# Patient Record
Sex: FEMALE | Race: WHITE | NOT HISPANIC OR LATINO | Employment: OTHER | URBAN - METROPOLITAN AREA
[De-identification: names, ages, dates, MRNs, and addresses within clinical notes are randomized per-mention and may not be internally consistent; named-entity substitution may affect disease eponyms.]

---

## 2017-03-10 ENCOUNTER — OFFICE VISIT - HEALTHEAST (OUTPATIENT)
Dept: CARDIOLOGY | Facility: CLINIC | Age: 82
End: 2017-03-10

## 2017-03-10 ENCOUNTER — AMBULATORY - HEALTHEAST (OUTPATIENT)
Dept: CARDIOLOGY | Facility: CLINIC | Age: 82
End: 2017-03-10

## 2017-03-10 DIAGNOSIS — N18.9 CHRONIC KIDNEY DISEASE, UNSPECIFIED: ICD-10-CM

## 2017-03-10 DIAGNOSIS — I50.32 CHRONIC DIASTOLIC HEART FAILURE (H): ICD-10-CM

## 2017-03-10 DIAGNOSIS — I48.19 PERSISTENT ATRIAL FIBRILLATION (H): ICD-10-CM

## 2017-03-10 ASSESSMENT — MIFFLIN-ST. JEOR: SCORE: 1294.98

## 2017-03-14 ENCOUNTER — HOSPITAL ENCOUNTER (OUTPATIENT)
Dept: CARDIOLOGY | Facility: HOSPITAL | Age: 82
Discharge: HOME | End: 2017-03-14
Attending: INTERNAL MEDICINE

## 2017-03-14 DIAGNOSIS — N18.9 CHRONIC KIDNEY DISEASE, UNSPECIFIED: ICD-10-CM

## 2017-03-14 DIAGNOSIS — I48.19 PERSISTENT ATRIAL FIBRILLATION (H): ICD-10-CM

## 2017-03-14 DIAGNOSIS — I50.32 CHRONIC DIASTOLIC HEART FAILURE (H): ICD-10-CM

## 2017-04-25 ENCOUNTER — RECORDS - HEALTHEAST (OUTPATIENT)
Dept: ADMINISTRATIVE | Facility: OTHER | Age: 82
End: 2017-04-25

## 2017-05-15 ENCOUNTER — RECORDS - HEALTHEAST (OUTPATIENT)
Dept: ADMINISTRATIVE | Facility: OTHER | Age: 82
End: 2017-05-15

## 2017-05-16 ENCOUNTER — RECORDS - HEALTHEAST (OUTPATIENT)
Dept: ADMINISTRATIVE | Facility: OTHER | Age: 82
End: 2017-05-16

## 2017-06-09 ENCOUNTER — RECORDS - HEALTHEAST (OUTPATIENT)
Dept: ADMINISTRATIVE | Facility: OTHER | Age: 82
End: 2017-06-09

## 2017-06-12 ENCOUNTER — RECORDS - HEALTHEAST (OUTPATIENT)
Dept: LAB | Facility: CLINIC | Age: 82
End: 2017-06-12

## 2017-06-12 LAB
CHOLEST SERPL-MCNC: 140 MG/DL
FASTING STATUS PATIENT QL REPORTED: ABNORMAL
HDLC SERPL-MCNC: 48 MG/DL
LDLC SERPL CALC-MCNC: 47 MG/DL
TRIGL SERPL-MCNC: 224 MG/DL

## 2017-07-31 ENCOUNTER — RECORDS - HEALTHEAST (OUTPATIENT)
Dept: ADMINISTRATIVE | Facility: OTHER | Age: 82
End: 2017-07-31

## 2017-10-03 ENCOUNTER — RECORDS - HEALTHEAST (OUTPATIENT)
Dept: ADMINISTRATIVE | Facility: OTHER | Age: 82
End: 2017-10-03

## 2017-10-03 ENCOUNTER — AMBULATORY - HEALTHEAST (OUTPATIENT)
Dept: CARDIOLOGY | Facility: CLINIC | Age: 82
End: 2017-10-03

## 2017-10-05 ENCOUNTER — OFFICE VISIT - HEALTHEAST (OUTPATIENT)
Dept: CARDIOLOGY | Facility: CLINIC | Age: 82
End: 2017-10-05

## 2017-10-05 ENCOUNTER — HOSPITAL ENCOUNTER (OUTPATIENT)
Dept: RADIOLOGY | Facility: HOSPITAL | Age: 82
Discharge: HOME | End: 2017-10-05
Attending: INTERNAL MEDICINE

## 2017-10-05 DIAGNOSIS — I50.32 CHRONIC DIASTOLIC HEART FAILURE (H): ICD-10-CM

## 2017-10-05 DIAGNOSIS — I48.19 PERSISTENT ATRIAL FIBRILLATION (H): ICD-10-CM

## 2017-10-05 DIAGNOSIS — I10 BENIGN ESSENTIAL HYPERTENSION: ICD-10-CM

## 2017-10-05 ASSESSMENT — MIFFLIN-ST. JEOR: SCORE: 1394.77

## 2017-10-09 ENCOUNTER — COMMUNICATION - HEALTHEAST (OUTPATIENT)
Dept: CARDIOLOGY | Facility: CLINIC | Age: 82
End: 2017-10-09

## 2017-10-09 ENCOUNTER — AMBULATORY - HEALTHEAST (OUTPATIENT)
Dept: CARDIOLOGY | Facility: CLINIC | Age: 82
End: 2017-10-09

## 2017-10-09 DIAGNOSIS — I48.91 ATRIAL FIBRILLATION (H): ICD-10-CM

## 2017-10-10 ENCOUNTER — OFFICE VISIT - HEALTHEAST (OUTPATIENT)
Dept: RHEUMATOLOGY | Facility: CLINIC | Age: 82
End: 2017-10-10

## 2017-10-10 ENCOUNTER — COMMUNICATION - HEALTHEAST (OUTPATIENT)
Dept: CARDIOLOGY | Facility: CLINIC | Age: 82
End: 2017-10-10

## 2017-10-10 ENCOUNTER — RECORDS - HEALTHEAST (OUTPATIENT)
Dept: GENERAL RADIOLOGY | Age: 82
End: 2017-10-10

## 2017-10-10 DIAGNOSIS — K50.90 CROHN DISEASE (H): ICD-10-CM

## 2017-10-10 DIAGNOSIS — I48.91 ATRIAL FIBRILLATION (H): ICD-10-CM

## 2017-10-10 DIAGNOSIS — M25.50 POLYARTHRALGIA: ICD-10-CM

## 2017-10-10 DIAGNOSIS — M25.50 PAIN IN UNSPECIFIED JOINT: ICD-10-CM

## 2017-10-10 ASSESSMENT — MIFFLIN-ST. JEOR: SCORE: 1392.05

## 2017-10-11 LAB — HCV AB SERPL QL IA: NEGATIVE

## 2017-10-12 LAB
ANA SER QL: 1.2 U
C-ANCA - HISTORICAL: NEGATIVE
P-ANCA - HISTORICAL: POSITIVE

## 2017-11-13 ENCOUNTER — COMMUNICATION - HEALTHEAST (OUTPATIENT)
Dept: ADMINISTRATIVE | Facility: CLINIC | Age: 82
End: 2017-11-13

## 2017-12-08 ENCOUNTER — OFFICE VISIT - HEALTHEAST (OUTPATIENT)
Dept: RHEUMATOLOGY | Facility: CLINIC | Age: 82
End: 2017-12-08

## 2017-12-08 DIAGNOSIS — M15.0 PRIMARY OSTEOARTHRITIS INVOLVING MULTIPLE JOINTS: ICD-10-CM

## 2017-12-08 DIAGNOSIS — M11.20 CHONDROCALCINOSIS: ICD-10-CM

## 2017-12-08 DIAGNOSIS — K50.90 CROHN DISEASE (H): ICD-10-CM

## 2017-12-08 DIAGNOSIS — M06.4 INFLAMMATORY POLYARTHRITIS (H): ICD-10-CM

## 2017-12-08 DIAGNOSIS — M25.50 POLYARTHRALGIA: ICD-10-CM

## 2017-12-08 ASSESSMENT — MIFFLIN-ST. JEOR: SCORE: 1376.63

## 2018-03-13 ENCOUNTER — RECORDS - HEALTHEAST (OUTPATIENT)
Dept: ADMINISTRATIVE | Facility: OTHER | Age: 83
End: 2018-03-13

## 2018-03-16 ENCOUNTER — RECORDS - HEALTHEAST (OUTPATIENT)
Dept: ADMINISTRATIVE | Facility: OTHER | Age: 83
End: 2018-03-16

## 2018-03-19 ENCOUNTER — AMBULATORY - HEALTHEAST (OUTPATIENT)
Dept: VASCULAR SURGERY | Facility: CLINIC | Age: 83
End: 2018-03-19

## 2018-03-19 DIAGNOSIS — S41.112A LACERATION OF LEFT UPPER EXTREMITY, INITIAL ENCOUNTER: ICD-10-CM

## 2018-03-20 ENCOUNTER — RECORDS - HEALTHEAST (OUTPATIENT)
Dept: ADMINISTRATIVE | Facility: OTHER | Age: 83
End: 2018-03-20

## 2018-03-21 ENCOUNTER — AMBULATORY - HEALTHEAST (OUTPATIENT)
Dept: VASCULAR SURGERY | Facility: CLINIC | Age: 83
End: 2018-03-21

## 2018-03-26 ENCOUNTER — OFFICE VISIT - HEALTHEAST (OUTPATIENT)
Dept: VASCULAR SURGERY | Facility: CLINIC | Age: 83
End: 2018-03-26

## 2018-03-26 DIAGNOSIS — E11.9 DM (DIABETES MELLITUS) (H): ICD-10-CM

## 2018-03-26 DIAGNOSIS — S51.012D SKIN TEAR OF LEFT ELBOW WITHOUT COMPLICATION, SUBSEQUENT ENCOUNTER: ICD-10-CM

## 2018-03-26 DIAGNOSIS — E11.9 DIABETES MELLITUS (H): ICD-10-CM

## 2018-03-26 DIAGNOSIS — I50.32 CHRONIC DIASTOLIC HEART FAILURE (H): ICD-10-CM

## 2018-03-30 ENCOUNTER — RECORDS - HEALTHEAST (OUTPATIENT)
Dept: ADMINISTRATIVE | Facility: OTHER | Age: 83
End: 2018-03-30

## 2018-04-09 ENCOUNTER — RECORDS - HEALTHEAST (OUTPATIENT)
Dept: ADMINISTRATIVE | Facility: OTHER | Age: 83
End: 2018-04-09

## 2018-04-09 ENCOUNTER — AMBULATORY - HEALTHEAST (OUTPATIENT)
Dept: CARDIOLOGY | Facility: CLINIC | Age: 83
End: 2018-04-09

## 2018-04-10 ENCOUNTER — COMMUNICATION - HEALTHEAST (OUTPATIENT)
Dept: ADMINISTRATIVE | Facility: CLINIC | Age: 83
End: 2018-04-10

## 2018-04-11 ENCOUNTER — COMMUNICATION - HEALTHEAST (OUTPATIENT)
Dept: CARDIOLOGY | Facility: CLINIC | Age: 83
End: 2018-04-11

## 2018-04-11 ENCOUNTER — OFFICE VISIT - HEALTHEAST (OUTPATIENT)
Dept: CARDIOLOGY | Facility: CLINIC | Age: 83
End: 2018-04-11

## 2018-04-11 DIAGNOSIS — I48.19 PERSISTENT ATRIAL FIBRILLATION (H): ICD-10-CM

## 2018-04-11 DIAGNOSIS — I25.10 CORONARY ARTERY DISEASE INVOLVING NATIVE CORONARY ARTERY WITHOUT ANGINA PECTORIS: ICD-10-CM

## 2018-04-11 DIAGNOSIS — I50.32 CHRONIC DIASTOLIC HEART FAILURE (H): ICD-10-CM

## 2018-04-11 ASSESSMENT — MIFFLIN-ST. JEOR: SCORE: 1399.31

## 2018-05-29 ENCOUNTER — SURGERY - HEALTHEAST (OUTPATIENT)
Dept: GASTROENTEROLOGY | Facility: HOSPITAL | Age: 83
End: 2018-05-29

## 2018-05-29 ASSESSMENT — MIFFLIN-ST. JEOR: SCORE: 1358.48

## 2018-06-04 ENCOUNTER — RECORDS - HEALTHEAST (OUTPATIENT)
Dept: LAB | Facility: CLINIC | Age: 83
End: 2018-06-04

## 2018-06-05 LAB — BACTERIA SPEC CULT: NORMAL

## 2018-08-03 ENCOUNTER — RECORDS - HEALTHEAST (OUTPATIENT)
Dept: LAB | Facility: CLINIC | Age: 83
End: 2018-08-03

## 2018-08-03 LAB
ALBUMIN SERPL-MCNC: 2.6 G/DL (ref 3.5–5)
ALP SERPL-CCNC: 71 U/L (ref 45–120)
ALT SERPL W P-5'-P-CCNC: 16 U/L (ref 0–45)
ANION GAP SERPL CALCULATED.3IONS-SCNC: 11 MMOL/L (ref 5–18)
AST SERPL W P-5'-P-CCNC: 17 U/L (ref 0–40)
BILIRUB SERPL-MCNC: 0.2 MG/DL (ref 0–1)
BUN SERPL-MCNC: 13 MG/DL (ref 8–28)
CALCIUM SERPL-MCNC: 8.8 MG/DL (ref 8.5–10.5)
CHLORIDE BLD-SCNC: 106 MMOL/L (ref 98–107)
CO2 SERPL-SCNC: 24 MMOL/L (ref 22–31)
CREAT SERPL-MCNC: 0.92 MG/DL (ref 0.6–1.1)
GFR SERPL CREATININE-BSD FRML MDRD: 58 ML/MIN/1.73M2
GLUCOSE BLD-MCNC: 130 MG/DL (ref 70–125)
POTASSIUM BLD-SCNC: 4.7 MMOL/L (ref 3.5–5)
PROT SERPL-MCNC: 5.9 G/DL (ref 6–8)
SODIUM SERPL-SCNC: 141 MMOL/L (ref 136–145)
TSH SERPL DL<=0.005 MIU/L-ACNC: 2.56 UIU/ML (ref 0.3–5)

## 2018-08-27 ENCOUNTER — HOME CARE/HOSPICE - HEALTHEAST (OUTPATIENT)
Dept: HOME HEALTH SERVICES | Facility: HOME HEALTH | Age: 83
End: 2018-08-27

## 2018-08-30 ENCOUNTER — HOME CARE/HOSPICE - HEALTHEAST (OUTPATIENT)
Dept: HOME HEALTH SERVICES | Facility: HOME HEALTH | Age: 83
End: 2018-08-30

## 2018-09-01 ENCOUNTER — HOME CARE/HOSPICE - HEALTHEAST (OUTPATIENT)
Dept: HOME HEALTH SERVICES | Facility: HOME HEALTH | Age: 83
End: 2018-09-01

## 2018-09-02 ENCOUNTER — HOME CARE/HOSPICE - HEALTHEAST (OUTPATIENT)
Dept: HOME HEALTH SERVICES | Facility: HOME HEALTH | Age: 83
End: 2018-09-02

## 2018-09-03 ENCOUNTER — HOME CARE/HOSPICE - HEALTHEAST (OUTPATIENT)
Dept: HOME HEALTH SERVICES | Facility: HOME HEALTH | Age: 83
End: 2018-09-03

## 2018-09-05 ENCOUNTER — HOME CARE/HOSPICE - HEALTHEAST (OUTPATIENT)
Dept: HOME HEALTH SERVICES | Facility: HOME HEALTH | Age: 83
End: 2018-09-05

## 2018-09-06 ENCOUNTER — HOME CARE/HOSPICE - HEALTHEAST (OUTPATIENT)
Dept: HOME HEALTH SERVICES | Facility: HOME HEALTH | Age: 83
End: 2018-09-06

## 2018-09-10 ENCOUNTER — HOME CARE/HOSPICE - HEALTHEAST (OUTPATIENT)
Dept: HOME HEALTH SERVICES | Facility: HOME HEALTH | Age: 83
End: 2018-09-10

## 2018-09-13 ENCOUNTER — RECORDS - HEALTHEAST (OUTPATIENT)
Dept: LAB | Facility: CLINIC | Age: 83
End: 2018-09-13

## 2018-09-13 ENCOUNTER — HOME CARE/HOSPICE - HEALTHEAST (OUTPATIENT)
Dept: HOME HEALTH SERVICES | Facility: HOME HEALTH | Age: 83
End: 2018-09-13

## 2018-09-13 ENCOUNTER — COMMUNICATION - HEALTHEAST (OUTPATIENT)
Dept: PHARMACY | Facility: CLINIC | Age: 83
End: 2018-09-13

## 2018-09-13 ENCOUNTER — HOSPITAL ENCOUNTER (OUTPATIENT)
Dept: PHARMACY | Facility: OTHER | Age: 83
Discharge: HOME | End: 2018-09-13
Attending: INTERNAL MEDICINE

## 2018-09-13 DIAGNOSIS — E11.9 DIABETES MELLITUS, TYPE II, INSULIN DEPENDENT (H): ICD-10-CM

## 2018-09-13 DIAGNOSIS — K51.911: ICD-10-CM

## 2018-09-13 DIAGNOSIS — I48.20 CHRONIC ATRIAL FIBRILLATION (H): ICD-10-CM

## 2018-09-13 DIAGNOSIS — Z79.4 DIABETES MELLITUS, TYPE II, INSULIN DEPENDENT (H): ICD-10-CM

## 2018-09-13 DIAGNOSIS — N39.0 URINARY TRACT INFECTION: ICD-10-CM

## 2018-09-13 LAB
ALBUMIN SERPL-MCNC: 2.8 G/DL (ref 3.5–5)
ALP SERPL-CCNC: 68 U/L (ref 45–120)
ALT SERPL W P-5'-P-CCNC: 20 U/L (ref 0–45)
ANION GAP SERPL CALCULATED.3IONS-SCNC: 13 MMOL/L (ref 5–18)
AST SERPL W P-5'-P-CCNC: 10 U/L (ref 0–40)
BILIRUB SERPL-MCNC: 0.3 MG/DL (ref 0–1)
BUN SERPL-MCNC: 28 MG/DL (ref 8–28)
CALCIUM SERPL-MCNC: 9.1 MG/DL (ref 8.5–10.5)
CHLORIDE BLD-SCNC: 101 MMOL/L (ref 98–107)
CO2 SERPL-SCNC: 24 MMOL/L (ref 22–31)
CREAT SERPL-MCNC: 1.27 MG/DL (ref 0.6–1.1)
GFR SERPL CREATININE-BSD FRML MDRD: 40 ML/MIN/1.73M2
GLUCOSE BLD-MCNC: 320 MG/DL (ref 70–125)
POTASSIUM BLD-SCNC: 4.7 MMOL/L (ref 3.5–5)
PROT SERPL-MCNC: 5.8 G/DL (ref 6–8)
SODIUM SERPL-SCNC: 138 MMOL/L (ref 136–145)

## 2018-09-14 ENCOUNTER — HOME CARE/HOSPICE - HEALTHEAST (OUTPATIENT)
Dept: HOME HEALTH SERVICES | Facility: HOME HEALTH | Age: 83
End: 2018-09-14

## 2018-09-17 ENCOUNTER — HOME CARE/HOSPICE - HEALTHEAST (OUTPATIENT)
Dept: HOME HEALTH SERVICES | Facility: HOME HEALTH | Age: 83
End: 2018-09-17

## 2018-09-18 ENCOUNTER — HOME CARE/HOSPICE - HEALTHEAST (OUTPATIENT)
Dept: HOME HEALTH SERVICES | Facility: HOME HEALTH | Age: 83
End: 2018-09-18

## 2018-09-20 ENCOUNTER — HOME CARE/HOSPICE - HEALTHEAST (OUTPATIENT)
Dept: HOME HEALTH SERVICES | Facility: HOME HEALTH | Age: 83
End: 2018-09-20

## 2018-09-21 ENCOUNTER — HOME CARE/HOSPICE - HEALTHEAST (OUTPATIENT)
Dept: HOME HEALTH SERVICES | Facility: HOME HEALTH | Age: 83
End: 2018-09-21

## 2018-09-22 ENCOUNTER — HOME CARE/HOSPICE - HEALTHEAST (OUTPATIENT)
Dept: HOME HEALTH SERVICES | Facility: HOME HEALTH | Age: 83
End: 2018-09-22

## 2018-09-24 ENCOUNTER — HOME CARE/HOSPICE - HEALTHEAST (OUTPATIENT)
Dept: HOME HEALTH SERVICES | Facility: HOME HEALTH | Age: 83
End: 2018-09-24

## 2018-09-25 ENCOUNTER — HOME CARE/HOSPICE - HEALTHEAST (OUTPATIENT)
Dept: HOME HEALTH SERVICES | Facility: HOME HEALTH | Age: 83
End: 2018-09-25

## 2018-09-26 ENCOUNTER — HOME CARE/HOSPICE - HEALTHEAST (OUTPATIENT)
Dept: HOME HEALTH SERVICES | Facility: HOME HEALTH | Age: 83
End: 2018-09-26

## 2018-09-27 ENCOUNTER — HOME CARE/HOSPICE - HEALTHEAST (OUTPATIENT)
Dept: HOME HEALTH SERVICES | Facility: HOME HEALTH | Age: 83
End: 2018-09-27

## 2018-09-28 ENCOUNTER — HOME CARE/HOSPICE - HEALTHEAST (OUTPATIENT)
Dept: HOME HEALTH SERVICES | Facility: HOME HEALTH | Age: 83
End: 2018-09-28

## 2018-10-01 ENCOUNTER — HOME CARE/HOSPICE - HEALTHEAST (OUTPATIENT)
Dept: HOME HEALTH SERVICES | Facility: HOME HEALTH | Age: 83
End: 2018-10-01

## 2018-10-02 ENCOUNTER — HOME CARE/HOSPICE - HEALTHEAST (OUTPATIENT)
Dept: HOME HEALTH SERVICES | Facility: HOME HEALTH | Age: 83
End: 2018-10-02

## 2018-10-03 ENCOUNTER — HOME CARE/HOSPICE - HEALTHEAST (OUTPATIENT)
Dept: HOME HEALTH SERVICES | Facility: HOME HEALTH | Age: 83
End: 2018-10-03

## 2018-10-03 ENCOUNTER — RECORDS - HEALTHEAST (OUTPATIENT)
Dept: LAB | Facility: CLINIC | Age: 83
End: 2018-10-03

## 2018-10-03 LAB
BASOPHILS # BLD AUTO: 0 THOU/UL (ref 0–0.2)
BASOPHILS NFR BLD AUTO: 0 % (ref 0–2)
EOSINOPHIL # BLD AUTO: 0 THOU/UL (ref 0–0.4)
EOSINOPHIL NFR BLD AUTO: 0 % (ref 0–6)
ERYTHROCYTE [DISTWIDTH] IN BLOOD BY AUTOMATED COUNT: 15.9 % (ref 11–14.5)
HCT VFR BLD AUTO: 35.3 % (ref 35–47)
HGB BLD-MCNC: 10.6 G/DL (ref 12–16)
LYMPHOCYTES # BLD AUTO: 0.5 THOU/UL (ref 0.8–4.4)
LYMPHOCYTES NFR BLD AUTO: 3 % (ref 20–40)
MCH RBC QN AUTO: 32.4 PG (ref 27–34)
MCHC RBC AUTO-ENTMCNC: 30 G/DL (ref 32–36)
MCV RBC AUTO: 108 FL (ref 80–100)
MONOCYTES # BLD AUTO: 0.2 THOU/UL (ref 0–0.9)
MONOCYTES NFR BLD AUTO: 1 % (ref 2–10)
NEUTROPHILS # BLD AUTO: 13.2 THOU/UL (ref 2–7.7)
NEUTROPHILS NFR BLD AUTO: 95 % (ref 50–70)
PLATELET # BLD AUTO: 210 THOU/UL (ref 140–440)
PMV BLD AUTO: 10.8 FL (ref 8.5–12.5)
RBC # BLD AUTO: 3.27 MILL/UL (ref 3.8–5.4)
WBC: 14.1 THOU/UL (ref 4–11)

## 2018-10-04 ENCOUNTER — RECORDS - HEALTHEAST (OUTPATIENT)
Dept: ADMINISTRATIVE | Facility: OTHER | Age: 83
End: 2018-10-04

## 2018-10-04 ENCOUNTER — HOME CARE/HOSPICE - HEALTHEAST (OUTPATIENT)
Dept: HOME HEALTH SERVICES | Facility: HOME HEALTH | Age: 83
End: 2018-10-04

## 2018-10-05 ENCOUNTER — HOSPITAL ENCOUNTER (OUTPATIENT)
Dept: RADIOLOGY | Facility: HOSPITAL | Age: 83
Discharge: HOME | End: 2018-10-05
Attending: INTERNAL MEDICINE

## 2018-10-05 ENCOUNTER — HOME CARE/HOSPICE - HEALTHEAST (OUTPATIENT)
Dept: HOME HEALTH SERVICES | Facility: HOME HEALTH | Age: 83
End: 2018-10-05

## 2018-10-05 ENCOUNTER — RECORDS - HEALTHEAST (OUTPATIENT)
Dept: ADMINISTRATIVE | Facility: OTHER | Age: 83
End: 2018-10-05

## 2018-10-05 DIAGNOSIS — K51.00 ULCERATIVE PANCOLITIS (H): ICD-10-CM

## 2018-10-05 DIAGNOSIS — K59.00 CONSTIPATION: ICD-10-CM

## 2018-10-05 DIAGNOSIS — K64.9 HEMORRHOIDS, UNSPECIFIED HEMORRHOID TYPE: ICD-10-CM

## 2018-10-05 DIAGNOSIS — K57.30 DIVERTICULOSIS LARGE INTESTINE W/O PERFORATION OR ABSCESS W/O BLEEDING: ICD-10-CM

## 2018-10-05 ASSESSMENT — MIFFLIN-ST. JEOR: SCORE: 1326.73

## 2018-10-06 ENCOUNTER — HOME CARE/HOSPICE - HEALTHEAST (OUTPATIENT)
Dept: HOME HEALTH SERVICES | Facility: HOME HEALTH | Age: 83
End: 2018-10-06

## 2018-10-06 ENCOUNTER — ANESTHESIA - HEALTHEAST (OUTPATIENT)
Dept: SURGERY | Facility: HOSPITAL | Age: 83
End: 2018-10-06

## 2018-10-06 ENCOUNTER — SURGERY - HEALTHEAST (OUTPATIENT)
Dept: SURGERY | Facility: HOSPITAL | Age: 83
End: 2018-10-06

## 2018-10-06 ASSESSMENT — MIFFLIN-ST. JEOR: SCORE: 1316.75

## 2018-10-07 ASSESSMENT — MIFFLIN-ST. JEOR: SCORE: 1323.56

## 2018-10-08 ENCOUNTER — HOME CARE/HOSPICE - HEALTHEAST (OUTPATIENT)
Dept: HOME HEALTH SERVICES | Facility: HOME HEALTH | Age: 83
End: 2018-10-08

## 2018-10-09 ASSESSMENT — MIFFLIN-ST. JEOR: SCORE: 1270.49

## 2018-10-10 ENCOUNTER — AMBULATORY - HEALTHEAST (OUTPATIENT)
Dept: SCHEDULING | Facility: CLINIC | Age: 83
End: 2018-10-10

## 2018-10-10 DIAGNOSIS — K59.00 CONSTIPATION, UNSPECIFIED CONSTIPATION TYPE: ICD-10-CM

## 2018-10-11 ASSESSMENT — MIFFLIN-ST. JEOR: SCORE: 1250.53

## 2018-10-12 ENCOUNTER — RECORDS - HEALTHEAST (OUTPATIENT)
Dept: ADMINISTRATIVE | Facility: OTHER | Age: 83
End: 2018-10-12

## 2018-10-14 ENCOUNTER — RECORDS - HEALTHEAST (OUTPATIENT)
Dept: ADMINISTRATIVE | Facility: OTHER | Age: 83
End: 2018-10-14

## 2018-10-15 ENCOUNTER — RECORDS - HEALTHEAST (OUTPATIENT)
Dept: LAB | Facility: CLINIC | Age: 83
End: 2018-10-15

## 2018-10-15 ENCOUNTER — OFFICE VISIT - HEALTHEAST (OUTPATIENT)
Dept: GERIATRICS | Facility: CLINIC | Age: 83
End: 2018-10-15

## 2018-10-15 DIAGNOSIS — Z90.49 S/P COLECTOMY: ICD-10-CM

## 2018-10-15 DIAGNOSIS — R52 PAIN MANAGEMENT: ICD-10-CM

## 2018-10-15 DIAGNOSIS — E87.6 HYPOKALEMIA: ICD-10-CM

## 2018-10-15 DIAGNOSIS — K56.609 COLONIC OBSTRUCTION (H): ICD-10-CM

## 2018-10-15 DIAGNOSIS — Z43.2 ILEOSTOMY BAG CHANGED (H): ICD-10-CM

## 2018-10-15 DIAGNOSIS — K51.90 ULCERATIVE COLITIS (H): ICD-10-CM

## 2018-10-15 DIAGNOSIS — R79.0 LOW MAGNESIUM LEVELS: ICD-10-CM

## 2018-10-15 DIAGNOSIS — I95.9 HYPOTENSION: ICD-10-CM

## 2018-10-15 LAB
ALBUMIN SERPL-MCNC: 3.2 G/DL (ref 3.5–5)
ANION GAP SERPL CALCULATED.3IONS-SCNC: 10 MMOL/L (ref 5–18)
ANION GAP SERPL CALCULATED.3IONS-SCNC: 10 MMOL/L (ref 5–18)
ANION GAP SERPL CALCULATED.3IONS-SCNC: 11 MMOL/L (ref 5–18)
BASOPHILS # BLD AUTO: 0 THOU/UL (ref 0–0.2)
BASOPHILS NFR BLD AUTO: 0 % (ref 0–2)
BUN SERPL-MCNC: 34 MG/DL (ref 8–28)
BUN SERPL-MCNC: 34 MG/DL (ref 8–28)
CALCIUM SERPL-MCNC: 10.5 MG/DL (ref 8.5–10.5)
CALCIUM SERPL-MCNC: 10.6 MG/DL (ref 8.5–10.5)
CHLORIDE BLD-SCNC: 99 MMOL/L (ref 98–107)
CO2 SERPL-SCNC: 28 MMOL/L (ref 22–31)
CREAT SERPL-MCNC: 1.57 MG/DL (ref 0.6–1.1)
CREAT SERPL-MCNC: 1.59 MG/DL (ref 0.6–1.1)
EOSINOPHIL COUNT (ABSOLUTE): 0.6 THOU/UL (ref 0–0.4)
EOSINOPHIL NFR BLD AUTO: 3 % (ref 0–6)
ERYTHROCYTE [DISTWIDTH] IN BLOOD BY AUTOMATED COUNT: 14.9 % (ref 11–14.5)
GFR SERPL CREATININE-BSD FRML MDRD: 31 ML/MIN/1.73M2
GFR SERPL CREATININE-BSD FRML MDRD: 31 ML/MIN/1.73M2
GLUCOSE BLD-MCNC: 201 MG/DL (ref 70–125)
GLUCOSE BLD-MCNC: 204 MG/DL (ref 70–125)
HCT VFR BLD AUTO: 37 % (ref 35–47)
HGB BLD-MCNC: 11.3 G/DL (ref 12–16)
LYMPHOCYTES # BLD AUTO: 1 THOU/UL (ref 0.8–4.4)
LYMPHOCYTES NFR BLD AUTO: 5 % (ref 20–40)
MAGNESIUM SERPL-MCNC: 2.2 MG/DL (ref 1.8–2.6)
MANUAL NRBC PER 100 CELLS: 1
MCH RBC QN AUTO: 31.4 PG (ref 27–34)
MCHC RBC AUTO-ENTMCNC: 30.5 G/DL (ref 32–36)
MCV RBC AUTO: 103 FL (ref 80–100)
METAMYELOCYTES (ABSOLUTE): 0.6 THOU/UL
METAMYELOCYTES NFR BLD MANUAL: 3 %
MONOCYTES # BLD AUTO: 1.3 THOU/UL (ref 0–0.9)
MONOCYTES NFR BLD AUTO: 7 % (ref 2–10)
MYELOCYTES (ABSOLUTE): 0.1 THOU/UL
MYELOCYTES NFR BLD MANUAL: 1 %
OVALOCYTES: ABNORMAL
PHOSPHATE SERPL-MCNC: 4.1 MG/DL (ref 2.5–4.5)
PLAT MORPH BLD: NORMAL
PLATELET # BLD AUTO: 308 THOU/UL (ref 140–440)
PMV BLD AUTO: 10 FL (ref 8.5–12.5)
POLYCHROMASIA BLD QL SMEAR: ABNORMAL
POTASSIUM BLD-SCNC: 5.5 MMOL/L (ref 3.5–5)
RBC # BLD AUTO: 3.6 MILL/UL (ref 3.8–5.4)
SODIUM SERPL-SCNC: 137 MMOL/L (ref 136–145)
SODIUM SERPL-SCNC: 137 MMOL/L (ref 136–145)
SODIUM SERPL-SCNC: 138 MMOL/L (ref 136–145)
TEAR DROP: ABNORMAL
TOTAL NEUTROPHILS-ABS(DIFF): 15.6 THOU/UL (ref 2–7.7)
TOTAL NEUTROPHILS-REL(DIFF): 82 % (ref 50–70)
WBC: 19.1 THOU/UL (ref 4–11)

## 2018-10-16 ENCOUNTER — RECORDS - HEALTHEAST (OUTPATIENT)
Dept: ADMINISTRATIVE | Facility: OTHER | Age: 83
End: 2018-10-16

## 2018-10-16 ENCOUNTER — RECORDS - HEALTHEAST (OUTPATIENT)
Dept: LAB | Facility: CLINIC | Age: 83
End: 2018-10-16

## 2018-10-16 ENCOUNTER — AMBULATORY - HEALTHEAST (OUTPATIENT)
Dept: CARDIOLOGY | Facility: CLINIC | Age: 83
End: 2018-10-16

## 2018-10-16 LAB
ALBUMIN UR-MCNC: ABNORMAL MG/DL
ANION GAP SERPL CALCULATED.3IONS-SCNC: 10 MMOL/L (ref 5–18)
APPEARANCE UR: ABNORMAL
BACTERIA #/AREA URNS HPF: ABNORMAL HPF
BILIRUB UR QL STRIP: NEGATIVE
BUN SERPL-MCNC: 35 MG/DL (ref 8–28)
CALCIUM SERPL-MCNC: 9.4 MG/DL (ref 8.5–10.5)
CHLORIDE BLD-SCNC: 100 MMOL/L (ref 98–107)
CO2 SERPL-SCNC: 22 MMOL/L (ref 22–31)
COLOR UR AUTO: YELLOW
CREAT SERPL-MCNC: 1.63 MG/DL (ref 0.6–1.1)
GFR SERPL CREATININE-BSD FRML MDRD: 30 ML/MIN/1.73M2
GLUCOSE BLD-MCNC: 360 MG/DL (ref 70–125)
GLUCOSE UR STRIP-MCNC: ABNORMAL MG/DL
HGB UR QL STRIP: ABNORMAL
KETONES UR STRIP-MCNC: NEGATIVE MG/DL
LEUKOCYTE ESTERASE UR QL STRIP: ABNORMAL
MUCOUS THREADS #/AREA URNS LPF: ABNORMAL LPF
NITRATE UR QL: NEGATIVE
PH UR STRIP: 5.5 [PH] (ref 4.5–8)
POTASSIUM BLD-SCNC: 4.7 MMOL/L (ref 3.5–5)
RBC #/AREA URNS AUTO: ABNORMAL HPF
SODIUM SERPL-SCNC: 132 MMOL/L (ref 136–145)
SP GR UR STRIP: 1.02 (ref 1–1.03)
SQUAMOUS #/AREA URNS AUTO: ABNORMAL LPF
UROBILINOGEN UR STRIP-ACNC: ABNORMAL
WBC #/AREA URNS AUTO: >100 HPF
WBC CLUMPS #/AREA URNS HPF: PRESENT /[HPF]

## 2018-10-17 ENCOUNTER — OFFICE VISIT - HEALTHEAST (OUTPATIENT)
Dept: GERIATRICS | Facility: CLINIC | Age: 83
End: 2018-10-17

## 2018-10-17 ENCOUNTER — RECORDS - HEALTHEAST (OUTPATIENT)
Dept: LAB | Facility: CLINIC | Age: 83
End: 2018-10-17

## 2018-10-17 DIAGNOSIS — I95.9 HYPOTENSION: ICD-10-CM

## 2018-10-17 DIAGNOSIS — D72.829 LEUKOCYTOSIS: ICD-10-CM

## 2018-10-17 DIAGNOSIS — Z43.2 ILEOSTOMY BAG CHANGED (H): ICD-10-CM

## 2018-10-17 DIAGNOSIS — K51.90 ULCERATIVE COLITIS (H): ICD-10-CM

## 2018-10-17 DIAGNOSIS — Z90.49 S/P COLECTOMY: ICD-10-CM

## 2018-10-17 DIAGNOSIS — N17.9 AKI (ACUTE KIDNEY INJURY) (H): ICD-10-CM

## 2018-10-17 DIAGNOSIS — K56.609 COLONIC OBSTRUCTION (H): ICD-10-CM

## 2018-10-17 DIAGNOSIS — N39.0 URINARY TRACT INFECTION: ICD-10-CM

## 2018-10-17 LAB
ANION GAP SERPL CALCULATED.3IONS-SCNC: 13 MMOL/L (ref 5–18)
BUN SERPL-MCNC: 34 MG/DL (ref 8–28)
CALCIUM SERPL-MCNC: 9.5 MG/DL (ref 8.5–10.5)
CHLORIDE BLD-SCNC: 102 MMOL/L (ref 98–107)
CO2 SERPL-SCNC: 18 MMOL/L (ref 22–31)
CREAT SERPL-MCNC: 1.49 MG/DL (ref 0.6–1.1)
ERYTHROCYTE [DISTWIDTH] IN BLOOD BY AUTOMATED COUNT: 14.8 % (ref 11–14.5)
GFR SERPL CREATININE-BSD FRML MDRD: 33 ML/MIN/1.73M2
GLUCOSE BLD-MCNC: 366 MG/DL (ref 70–125)
HCT VFR BLD AUTO: 32.7 % (ref 35–47)
HGB BLD-MCNC: 10.3 G/DL (ref 12–16)
MCH RBC QN AUTO: 31.4 PG (ref 27–34)
MCHC RBC AUTO-ENTMCNC: 31.5 G/DL (ref 32–36)
MCV RBC AUTO: 100 FL (ref 80–100)
PLATELET # BLD AUTO: 284 THOU/UL (ref 140–440)
PMV BLD AUTO: 10.5 FL (ref 8.5–12.5)
POTASSIUM BLD-SCNC: 5.4 MMOL/L (ref 3.5–5)
PROCALCITONIN SERPL-MCNC: 0.07 NG/ML (ref 0–0.49)
RBC # BLD AUTO: 3.28 MILL/UL (ref 3.8–5.4)
SODIUM SERPL-SCNC: 133 MMOL/L (ref 136–145)
WBC: 21.1 THOU/UL (ref 4–11)

## 2018-10-18 ENCOUNTER — RECORDS - HEALTHEAST (OUTPATIENT)
Dept: LAB | Facility: CLINIC | Age: 83
End: 2018-10-18

## 2018-10-18 ENCOUNTER — OFFICE VISIT - HEALTHEAST (OUTPATIENT)
Dept: GERIATRICS | Facility: CLINIC | Age: 83
End: 2018-10-18

## 2018-10-18 ENCOUNTER — OFFICE VISIT - HEALTHEAST (OUTPATIENT)
Dept: CARDIOLOGY | Facility: CLINIC | Age: 83
End: 2018-10-18

## 2018-10-18 DIAGNOSIS — N39.0 URINARY TRACT INFECTION: ICD-10-CM

## 2018-10-18 DIAGNOSIS — Z43.2 ILEOSTOMY BAG CHANGED (H): ICD-10-CM

## 2018-10-18 DIAGNOSIS — I50.32 CHRONIC DIASTOLIC CHF (CONGESTIVE HEART FAILURE) (H): ICD-10-CM

## 2018-10-18 DIAGNOSIS — K51.90 ULCERATIVE COLITIS (H): ICD-10-CM

## 2018-10-18 DIAGNOSIS — D72.829 LEUKOCYTOSIS: ICD-10-CM

## 2018-10-18 DIAGNOSIS — K56.609 COLONIC OBSTRUCTION (H): ICD-10-CM

## 2018-10-18 DIAGNOSIS — Z90.49 S/P COLECTOMY: ICD-10-CM

## 2018-10-18 DIAGNOSIS — N17.9 AKI (ACUTE KIDNEY INJURY) (H): ICD-10-CM

## 2018-10-18 DIAGNOSIS — I48.19 PERSISTENT ATRIAL FIBRILLATION (H): ICD-10-CM

## 2018-10-18 LAB
ANION GAP SERPL CALCULATED.3IONS-SCNC: 6 MMOL/L (ref 5–18)
BACTERIA SPEC CULT: ABNORMAL
BASOPHILS # BLD AUTO: 0.1 THOU/UL (ref 0–0.2)
BASOPHILS NFR BLD AUTO: 1 % (ref 0–2)
BUN SERPL-MCNC: 16 MG/DL (ref 8–28)
CALCIUM SERPL-MCNC: 8.4 MG/DL (ref 8.5–10.5)
CHLORIDE BLD-SCNC: 108 MMOL/L (ref 98–107)
CO2 SERPL-SCNC: 27 MMOL/L (ref 22–31)
CREAT SERPL-MCNC: 1.04 MG/DL (ref 0.6–1.1)
EOSINOPHIL # BLD AUTO: 0.4 THOU/UL (ref 0–0.4)
EOSINOPHIL NFR BLD AUTO: 4 % (ref 0–6)
ERYTHROCYTE [DISTWIDTH] IN BLOOD BY AUTOMATED COUNT: 15.4 % (ref 11–14.5)
GFR SERPL CREATININE-BSD FRML MDRD: 50 ML/MIN/1.73M2
GLUCOSE BLD-MCNC: 75 MG/DL (ref 70–125)
HCT VFR BLD AUTO: 30.6 % (ref 35–47)
HGB BLD-MCNC: 9.5 G/DL (ref 12–16)
LYMPHOCYTES # BLD AUTO: 2.1 THOU/UL (ref 0.8–4.4)
LYMPHOCYTES NFR BLD AUTO: 24 % (ref 20–40)
MCH RBC QN AUTO: 29.9 PG (ref 27–34)
MCHC RBC AUTO-ENTMCNC: 31 G/DL (ref 32–36)
MCV RBC AUTO: 96 FL (ref 80–100)
MONOCYTES # BLD AUTO: 1.2 THOU/UL (ref 0–0.9)
MONOCYTES NFR BLD AUTO: 14 % (ref 2–10)
NEUTROPHILS # BLD AUTO: 4.9 THOU/UL (ref 2–7.7)
NEUTROPHILS NFR BLD AUTO: 56 % (ref 50–70)
PLATELET # BLD AUTO: 285 THOU/UL (ref 140–440)
PMV BLD AUTO: 11.1 FL (ref 8.5–12.5)
POTASSIUM BLD-SCNC: 3.6 MMOL/L (ref 3.5–5)
PROCALCITONIN SERPL-MCNC: 0.06 NG/ML (ref 0–0.49)
RBC # BLD AUTO: 3.18 MILL/UL (ref 3.8–5.4)
SODIUM SERPL-SCNC: 141 MMOL/L (ref 136–145)
WBC: 8.7 THOU/UL (ref 4–11)

## 2018-10-19 ENCOUNTER — RECORDS - HEALTHEAST (OUTPATIENT)
Dept: LAB | Facility: CLINIC | Age: 83
End: 2018-10-19

## 2018-10-22 LAB
ANION GAP SERPL CALCULATED.3IONS-SCNC: 11 MMOL/L (ref 5–18)
BUN SERPL-MCNC: 18 MG/DL (ref 8–28)
CALCIUM SERPL-MCNC: 9.4 MG/DL (ref 8.5–10.5)
CHLORIDE BLD-SCNC: 109 MMOL/L (ref 98–107)
CO2 SERPL-SCNC: 22 MMOL/L (ref 22–31)
CREAT SERPL-MCNC: 1.05 MG/DL (ref 0.6–1.1)
ERYTHROCYTE [DISTWIDTH] IN BLOOD BY AUTOMATED COUNT: 15.1 % (ref 11–14.5)
GFR SERPL CREATININE-BSD FRML MDRD: 50 ML/MIN/1.73M2
GLUCOSE BLD-MCNC: 42 MG/DL (ref 70–125)
HCT VFR BLD AUTO: 30.3 % (ref 35–47)
HGB BLD-MCNC: 9.4 G/DL (ref 12–16)
MCH RBC QN AUTO: 31.5 PG (ref 27–34)
MCHC RBC AUTO-ENTMCNC: 31 G/DL (ref 32–36)
MCV RBC AUTO: 102 FL (ref 80–100)
PLATELET # BLD AUTO: 187 THOU/UL (ref 140–440)
PMV BLD AUTO: 10.9 FL (ref 8.5–12.5)
POTASSIUM BLD-SCNC: 4.1 MMOL/L (ref 3.5–5)
RBC # BLD AUTO: 2.98 MILL/UL (ref 3.8–5.4)
SODIUM SERPL-SCNC: 142 MMOL/L (ref 136–145)
WBC: 13.7 THOU/UL (ref 4–11)

## 2018-10-23 ENCOUNTER — OFFICE VISIT - HEALTHEAST (OUTPATIENT)
Dept: GERIATRICS | Facility: CLINIC | Age: 83
End: 2018-10-23

## 2018-10-23 DIAGNOSIS — N39.0 URINARY TRACT INFECTION: ICD-10-CM

## 2018-10-23 DIAGNOSIS — N17.9 AKI (ACUTE KIDNEY INJURY) (H): ICD-10-CM

## 2018-10-23 DIAGNOSIS — K56.609 COLONIC OBSTRUCTION (H): ICD-10-CM

## 2018-10-23 DIAGNOSIS — D72.829 LEUKOCYTOSIS: ICD-10-CM

## 2018-10-23 DIAGNOSIS — Z43.2 ILEOSTOMY BAG CHANGED (H): ICD-10-CM

## 2018-10-23 DIAGNOSIS — Z90.49 S/P COLECTOMY: ICD-10-CM

## 2018-10-23 DIAGNOSIS — K51.90 ULCERATIVE COLITIS (H): ICD-10-CM

## 2018-10-24 ENCOUNTER — RECORDS - HEALTHEAST (OUTPATIENT)
Dept: LAB | Facility: CLINIC | Age: 83
End: 2018-10-24

## 2018-10-25 ENCOUNTER — OFFICE VISIT - HEALTHEAST (OUTPATIENT)
Dept: GERIATRICS | Facility: CLINIC | Age: 83
End: 2018-10-25

## 2018-10-25 DIAGNOSIS — N17.9 AKI (ACUTE KIDNEY INJURY) (H): ICD-10-CM

## 2018-10-25 DIAGNOSIS — Z90.49 S/P COLECTOMY: ICD-10-CM

## 2018-10-25 DIAGNOSIS — K56.609 COLONIC OBSTRUCTION (H): ICD-10-CM

## 2018-10-25 DIAGNOSIS — K51.90 ULCERATIVE COLITIS (H): ICD-10-CM

## 2018-10-25 DIAGNOSIS — Z43.2 ILEOSTOMY BAG CHANGED (H): ICD-10-CM

## 2018-10-25 DIAGNOSIS — N39.0 URINARY TRACT INFECTION: ICD-10-CM

## 2018-10-25 LAB
ANION GAP SERPL CALCULATED.3IONS-SCNC: 5 MMOL/L (ref 5–18)
BUN SERPL-MCNC: 19 MG/DL (ref 8–28)
CALCIUM SERPL-MCNC: 9.6 MG/DL (ref 8.5–10.5)
CHLORIDE BLD-SCNC: 108 MMOL/L (ref 98–107)
CO2 SERPL-SCNC: 27 MMOL/L (ref 22–31)
CREAT SERPL-MCNC: 1 MG/DL (ref 0.6–1.1)
ERYTHROCYTE [DISTWIDTH] IN BLOOD BY AUTOMATED COUNT: 15.4 % (ref 11–14.5)
GFR SERPL CREATININE-BSD FRML MDRD: 53 ML/MIN/1.73M2
GLUCOSE BLD-MCNC: 92 MG/DL (ref 70–125)
HCT VFR BLD AUTO: 30.7 % (ref 35–47)
HGB BLD-MCNC: 9.4 G/DL (ref 12–16)
MCH RBC QN AUTO: 31.4 PG (ref 27–34)
MCHC RBC AUTO-ENTMCNC: 30.6 G/DL (ref 32–36)
MCV RBC AUTO: 103 FL (ref 80–100)
PLATELET # BLD AUTO: 160 THOU/UL (ref 140–440)
PMV BLD AUTO: 10.6 FL (ref 8.5–12.5)
POTASSIUM BLD-SCNC: 5.2 MMOL/L (ref 3.5–5)
RBC # BLD AUTO: 2.99 MILL/UL (ref 3.8–5.4)
SODIUM SERPL-SCNC: 140 MMOL/L (ref 136–145)
WBC: 12.8 THOU/UL (ref 4–11)

## 2018-10-30 ENCOUNTER — RECORDS - HEALTHEAST (OUTPATIENT)
Dept: LAB | Facility: CLINIC | Age: 83
End: 2018-10-30

## 2018-10-30 ENCOUNTER — OFFICE VISIT - HEALTHEAST (OUTPATIENT)
Dept: GERIATRICS | Facility: CLINIC | Age: 83
End: 2018-10-30

## 2018-10-30 DIAGNOSIS — K51.90 ULCERATIVE COLITIS (H): ICD-10-CM

## 2018-10-30 DIAGNOSIS — K56.609 COLONIC OBSTRUCTION (H): ICD-10-CM

## 2018-10-30 DIAGNOSIS — Z43.2 ILEOSTOMY BAG CHANGED (H): ICD-10-CM

## 2018-10-30 DIAGNOSIS — D72.829 LEUKOCYTOSIS: ICD-10-CM

## 2018-10-30 DIAGNOSIS — N17.9 AKI (ACUTE KIDNEY INJURY) (H): ICD-10-CM

## 2018-10-30 DIAGNOSIS — Z90.49 S/P COLECTOMY: ICD-10-CM

## 2018-10-30 LAB
ANION GAP SERPL CALCULATED.3IONS-SCNC: 6 MMOL/L (ref 5–18)
BASOPHILS # BLD AUTO: 0.1 THOU/UL (ref 0–0.2)
BASOPHILS NFR BLD AUTO: 1 % (ref 0–2)
BUN SERPL-MCNC: 22 MG/DL (ref 8–28)
CALCIUM SERPL-MCNC: 9.7 MG/DL (ref 8.5–10.5)
CHLORIDE BLD-SCNC: 109 MMOL/L (ref 98–107)
CO2 SERPL-SCNC: 23 MMOL/L (ref 22–31)
CREAT SERPL-MCNC: 1.23 MG/DL (ref 0.6–1.1)
EOSINOPHIL # BLD AUTO: 0.3 THOU/UL (ref 0–0.4)
EOSINOPHIL NFR BLD AUTO: 2 % (ref 0–6)
ERYTHROCYTE [DISTWIDTH] IN BLOOD BY AUTOMATED COUNT: 15.6 % (ref 11–14.5)
GFR SERPL CREATININE-BSD FRML MDRD: 41 ML/MIN/1.73M2
GLUCOSE BLD-MCNC: 90 MG/DL (ref 70–125)
HCT VFR BLD AUTO: 29.5 % (ref 35–47)
HGB BLD-MCNC: 9.2 G/DL (ref 12–16)
LYMPHOCYTES # BLD AUTO: 1.4 THOU/UL (ref 0.8–4.4)
LYMPHOCYTES NFR BLD AUTO: 13 % (ref 20–40)
MCH RBC QN AUTO: 31.8 PG (ref 27–34)
MCHC RBC AUTO-ENTMCNC: 31.2 G/DL (ref 32–36)
MCV RBC AUTO: 102 FL (ref 80–100)
MONOCYTES # BLD AUTO: 0.8 THOU/UL (ref 0–0.9)
MONOCYTES NFR BLD AUTO: 8 % (ref 2–10)
NEUTROPHILS # BLD AUTO: 7.8 THOU/UL (ref 2–7.7)
NEUTROPHILS NFR BLD AUTO: 76 % (ref 50–70)
PLATELET # BLD AUTO: 151 THOU/UL (ref 140–440)
PMV BLD AUTO: 10.7 FL (ref 8.5–12.5)
POTASSIUM BLD-SCNC: 5 MMOL/L (ref 3.5–5)
RBC # BLD AUTO: 2.89 MILL/UL (ref 3.8–5.4)
SODIUM SERPL-SCNC: 138 MMOL/L (ref 136–145)
WBC: 10.5 THOU/UL (ref 4–11)

## 2018-10-31 ENCOUNTER — HOME CARE/HOSPICE - HEALTHEAST (OUTPATIENT)
Dept: HOME HEALTH SERVICES | Facility: HOME HEALTH | Age: 83
End: 2018-10-31

## 2018-11-01 ENCOUNTER — RECORDS - HEALTHEAST (OUTPATIENT)
Dept: LAB | Facility: CLINIC | Age: 83
End: 2018-11-01

## 2018-11-01 ENCOUNTER — OFFICE VISIT - HEALTHEAST (OUTPATIENT)
Dept: GERIATRICS | Facility: CLINIC | Age: 83
End: 2018-11-01

## 2018-11-01 DIAGNOSIS — B37.2 YEAST INFECTION OF THE SKIN: ICD-10-CM

## 2018-11-01 DIAGNOSIS — N17.9 AKI (ACUTE KIDNEY INJURY) (H): ICD-10-CM

## 2018-11-01 DIAGNOSIS — Z90.49 S/P COLECTOMY: ICD-10-CM

## 2018-11-01 DIAGNOSIS — N39.0 URINARY TRACT INFECTION: ICD-10-CM

## 2018-11-01 DIAGNOSIS — E11.9 DM (DIABETES MELLITUS) (H): ICD-10-CM

## 2018-11-01 DIAGNOSIS — K56.609 COLONIC OBSTRUCTION (H): ICD-10-CM

## 2018-11-01 DIAGNOSIS — D72.829 LEUKOCYTOSIS: ICD-10-CM

## 2018-11-01 DIAGNOSIS — K51.90 ULCERATIVE COLITIS (H): ICD-10-CM

## 2018-11-01 LAB
ANION GAP SERPL CALCULATED.3IONS-SCNC: 11 MMOL/L (ref 5–18)
BUN SERPL-MCNC: 18 MG/DL (ref 8–28)
CALCIUM SERPL-MCNC: 8.9 MG/DL (ref 8.5–10.5)
CHLORIDE BLD-SCNC: 108 MMOL/L (ref 98–107)
CO2 SERPL-SCNC: 17 MMOL/L (ref 22–31)
CREAT SERPL-MCNC: 1.15 MG/DL (ref 0.6–1.1)
ERYTHROCYTE [DISTWIDTH] IN BLOOD BY AUTOMATED COUNT: 15.7 % (ref 11–14.5)
GFR SERPL CREATININE-BSD FRML MDRD: 45 ML/MIN/1.73M2
GLUCOSE BLD-MCNC: 193 MG/DL (ref 70–125)
HCT VFR BLD AUTO: 29.5 % (ref 35–47)
HGB BLD-MCNC: 9.1 G/DL (ref 12–16)
MCH RBC QN AUTO: 31.6 PG (ref 27–34)
MCHC RBC AUTO-ENTMCNC: 30.8 G/DL (ref 32–36)
MCV RBC AUTO: 102 FL (ref 80–100)
PLATELET # BLD AUTO: 164 THOU/UL (ref 140–440)
PMV BLD AUTO: 10.6 FL (ref 8.5–12.5)
POTASSIUM BLD-SCNC: 4.9 MMOL/L (ref 3.5–5)
RBC # BLD AUTO: 2.88 MILL/UL (ref 3.8–5.4)
SODIUM SERPL-SCNC: 136 MMOL/L (ref 136–145)
WBC: 9.3 THOU/UL (ref 4–11)

## 2018-11-05 ENCOUNTER — AMBULATORY - HEALTHEAST (OUTPATIENT)
Dept: GERIATRICS | Facility: CLINIC | Age: 83
End: 2018-11-05

## 2018-11-05 ENCOUNTER — RECORDS - HEALTHEAST (OUTPATIENT)
Dept: LAB | Facility: CLINIC | Age: 83
End: 2018-11-05

## 2018-11-06 LAB
ALBUMIN SERPL-MCNC: 3.2 G/DL (ref 3.5–5)
ALP SERPL-CCNC: 89 U/L (ref 45–120)
ALT SERPL W P-5'-P-CCNC: <9 U/L (ref 0–45)
ANION GAP SERPL CALCULATED.3IONS-SCNC: 13 MMOL/L (ref 5–18)
AST SERPL W P-5'-P-CCNC: 11 U/L (ref 0–40)
BILIRUB SERPL-MCNC: 0.3 MG/DL (ref 0–1)
BUN SERPL-MCNC: 20 MG/DL (ref 8–28)
CALCIUM SERPL-MCNC: 9.8 MG/DL (ref 8.5–10.5)
CHLORIDE BLD-SCNC: 108 MMOL/L (ref 98–107)
CO2 SERPL-SCNC: 19 MMOL/L (ref 22–31)
CREAT SERPL-MCNC: 1.34 MG/DL (ref 0.6–1.1)
GFR SERPL CREATININE-BSD FRML MDRD: 38 ML/MIN/1.73M2
GLUCOSE BLD-MCNC: 84 MG/DL (ref 70–125)
MAGNESIUM SERPL-MCNC: 1.6 MG/DL (ref 1.8–2.6)
POTASSIUM BLD-SCNC: 4.9 MMOL/L (ref 3.5–5)
PROT SERPL-MCNC: 6.2 G/DL (ref 6–8)
SODIUM SERPL-SCNC: 140 MMOL/L (ref 136–145)

## 2018-11-07 ENCOUNTER — HOME CARE/HOSPICE - HEALTHEAST (OUTPATIENT)
Dept: HOME HEALTH SERVICES | Facility: HOME HEALTH | Age: 83
End: 2018-11-07

## 2018-11-08 ENCOUNTER — OFFICE VISIT - HEALTHEAST (OUTPATIENT)
Dept: WOUND CARE | Facility: HOSPITAL | Age: 83
End: 2018-11-08

## 2018-11-08 DIAGNOSIS — K56.609 COLON OBSTRUCTION (H): ICD-10-CM

## 2018-11-09 ENCOUNTER — HOME CARE/HOSPICE - HEALTHEAST (OUTPATIENT)
Dept: HOME HEALTH SERVICES | Facility: HOME HEALTH | Age: 83
End: 2018-11-09

## 2018-11-12 ENCOUNTER — HOME CARE/HOSPICE - HEALTHEAST (OUTPATIENT)
Dept: HOME HEALTH SERVICES | Facility: HOME HEALTH | Age: 83
End: 2018-11-12

## 2018-11-13 ENCOUNTER — HOME CARE/HOSPICE - HEALTHEAST (OUTPATIENT)
Dept: HOME HEALTH SERVICES | Facility: HOME HEALTH | Age: 83
End: 2018-11-13

## 2018-11-15 ENCOUNTER — HOME CARE/HOSPICE - HEALTHEAST (OUTPATIENT)
Dept: HOME HEALTH SERVICES | Facility: HOME HEALTH | Age: 83
End: 2018-11-15

## 2018-11-16 ENCOUNTER — HOME CARE/HOSPICE - HEALTHEAST (OUTPATIENT)
Dept: HOME HEALTH SERVICES | Facility: HOME HEALTH | Age: 83
End: 2018-11-16

## 2018-11-19 ENCOUNTER — HOME CARE/HOSPICE - HEALTHEAST (OUTPATIENT)
Dept: HOME HEALTH SERVICES | Facility: HOME HEALTH | Age: 83
End: 2018-11-19

## 2018-11-20 ENCOUNTER — HOME CARE/HOSPICE - HEALTHEAST (OUTPATIENT)
Dept: HOME HEALTH SERVICES | Facility: HOME HEALTH | Age: 83
End: 2018-11-20

## 2018-11-21 ENCOUNTER — HOME CARE/HOSPICE - HEALTHEAST (OUTPATIENT)
Dept: HOME HEALTH SERVICES | Facility: HOME HEALTH | Age: 83
End: 2018-11-21

## 2018-11-23 ENCOUNTER — HOME CARE/HOSPICE - HEALTHEAST (OUTPATIENT)
Dept: HOME HEALTH SERVICES | Facility: HOME HEALTH | Age: 83
End: 2018-11-23

## 2018-11-26 ENCOUNTER — HOME CARE/HOSPICE - HEALTHEAST (OUTPATIENT)
Dept: HOME HEALTH SERVICES | Facility: HOME HEALTH | Age: 83
End: 2018-11-26

## 2018-11-27 ENCOUNTER — RECORDS - HEALTHEAST (OUTPATIENT)
Dept: LAB | Facility: CLINIC | Age: 83
End: 2018-11-27

## 2018-11-27 ENCOUNTER — HOME CARE/HOSPICE - HEALTHEAST (OUTPATIENT)
Dept: HOME HEALTH SERVICES | Facility: HOME HEALTH | Age: 83
End: 2018-11-27

## 2018-11-27 LAB — BNP SERPL-MCNC: 39 PG/ML (ref 0–167)

## 2018-11-28 LAB
ALBUMIN SERPL-MCNC: 4.3 G/DL (ref 3.5–5)
ALP SERPL-CCNC: 110 U/L (ref 45–120)
ALT SERPL W P-5'-P-CCNC: 12 U/L (ref 0–45)
ANION GAP SERPL CALCULATED.3IONS-SCNC: 13 MMOL/L (ref 5–18)
AST SERPL W P-5'-P-CCNC: 16 U/L (ref 0–40)
BILIRUB SERPL-MCNC: 0.3 MG/DL (ref 0–1)
BUN SERPL-MCNC: 118 MG/DL (ref 8–28)
CALCIUM SERPL-MCNC: 10.9 MG/DL (ref 8.5–10.5)
CHLORIDE BLD-SCNC: 101 MMOL/L (ref 98–107)
CO2 SERPL-SCNC: 15 MMOL/L (ref 22–31)
CREAT SERPL-MCNC: 2.23 MG/DL (ref 0.6–1.1)
GFR SERPL CREATININE-BSD FRML MDRD: 21 ML/MIN/1.73M2
GLUCOSE BLD-MCNC: 137 MG/DL (ref 70–125)
MAGNESIUM SERPL-MCNC: 2.1 MG/DL (ref 1.8–2.6)
POTASSIUM BLD-SCNC: 6.2 MMOL/L (ref 3.5–5)
PROT SERPL-MCNC: 8.2 G/DL (ref 6–8)
SODIUM SERPL-SCNC: 129 MMOL/L (ref 136–145)

## 2018-11-29 ENCOUNTER — HOME CARE/HOSPICE - HEALTHEAST (OUTPATIENT)
Dept: HOME HEALTH SERVICES | Facility: HOME HEALTH | Age: 83
End: 2018-11-29

## 2018-11-29 ENCOUNTER — RECORDS - HEALTHEAST (OUTPATIENT)
Dept: LAB | Facility: CLINIC | Age: 83
End: 2018-11-29

## 2018-11-29 ENCOUNTER — RECORDS - HEALTHEAST (OUTPATIENT)
Dept: ADMINISTRATIVE | Facility: OTHER | Age: 83
End: 2018-11-29

## 2018-11-30 ENCOUNTER — HOME CARE/HOSPICE - HEALTHEAST (OUTPATIENT)
Dept: HOME HEALTH SERVICES | Facility: HOME HEALTH | Age: 83
End: 2018-11-30

## 2018-11-30 ENCOUNTER — RECORDS - HEALTHEAST (OUTPATIENT)
Dept: LAB | Facility: HOSPITAL | Age: 83
End: 2018-11-30

## 2018-11-30 LAB
HGB BLD-MCNC: 14.2 G/DL (ref 12–16)
POTASSIUM BLD-SCNC: 6 MMOL/L (ref 3.5–5)

## 2018-12-01 LAB — BACTERIA SPEC CULT: ABNORMAL

## 2018-12-03 ENCOUNTER — HOME CARE/HOSPICE - HEALTHEAST (OUTPATIENT)
Dept: HOME HEALTH SERVICES | Facility: HOME HEALTH | Age: 83
End: 2018-12-03

## 2018-12-07 ENCOUNTER — HOME CARE/HOSPICE - HEALTHEAST (OUTPATIENT)
Dept: HOME HEALTH SERVICES | Facility: HOME HEALTH | Age: 83
End: 2018-12-07

## 2018-12-07 ASSESSMENT — MIFFLIN-ST. JEOR: SCORE: 1242.82

## 2018-12-10 ENCOUNTER — HOME CARE/HOSPICE - HEALTHEAST (OUTPATIENT)
Dept: HOME HEALTH SERVICES | Facility: HOME HEALTH | Age: 83
End: 2018-12-10

## 2018-12-11 ENCOUNTER — HOME CARE/HOSPICE - HEALTHEAST (OUTPATIENT)
Dept: HOME HEALTH SERVICES | Facility: HOME HEALTH | Age: 83
End: 2018-12-11

## 2018-12-12 ENCOUNTER — HOME CARE/HOSPICE - HEALTHEAST (OUTPATIENT)
Dept: HOME HEALTH SERVICES | Facility: HOME HEALTH | Age: 83
End: 2018-12-12

## 2018-12-13 ENCOUNTER — RECORDS - HEALTHEAST (OUTPATIENT)
Dept: LAB | Facility: CLINIC | Age: 83
End: 2018-12-13

## 2018-12-13 ENCOUNTER — HOME CARE/HOSPICE - HEALTHEAST (OUTPATIENT)
Dept: HOME HEALTH SERVICES | Facility: HOME HEALTH | Age: 83
End: 2018-12-13

## 2018-12-13 LAB
ALBUMIN SERPL-MCNC: 3.5 G/DL (ref 3.5–5)
ANION GAP SERPL CALCULATED.3IONS-SCNC: 12 MMOL/L (ref 5–18)
BUN SERPL-MCNC: 52 MG/DL (ref 8–28)
CALCIUM SERPL-MCNC: 9.8 MG/DL (ref 8.5–10.5)
CHLORIDE BLD-SCNC: 107 MMOL/L (ref 98–107)
CO2 SERPL-SCNC: 17 MMOL/L (ref 22–31)
CREAT SERPL-MCNC: 1.48 MG/DL (ref 0.6–1.1)
GFR SERPL CREATININE-BSD FRML MDRD: 34 ML/MIN/1.73M2
GLUCOSE BLD-MCNC: 147 MG/DL (ref 70–125)
PHOSPHATE SERPL-MCNC: 4.8 MG/DL (ref 2.5–4.5)
POTASSIUM BLD-SCNC: 4.8 MMOL/L (ref 3.5–5)
SODIUM SERPL-SCNC: 136 MMOL/L (ref 136–145)

## 2018-12-14 ENCOUNTER — HOME CARE/HOSPICE - HEALTHEAST (OUTPATIENT)
Dept: HOME HEALTH SERVICES | Facility: HOME HEALTH | Age: 83
End: 2018-12-14

## 2018-12-17 ENCOUNTER — HOME CARE/HOSPICE - HEALTHEAST (OUTPATIENT)
Dept: HOME HEALTH SERVICES | Facility: HOME HEALTH | Age: 83
End: 2018-12-17

## 2018-12-18 ENCOUNTER — HOME CARE/HOSPICE - HEALTHEAST (OUTPATIENT)
Dept: HOME HEALTH SERVICES | Facility: HOME HEALTH | Age: 83
End: 2018-12-18

## 2018-12-20 ENCOUNTER — HOME CARE/HOSPICE - HEALTHEAST (OUTPATIENT)
Dept: HOME HEALTH SERVICES | Facility: HOME HEALTH | Age: 83
End: 2018-12-20

## 2018-12-21 ENCOUNTER — HOME CARE/HOSPICE - HEALTHEAST (OUTPATIENT)
Dept: HOME HEALTH SERVICES | Facility: HOME HEALTH | Age: 83
End: 2018-12-21

## 2018-12-24 ENCOUNTER — HOME CARE/HOSPICE - HEALTHEAST (OUTPATIENT)
Dept: HOME HEALTH SERVICES | Facility: HOME HEALTH | Age: 83
End: 2018-12-24

## 2018-12-26 ENCOUNTER — HOME CARE/HOSPICE - HEALTHEAST (OUTPATIENT)
Dept: HOME HEALTH SERVICES | Facility: HOME HEALTH | Age: 83
End: 2018-12-26

## 2018-12-27 ENCOUNTER — HOME CARE/HOSPICE - HEALTHEAST (OUTPATIENT)
Dept: HOME HEALTH SERVICES | Facility: HOME HEALTH | Age: 83
End: 2018-12-27

## 2018-12-28 ENCOUNTER — COMMUNICATION - HEALTHEAST (OUTPATIENT)
Dept: ADMINISTRATIVE | Facility: CLINIC | Age: 83
End: 2018-12-28

## 2018-12-28 ENCOUNTER — HOME CARE/HOSPICE - HEALTHEAST (OUTPATIENT)
Dept: HOME HEALTH SERVICES | Facility: HOME HEALTH | Age: 83
End: 2018-12-28

## 2018-12-31 ENCOUNTER — HOME CARE/HOSPICE - HEALTHEAST (OUTPATIENT)
Dept: HOME HEALTH SERVICES | Facility: HOME HEALTH | Age: 83
End: 2018-12-31

## 2019-01-02 ENCOUNTER — HOME CARE/HOSPICE - HEALTHEAST (OUTPATIENT)
Dept: HOME HEALTH SERVICES | Facility: HOME HEALTH | Age: 84
End: 2019-01-02

## 2019-01-03 ENCOUNTER — HOME CARE/HOSPICE - HEALTHEAST (OUTPATIENT)
Dept: HOME HEALTH SERVICES | Facility: HOME HEALTH | Age: 84
End: 2019-01-03

## 2019-01-11 ENCOUNTER — RECORDS - HEALTHEAST (OUTPATIENT)
Dept: LAB | Facility: CLINIC | Age: 84
End: 2019-01-11

## 2019-01-11 LAB
ALBUMIN SERPL-MCNC: 3.4 G/DL (ref 3.5–5)
ANION GAP SERPL CALCULATED.3IONS-SCNC: 14 MMOL/L (ref 5–18)
BUN SERPL-MCNC: 34 MG/DL (ref 8–28)
CALCIUM SERPL-MCNC: 9.5 MG/DL (ref 8.5–10.5)
CHLORIDE BLD-SCNC: 112 MMOL/L (ref 98–107)
CO2 SERPL-SCNC: 11 MMOL/L (ref 22–31)
CREAT SERPL-MCNC: 1.3 MG/DL (ref 0.6–1.1)
GFR SERPL CREATININE-BSD FRML MDRD: 39 ML/MIN/1.73M2
GLUCOSE BLD-MCNC: 135 MG/DL (ref 70–125)
MAGNESIUM SERPL-MCNC: 1.7 MG/DL (ref 1.8–2.6)
PHOSPHATE SERPL-MCNC: 4.5 MG/DL (ref 2.5–4.5)
POTASSIUM BLD-SCNC: 4.7 MMOL/L (ref 3.5–5)
SODIUM SERPL-SCNC: 137 MMOL/L (ref 136–145)

## 2019-01-24 ENCOUNTER — AMBULATORY - HEALTHEAST (OUTPATIENT)
Dept: SCHEDULING | Facility: CLINIC | Age: 84
End: 2019-01-24

## 2019-01-24 DIAGNOSIS — Z71.89 OSTOMY NURSE CONSULTATION: ICD-10-CM

## 2019-01-28 ENCOUNTER — OFFICE VISIT - HEALTHEAST (OUTPATIENT)
Dept: RHEUMATOLOGY | Facility: CLINIC | Age: 84
End: 2019-01-28

## 2019-01-28 DIAGNOSIS — N18.30 STAGE 3 CHRONIC KIDNEY DISEASE (H): ICD-10-CM

## 2019-01-28 DIAGNOSIS — Z79.4 TYPE 2 DIABETES MELLITUS WITH COMPLICATION, WITH LONG-TERM CURRENT USE OF INSULIN (H): ICD-10-CM

## 2019-01-28 DIAGNOSIS — E11.8 TYPE 2 DIABETES MELLITUS WITH COMPLICATION, WITH LONG-TERM CURRENT USE OF INSULIN (H): ICD-10-CM

## 2019-01-28 DIAGNOSIS — M06.4 INFLAMMATORY POLYARTHRITIS (H): ICD-10-CM

## 2019-01-28 DIAGNOSIS — M15.0 PRIMARY OSTEOARTHRITIS INVOLVING MULTIPLE JOINTS: ICD-10-CM

## 2019-01-28 DIAGNOSIS — M11.20 CHONDROCALCINOSIS: ICD-10-CM

## 2019-01-31 ENCOUNTER — RECORDS - HEALTHEAST (OUTPATIENT)
Dept: ADMINISTRATIVE | Facility: OTHER | Age: 84
End: 2019-01-31

## 2019-01-31 ENCOUNTER — AMBULATORY - HEALTHEAST (OUTPATIENT)
Dept: CARDIOLOGY | Facility: CLINIC | Age: 84
End: 2019-01-31

## 2019-02-15 ENCOUNTER — COMMUNICATION - HEALTHEAST (OUTPATIENT)
Dept: ADMINISTRATIVE | Facility: CLINIC | Age: 84
End: 2019-02-15

## 2019-02-15 DIAGNOSIS — M06.4 INFLAMMATORY POLYARTHRITIS (H): ICD-10-CM

## 2019-03-05 ENCOUNTER — RECORDS - HEALTHEAST (OUTPATIENT)
Dept: ADMINISTRATIVE | Facility: OTHER | Age: 84
End: 2019-03-05

## 2019-03-09 ENCOUNTER — COMMUNICATION - HEALTHEAST (OUTPATIENT)
Dept: RHEUMATOLOGY | Facility: CLINIC | Age: 84
End: 2019-03-09

## 2019-03-09 DIAGNOSIS — M06.4 INFLAMMATORY POLYARTHRITIS (H): ICD-10-CM

## 2019-03-12 ENCOUNTER — OFFICE VISIT - HEALTHEAST (OUTPATIENT)
Dept: WOUND CARE | Facility: HOSPITAL | Age: 84
End: 2019-03-12

## 2019-03-12 DIAGNOSIS — K56.609 COLON OBSTRUCTION (H): ICD-10-CM

## 2019-03-13 ENCOUNTER — OFFICE VISIT - HEALTHEAST (OUTPATIENT)
Dept: RHEUMATOLOGY | Facility: CLINIC | Age: 84
End: 2019-03-13

## 2019-03-13 DIAGNOSIS — M06.4 INFLAMMATORY POLYARTHRITIS (H): ICD-10-CM

## 2019-03-13 DIAGNOSIS — M11.20 CHONDROCALCINOSIS: ICD-10-CM

## 2019-03-13 DIAGNOSIS — N18.30 STAGE 3 CHRONIC KIDNEY DISEASE (H): ICD-10-CM

## 2019-03-29 ENCOUNTER — RECORDS - HEALTHEAST (OUTPATIENT)
Dept: LAB | Facility: CLINIC | Age: 84
End: 2019-03-29

## 2019-03-29 LAB
ALBUMIN SERPL-MCNC: 3.8 G/DL (ref 3.5–5)
ANION GAP SERPL CALCULATED.3IONS-SCNC: 12 MMOL/L (ref 5–18)
BUN SERPL-MCNC: 35 MG/DL (ref 8–28)
CALCIUM SERPL-MCNC: 9.9 MG/DL (ref 8.5–10.5)
CHLORIDE BLD-SCNC: 110 MMOL/L (ref 98–107)
CO2 SERPL-SCNC: 20 MMOL/L (ref 22–31)
CREAT SERPL-MCNC: 1.02 MG/DL (ref 0.6–1.1)
GFR SERPL CREATININE-BSD FRML MDRD: 51 ML/MIN/1.73M2
GLUCOSE BLD-MCNC: 112 MG/DL (ref 70–125)
MAGNESIUM SERPL-MCNC: 1.8 MG/DL (ref 1.8–2.6)
PHOSPHATE SERPL-MCNC: 4.7 MG/DL (ref 2.5–4.5)
POTASSIUM BLD-SCNC: 4 MMOL/L (ref 3.5–5)
SODIUM SERPL-SCNC: 142 MMOL/L (ref 136–145)

## 2019-04-22 ENCOUNTER — AMBULATORY - HEALTHEAST (OUTPATIENT)
Dept: CARDIOLOGY | Facility: CLINIC | Age: 84
End: 2019-04-22

## 2019-04-22 ENCOUNTER — RECORDS - HEALTHEAST (OUTPATIENT)
Dept: ADMINISTRATIVE | Facility: OTHER | Age: 84
End: 2019-04-22

## 2019-04-24 ENCOUNTER — AMBULATORY - HEALTHEAST (OUTPATIENT)
Dept: CARDIOLOGY | Facility: CLINIC | Age: 84
End: 2019-04-24

## 2019-04-24 ENCOUNTER — OFFICE VISIT - HEALTHEAST (OUTPATIENT)
Dept: CARDIOLOGY | Facility: CLINIC | Age: 84
End: 2019-04-24

## 2019-04-24 DIAGNOSIS — I50.32 CHRONIC DIASTOLIC CHF (CONGESTIVE HEART FAILURE) (H): ICD-10-CM

## 2019-04-24 DIAGNOSIS — E11.9 DIABETES MELLITUS, TYPE II, INSULIN DEPENDENT (H): ICD-10-CM

## 2019-04-24 DIAGNOSIS — I48.20 CHRONIC ATRIAL FIBRILLATION (H): ICD-10-CM

## 2019-04-24 DIAGNOSIS — I10 BENIGN ESSENTIAL HYPERTENSION: ICD-10-CM

## 2019-04-24 DIAGNOSIS — Z86.79 HISTORY OF CORONARY ARTERY DISEASE: ICD-10-CM

## 2019-04-24 DIAGNOSIS — K29.81 GASTROINTESTINAL HEMORRHAGE ASSOCIATED WITH DUODENITIS: ICD-10-CM

## 2019-04-24 DIAGNOSIS — N18.30 STAGE 3 CHRONIC KIDNEY DISEASE (H): ICD-10-CM

## 2019-04-24 DIAGNOSIS — Z79.4 DIABETES MELLITUS, TYPE II, INSULIN DEPENDENT (H): ICD-10-CM

## 2019-04-24 DIAGNOSIS — E11.8 TYPE 2 DIABETES MELLITUS WITH COMPLICATION, WITH LONG-TERM CURRENT USE OF INSULIN (H): ICD-10-CM

## 2019-04-24 DIAGNOSIS — K51.911: ICD-10-CM

## 2019-04-24 DIAGNOSIS — Z79.4 TYPE 2 DIABETES MELLITUS WITH COMPLICATION, WITH LONG-TERM CURRENT USE OF INSULIN (H): ICD-10-CM

## 2019-04-24 RX ORDER — SPIRONOLACTONE 25 MG/1
12.5 TABLET ORAL DAILY
Qty: 30 TABLET | Refills: 5 | Status: SHIPPED | OUTPATIENT
Start: 2019-04-24

## 2019-04-24 ASSESSMENT — MIFFLIN-ST. JEOR: SCORE: 1412.92

## 2019-05-07 ENCOUNTER — OFFICE VISIT - HEALTHEAST (OUTPATIENT)
Dept: CARDIOLOGY | Facility: CLINIC | Age: 84
End: 2019-05-07

## 2019-05-07 DIAGNOSIS — I50.32 DIASTOLIC DYSFUNCTION WITH CHRONIC HEART FAILURE (H): ICD-10-CM

## 2019-05-07 DIAGNOSIS — I10 BENIGN ESSENTIAL HYPERTENSION: ICD-10-CM

## 2019-05-07 DIAGNOSIS — I48.20 CHRONIC ATRIAL FIBRILLATION (H): ICD-10-CM

## 2019-05-07 DIAGNOSIS — N18.30 STAGE 3 CHRONIC KIDNEY DISEASE (H): ICD-10-CM

## 2019-05-07 LAB
ANION GAP SERPL CALCULATED.3IONS-SCNC: 10 MMOL/L (ref 5–18)
BUN SERPL-MCNC: 45 MG/DL (ref 8–28)
CALCIUM SERPL-MCNC: 10.2 MG/DL (ref 8.5–10.5)
CHLORIDE BLD-SCNC: 108 MMOL/L (ref 98–107)
CO2 SERPL-SCNC: 23 MMOL/L (ref 22–31)
CREAT SERPL-MCNC: 1.01 MG/DL (ref 0.6–1.1)
GFR SERPL CREATININE-BSD FRML MDRD: 52 ML/MIN/1.73M2
GLUCOSE BLD-MCNC: 112 MG/DL (ref 70–125)
POTASSIUM BLD-SCNC: 4.7 MMOL/L (ref 3.5–5)
SODIUM SERPL-SCNC: 141 MMOL/L (ref 136–145)

## 2019-05-07 ASSESSMENT — MIFFLIN-ST. JEOR: SCORE: 1408.38

## 2019-05-08 ENCOUNTER — COMMUNICATION - HEALTHEAST (OUTPATIENT)
Dept: ADMINISTRATIVE | Facility: CLINIC | Age: 84
End: 2019-05-08

## 2019-05-08 ENCOUNTER — AMBULATORY - HEALTHEAST (OUTPATIENT)
Dept: CARDIOLOGY | Facility: CLINIC | Age: 84
End: 2019-05-08

## 2019-05-08 DIAGNOSIS — I50.32 CHRONIC DIASTOLIC CHF (CONGESTIVE HEART FAILURE) (H): ICD-10-CM

## 2019-05-29 ENCOUNTER — OFFICE VISIT - HEALTHEAST (OUTPATIENT)
Dept: RHEUMATOLOGY | Facility: CLINIC | Age: 84
End: 2019-05-29

## 2019-05-29 DIAGNOSIS — M06.4 INFLAMMATORY POLYARTHRITIS (H): ICD-10-CM

## 2019-05-29 DIAGNOSIS — N18.30 STAGE 3 CHRONIC KIDNEY DISEASE (H): ICD-10-CM

## 2019-05-29 DIAGNOSIS — M11.20 CHONDROCALCINOSIS: ICD-10-CM

## 2019-05-29 DIAGNOSIS — M25.50 POLYARTHRALGIA: ICD-10-CM

## 2019-05-29 LAB
ALT SERPL W P-5'-P-CCNC: 15 U/L (ref 0–45)
BASOPHILS # BLD AUTO: 0.1 THOU/UL (ref 0–0.2)
BASOPHILS NFR BLD AUTO: 1 % (ref 0–2)
C REACTIVE PROTEIN LHE: 0.6 MG/DL (ref 0–0.8)
CREAT SERPL-MCNC: 1.18 MG/DL (ref 0.6–1.1)
EOSINOPHIL # BLD AUTO: 0.1 THOU/UL (ref 0–0.4)
EOSINOPHIL NFR BLD AUTO: 1 % (ref 0–6)
ERYTHROCYTE [DISTWIDTH] IN BLOOD BY AUTOMATED COUNT: 13.5 % (ref 11–14.5)
ERYTHROCYTE [SEDIMENTATION RATE] IN BLOOD BY WESTERGREN METHOD: 9 MM/HR (ref 0–20)
GFR SERPL CREATININE-BSD FRML MDRD: 43 ML/MIN/1.73M2
HCT VFR BLD AUTO: 40 % (ref 35–47)
HGB BLD-MCNC: 13.1 G/DL (ref 12–16)
LYMPHOCYTES # BLD AUTO: 1 THOU/UL (ref 0.8–4.4)
LYMPHOCYTES NFR BLD AUTO: 9 % (ref 20–40)
MCH RBC QN AUTO: 31 PG (ref 27–34)
MCHC RBC AUTO-ENTMCNC: 32.8 G/DL (ref 32–36)
MCV RBC AUTO: 95 FL (ref 80–100)
MONOCYTES # BLD AUTO: 0.4 THOU/UL (ref 0–0.9)
MONOCYTES NFR BLD AUTO: 4 % (ref 2–10)
NEUTROPHILS # BLD AUTO: 9.4 THOU/UL (ref 2–7.7)
NEUTROPHILS NFR BLD AUTO: 85 % (ref 50–70)
PLATELET # BLD AUTO: ABNORMAL THOU/UL (ref 140–440)
PMV BLD AUTO: ABNORMAL FL (ref 8.5–12.5)
RBC # BLD AUTO: 4.22 MILL/UL (ref 3.8–5.4)
URATE SERPL-MCNC: 7.4 MG/DL (ref 2–7.5)
WBC: 11.1 THOU/UL (ref 4–11)

## 2019-05-29 ASSESSMENT — MIFFLIN-ST. JEOR: SCORE: 1413.37

## 2019-06-04 ENCOUNTER — COMMUNICATION - HEALTHEAST (OUTPATIENT)
Dept: RHEUMATOLOGY | Facility: CLINIC | Age: 84
End: 2019-06-04

## 2019-06-04 DIAGNOSIS — M06.4 INFLAMMATORY POLYARTHRITIS (H): ICD-10-CM

## 2019-06-26 ENCOUNTER — AMBULATORY - HEALTHEAST (OUTPATIENT)
Dept: LAB | Facility: CLINIC | Age: 84
End: 2019-06-26

## 2019-06-26 DIAGNOSIS — M06.4 INFLAMMATORY POLYARTHRITIS (H): ICD-10-CM

## 2019-06-26 DIAGNOSIS — M25.50 POLYARTHRALGIA: ICD-10-CM

## 2019-06-26 LAB
ALBUMIN SERPL-MCNC: 3.8 G/DL (ref 3.5–5)
ALT SERPL W P-5'-P-CCNC: 18 U/L (ref 0–45)
CREAT SERPL-MCNC: 1.09 MG/DL (ref 0.6–1.1)
ERYTHROCYTE [DISTWIDTH] IN BLOOD BY AUTOMATED COUNT: 12.6 % (ref 11–14.5)
GFR SERPL CREATININE-BSD FRML MDRD: 48 ML/MIN/1.73M2
HCT VFR BLD AUTO: 41.1 % (ref 35–47)
HGB BLD-MCNC: 13.5 G/DL (ref 12–16)
MCH RBC QN AUTO: 30.9 PG (ref 27–34)
MCHC RBC AUTO-ENTMCNC: 32.9 G/DL (ref 32–36)
MCV RBC AUTO: 94 FL (ref 80–100)
PLATELET # BLD AUTO: 166 THOU/UL (ref 140–440)
PMV BLD AUTO: 8.1 FL (ref 7–10)
RBC # BLD AUTO: 4.37 MILL/UL (ref 3.8–5.4)
WBC: 9.3 THOU/UL (ref 4–11)

## 2019-07-31 ENCOUNTER — OFFICE VISIT - HEALTHEAST (OUTPATIENT)
Dept: RHEUMATOLOGY | Facility: CLINIC | Age: 84
End: 2019-07-31

## 2019-07-31 DIAGNOSIS — M25.50 POLYARTHRALGIA: ICD-10-CM

## 2019-07-31 DIAGNOSIS — M11.20 CHONDROCALCINOSIS: ICD-10-CM

## 2019-07-31 DIAGNOSIS — M15.0 PRIMARY OSTEOARTHRITIS INVOLVING MULTIPLE JOINTS: ICD-10-CM

## 2019-07-31 DIAGNOSIS — M06.4 INFLAMMATORY POLYARTHRITIS (H): ICD-10-CM

## 2019-07-31 DIAGNOSIS — K51.911: ICD-10-CM

## 2019-07-31 LAB
ALBUMIN SERPL-MCNC: 4 G/DL (ref 3.5–5)
ALT SERPL W P-5'-P-CCNC: 20 U/L (ref 0–45)
CREAT SERPL-MCNC: 1.1 MG/DL (ref 0.6–1.1)
ERYTHROCYTE [DISTWIDTH] IN BLOOD BY AUTOMATED COUNT: 13.5 % (ref 11–14.5)
GFR SERPL CREATININE-BSD FRML MDRD: 47 ML/MIN/1.73M2
HCT VFR BLD AUTO: 39.5 % (ref 35–47)
HGB BLD-MCNC: 13.1 G/DL (ref 12–16)
MCH RBC QN AUTO: 31.5 PG (ref 27–34)
MCHC RBC AUTO-ENTMCNC: 33.1 G/DL (ref 32–36)
MCV RBC AUTO: 95 FL (ref 80–100)
PLATELET # BLD AUTO: 151 THOU/UL (ref 140–440)
PMV BLD AUTO: 8.2 FL (ref 7–10)
RBC # BLD AUTO: 4.15 MILL/UL (ref 3.8–5.4)
WBC: 10.8 THOU/UL (ref 4–11)

## 2019-08-07 ENCOUNTER — AMBULATORY - HEALTHEAST (OUTPATIENT)
Dept: SCHEDULING | Facility: CLINIC | Age: 84
End: 2019-08-07

## 2019-08-07 DIAGNOSIS — T14.8XXA WOUND INFECTION: ICD-10-CM

## 2019-08-07 DIAGNOSIS — L08.9 WOUND INFECTION: ICD-10-CM

## 2019-08-13 ENCOUNTER — OFFICE VISIT - HEALTHEAST (OUTPATIENT)
Dept: WOUND CARE | Facility: HOSPITAL | Age: 84
End: 2019-08-13

## 2019-08-13 DIAGNOSIS — T14.8XXA WOUND INFECTION: ICD-10-CM

## 2019-08-13 DIAGNOSIS — L08.9 WOUND INFECTION: ICD-10-CM

## 2019-08-28 ENCOUNTER — AMBULATORY - HEALTHEAST (OUTPATIENT)
Dept: LAB | Facility: CLINIC | Age: 84
End: 2019-08-28

## 2019-08-28 DIAGNOSIS — M25.50 POLYARTHRALGIA: ICD-10-CM

## 2019-08-28 DIAGNOSIS — M06.4 INFLAMMATORY POLYARTHRITIS (H): ICD-10-CM

## 2019-08-28 LAB
ALBUMIN SERPL-MCNC: 3.8 G/DL (ref 3.5–5)
ALT SERPL W P-5'-P-CCNC: 19 U/L (ref 0–45)
CREAT SERPL-MCNC: 1.1 MG/DL (ref 0.6–1.1)
ERYTHROCYTE [DISTWIDTH] IN BLOOD BY AUTOMATED COUNT: 12.6 % (ref 11–14.5)
GFR SERPL CREATININE-BSD FRML MDRD: 47 ML/MIN/1.73M2
HCT VFR BLD AUTO: 38.5 % (ref 35–47)
HGB BLD-MCNC: 12.9 G/DL (ref 12–16)
MCH RBC QN AUTO: 32.6 PG (ref 27–34)
MCHC RBC AUTO-ENTMCNC: 33.6 G/DL (ref 32–36)
MCV RBC AUTO: 97 FL (ref 80–100)
PLATELET # BLD AUTO: 151 THOU/UL (ref 140–440)
PMV BLD AUTO: 8.1 FL (ref 7–10)
RBC # BLD AUTO: 3.97 MILL/UL (ref 3.8–5.4)
WBC: 7.5 THOU/UL (ref 4–11)

## 2019-09-09 ENCOUNTER — RECORDS - HEALTHEAST (OUTPATIENT)
Dept: ADMINISTRATIVE | Facility: OTHER | Age: 84
End: 2019-09-09

## 2019-09-10 ENCOUNTER — RECORDS - HEALTHEAST (OUTPATIENT)
Dept: LAB | Facility: CLINIC | Age: 84
End: 2019-09-10

## 2019-09-10 ENCOUNTER — HOSPITAL ENCOUNTER (OUTPATIENT)
Dept: ULTRASOUND IMAGING | Facility: HOSPITAL | Age: 84
Discharge: HOME | End: 2019-09-10
Attending: INTERNAL MEDICINE

## 2019-09-10 DIAGNOSIS — R60.0 LOWER EXTREMITY EDEMA: ICD-10-CM

## 2019-09-10 LAB
ALBUMIN SERPL-MCNC: 3.7 G/DL (ref 3.5–5)
ALP SERPL-CCNC: 73 U/L (ref 45–120)
ALT SERPL W P-5'-P-CCNC: 21 U/L (ref 0–45)
ANION GAP SERPL CALCULATED.3IONS-SCNC: 11 MMOL/L (ref 5–18)
AST SERPL W P-5'-P-CCNC: 25 U/L (ref 0–40)
BILIRUB SERPL-MCNC: 0.4 MG/DL (ref 0–1)
BUN SERPL-MCNC: 36 MG/DL (ref 8–28)
CALCIUM SERPL-MCNC: 9.4 MG/DL (ref 8.5–10.5)
CHLORIDE BLD-SCNC: 110 MMOL/L (ref 98–107)
CHOLEST SERPL-MCNC: 143 MG/DL
CO2 SERPL-SCNC: 19 MMOL/L (ref 22–31)
CREAT SERPL-MCNC: 1.08 MG/DL (ref 0.6–1.1)
FASTING STATUS PATIENT QL REPORTED: NO
GFR SERPL CREATININE-BSD FRML MDRD: 48 ML/MIN/1.73M2
GLUCOSE BLD-MCNC: 136 MG/DL (ref 70–125)
HDLC SERPL-MCNC: 62 MG/DL
LDLC SERPL CALC-MCNC: 39 MG/DL
POTASSIUM BLD-SCNC: 4.4 MMOL/L (ref 3.5–5)
PROT SERPL-MCNC: 6.5 G/DL (ref 6–8)
SODIUM SERPL-SCNC: 140 MMOL/L (ref 136–145)
TRIGL SERPL-MCNC: 211 MG/DL

## 2019-10-22 ENCOUNTER — COMMUNICATION - HEALTHEAST (OUTPATIENT)
Dept: RHEUMATOLOGY | Facility: CLINIC | Age: 84
End: 2019-10-22

## 2019-10-22 DIAGNOSIS — M06.4 INFLAMMATORY POLYARTHRITIS (H): ICD-10-CM

## 2019-10-25 ENCOUNTER — AMBULATORY - HEALTHEAST (OUTPATIENT)
Dept: LAB | Facility: CLINIC | Age: 84
End: 2019-10-25

## 2019-10-25 DIAGNOSIS — M25.50 POLYARTHRALGIA: ICD-10-CM

## 2019-10-25 DIAGNOSIS — M06.4 INFLAMMATORY POLYARTHRITIS (H): ICD-10-CM

## 2019-10-25 LAB
ALBUMIN SERPL-MCNC: 4 G/DL (ref 3.5–5)
ALT SERPL W P-5'-P-CCNC: 24 U/L (ref 0–45)
CREAT SERPL-MCNC: 1.25 MG/DL (ref 0.6–1.1)
ERYTHROCYTE [DISTWIDTH] IN BLOOD BY AUTOMATED COUNT: 11.1 % (ref 11–14.5)
GFR SERPL CREATININE-BSD FRML MDRD: 41 ML/MIN/1.73M2
HCT VFR BLD AUTO: 39.1 % (ref 35–47)
HGB BLD-MCNC: 13.2 G/DL (ref 12–16)
MCH RBC QN AUTO: 33 PG (ref 27–34)
MCHC RBC AUTO-ENTMCNC: 33.6 G/DL (ref 32–36)
MCV RBC AUTO: 98 FL (ref 80–100)
PLATELET # BLD AUTO: 159 THOU/UL (ref 140–440)
PMV BLD AUTO: 8.5 FL (ref 7–10)
RBC # BLD AUTO: 3.99 MILL/UL (ref 3.8–5.4)
WBC: 7 THOU/UL (ref 4–11)

## 2019-10-30 ENCOUNTER — OFFICE VISIT - HEALTHEAST (OUTPATIENT)
Dept: RHEUMATOLOGY | Facility: CLINIC | Age: 84
End: 2019-10-30

## 2019-10-30 DIAGNOSIS — M11.20 CHONDROCALCINOSIS: ICD-10-CM

## 2019-10-30 DIAGNOSIS — M15.0 PRIMARY OSTEOARTHRITIS INVOLVING MULTIPLE JOINTS: ICD-10-CM

## 2019-10-30 DIAGNOSIS — M06.4 INFLAMMATORY POLYARTHRITIS (H): ICD-10-CM

## 2019-10-30 DIAGNOSIS — N18.30 STAGE 3 CHRONIC KIDNEY DISEASE (H): ICD-10-CM

## 2019-10-30 DIAGNOSIS — K51.911: ICD-10-CM

## 2019-10-30 ASSESSMENT — MIFFLIN-ST. JEOR: SCORE: 1412.92

## 2019-12-18 ENCOUNTER — COMMUNICATION - HEALTHEAST (OUTPATIENT)
Dept: RHEUMATOLOGY | Facility: CLINIC | Age: 84
End: 2019-12-18

## 2019-12-18 DIAGNOSIS — M06.4 INFLAMMATORY POLYARTHRITIS (H): ICD-10-CM

## 2020-01-28 ENCOUNTER — AMBULATORY - HEALTHEAST (OUTPATIENT)
Dept: LAB | Facility: CLINIC | Age: 85
End: 2020-01-28

## 2020-01-28 DIAGNOSIS — M06.4 INFLAMMATORY POLYARTHRITIS (H): ICD-10-CM

## 2020-01-28 DIAGNOSIS — M25.50 POLYARTHRALGIA: ICD-10-CM

## 2020-01-28 LAB
ALBUMIN SERPL-MCNC: 3.9 G/DL (ref 3.5–5)
ALT SERPL W P-5'-P-CCNC: 17 U/L (ref 0–45)
CREAT SERPL-MCNC: 1.21 MG/DL (ref 0.6–1.1)
ERYTHROCYTE [DISTWIDTH] IN BLOOD BY AUTOMATED COUNT: 11.2 % (ref 11–14.5)
GFR SERPL CREATININE-BSD FRML MDRD: 42 ML/MIN/1.73M2
HCT VFR BLD AUTO: 37.4 % (ref 35–47)
HGB BLD-MCNC: 12.4 G/DL (ref 12–16)
MCH RBC QN AUTO: 32 PG (ref 27–34)
MCHC RBC AUTO-ENTMCNC: 33.1 G/DL (ref 32–36)
MCV RBC AUTO: 97 FL (ref 80–100)
PLATELET # BLD AUTO: 172 THOU/UL (ref 140–440)
PMV BLD AUTO: 8.2 FL (ref 7–10)
RBC # BLD AUTO: 3.86 MILL/UL (ref 3.8–5.4)
WBC: 7.2 THOU/UL (ref 4–11)

## 2020-01-29 ENCOUNTER — COMMUNICATION - HEALTHEAST (OUTPATIENT)
Dept: RHEUMATOLOGY | Facility: CLINIC | Age: 85
End: 2020-01-29

## 2020-01-29 DIAGNOSIS — M06.4 INFLAMMATORY POLYARTHRITIS (H): ICD-10-CM

## 2020-01-30 ENCOUNTER — OFFICE VISIT - HEALTHEAST (OUTPATIENT)
Dept: RHEUMATOLOGY | Facility: CLINIC | Age: 85
End: 2020-01-30

## 2020-01-30 DIAGNOSIS — M06.4 INFLAMMATORY POLYARTHRITIS (H): ICD-10-CM

## 2020-01-30 DIAGNOSIS — M15.0 PRIMARY OSTEOARTHRITIS INVOLVING MULTIPLE JOINTS: ICD-10-CM

## 2020-01-30 DIAGNOSIS — K51.011 ULCERATIVE PANCOLITIS WITH RECTAL BLEEDING (H): ICD-10-CM

## 2020-01-30 DIAGNOSIS — M11.20 CHONDROCALCINOSIS: ICD-10-CM

## 2020-01-30 ASSESSMENT — MIFFLIN-ST. JEOR: SCORE: 1412.92

## 2020-03-06 ENCOUNTER — RECORDS - HEALTHEAST (OUTPATIENT)
Dept: ADMINISTRATIVE | Facility: OTHER | Age: 85
End: 2020-03-06

## 2020-03-06 ENCOUNTER — AMBULATORY - HEALTHEAST (OUTPATIENT)
Dept: CARDIOLOGY | Facility: CLINIC | Age: 85
End: 2020-03-06

## 2020-03-12 ENCOUNTER — OFFICE VISIT - HEALTHEAST (OUTPATIENT)
Dept: CARDIOLOGY | Facility: CLINIC | Age: 85
End: 2020-03-12

## 2020-03-12 DIAGNOSIS — K51.011 ULCERATIVE PANCOLITIS WITH RECTAL BLEEDING (H): ICD-10-CM

## 2020-03-12 DIAGNOSIS — N18.30 STAGE 3 CHRONIC KIDNEY DISEASE (H): ICD-10-CM

## 2020-03-12 DIAGNOSIS — I50.32 CHRONIC DIASTOLIC CHF (CONGESTIVE HEART FAILURE) (H): ICD-10-CM

## 2020-03-12 DIAGNOSIS — I25.10 CORONARY ARTERY DISEASE INVOLVING NATIVE CORONARY ARTERY OF NATIVE HEART WITHOUT ANGINA PECTORIS: ICD-10-CM

## 2020-03-12 DIAGNOSIS — I48.20 CHRONIC ATRIAL FIBRILLATION (H): ICD-10-CM

## 2020-03-12 DIAGNOSIS — I10 BENIGN ESSENTIAL HYPERTENSION: ICD-10-CM

## 2020-03-12 LAB
ANION GAP SERPL CALCULATED.3IONS-SCNC: 13 MMOL/L (ref 5–18)
BNP SERPL-MCNC: 98 PG/ML (ref 0–167)
BUN SERPL-MCNC: 49 MG/DL (ref 8–28)
CALCIUM SERPL-MCNC: 9.3 MG/DL (ref 8.5–10.5)
CHLORIDE BLD-SCNC: 109 MMOL/L (ref 98–107)
CO2 SERPL-SCNC: 18 MMOL/L (ref 22–31)
CREAT SERPL-MCNC: 1.12 MG/DL (ref 0.6–1.1)
GFR SERPL CREATININE-BSD FRML MDRD: 46 ML/MIN/1.73M2
GLUCOSE BLD-MCNC: 169 MG/DL (ref 70–125)
POTASSIUM BLD-SCNC: 4.6 MMOL/L (ref 3.5–5)
SODIUM SERPL-SCNC: 140 MMOL/L (ref 136–145)

## 2020-03-12 ASSESSMENT — MIFFLIN-ST. JEOR: SCORE: 1426.52

## 2020-03-13 ENCOUNTER — AMBULATORY - HEALTHEAST (OUTPATIENT)
Dept: CARDIOLOGY | Facility: CLINIC | Age: 85
End: 2020-03-13

## 2020-03-13 DIAGNOSIS — I50.32 CHRONIC DIASTOLIC CHF (CONGESTIVE HEART FAILURE) (H): ICD-10-CM

## 2020-03-13 DIAGNOSIS — I10 BENIGN ESSENTIAL HYPERTENSION: ICD-10-CM

## 2020-06-03 ENCOUNTER — COMMUNICATION - HEALTHEAST (OUTPATIENT)
Dept: RHEUMATOLOGY | Facility: CLINIC | Age: 85
End: 2020-06-03

## 2020-06-03 DIAGNOSIS — M06.4 INFLAMMATORY POLYARTHRITIS (H): ICD-10-CM

## 2020-06-10 ENCOUNTER — AMBULATORY - HEALTHEAST (OUTPATIENT)
Dept: LAB | Facility: CLINIC | Age: 85
End: 2020-06-10

## 2020-06-10 DIAGNOSIS — M06.4 INFLAMMATORY POLYARTHRITIS (H): ICD-10-CM

## 2020-06-10 LAB
ALBUMIN SERPL-MCNC: 3.9 G/DL (ref 3.5–5)
ALT SERPL W P-5'-P-CCNC: 19 U/L (ref 0–45)
CREAT SERPL-MCNC: 1.17 MG/DL (ref 0.6–1.1)
ERYTHROCYTE [DISTWIDTH] IN BLOOD BY AUTOMATED COUNT: 11.9 % (ref 11–14.5)
GFR SERPL CREATININE-BSD FRML MDRD: 44 ML/MIN/1.73M2
HCT VFR BLD AUTO: 36.8 % (ref 35–47)
HGB BLD-MCNC: 12.8 G/DL (ref 12–16)
MCH RBC QN AUTO: 33.3 PG (ref 27–34)
MCHC RBC AUTO-ENTMCNC: 34.7 G/DL (ref 32–36)
MCV RBC AUTO: 96 FL (ref 80–100)
PLATELET # BLD AUTO: 154 THOU/UL (ref 140–440)
PMV BLD AUTO: 8.5 FL (ref 7–10)
RBC # BLD AUTO: 3.84 MILL/UL (ref 3.8–5.4)
WBC: 9.5 THOU/UL (ref 4–11)

## 2020-08-11 ENCOUNTER — COMMUNICATION - HEALTHEAST (OUTPATIENT)
Dept: RHEUMATOLOGY | Facility: CLINIC | Age: 85
End: 2020-08-11

## 2020-08-11 DIAGNOSIS — M06.4 INFLAMMATORY POLYARTHRITIS (H): ICD-10-CM

## 2020-08-17 ENCOUNTER — COMMUNICATION - HEALTHEAST (OUTPATIENT)
Dept: RHEUMATOLOGY | Facility: CLINIC | Age: 85
End: 2020-08-17

## 2020-08-17 DIAGNOSIS — M06.4 INFLAMMATORY POLYARTHRITIS (H): ICD-10-CM

## 2020-09-08 ENCOUNTER — AMBULATORY - HEALTHEAST (OUTPATIENT)
Dept: LAB | Facility: CLINIC | Age: 85
End: 2020-09-08

## 2020-09-08 DIAGNOSIS — M06.4 INFLAMMATORY POLYARTHRITIS (H): ICD-10-CM

## 2020-09-08 LAB
ALBUMIN SERPL-MCNC: 3.8 G/DL (ref 3.5–5)
ALT SERPL W P-5'-P-CCNC: 19 U/L (ref 0–45)
CREAT SERPL-MCNC: 1.28 MG/DL (ref 0.6–1.1)
ERYTHROCYTE [DISTWIDTH] IN BLOOD BY AUTOMATED COUNT: 11.1 % (ref 11–14.5)
GFR SERPL CREATININE-BSD FRML MDRD: 39 ML/MIN/1.73M2
HCT VFR BLD AUTO: 38.1 % (ref 35–47)
HGB BLD-MCNC: 12.7 G/DL (ref 12–16)
MCH RBC QN AUTO: 32.4 PG (ref 27–34)
MCHC RBC AUTO-ENTMCNC: 33.3 G/DL (ref 32–36)
MCV RBC AUTO: 97 FL (ref 80–100)
PLATELET # BLD AUTO: 168 THOU/UL (ref 140–440)
PMV BLD AUTO: 8.6 FL (ref 7–10)
RBC # BLD AUTO: 3.91 MILL/UL (ref 3.8–5.4)
WBC: 8.8 THOU/UL (ref 4–11)

## 2020-09-10 ENCOUNTER — OFFICE VISIT - HEALTHEAST (OUTPATIENT)
Dept: RHEUMATOLOGY | Facility: CLINIC | Age: 85
End: 2020-09-10

## 2020-09-10 DIAGNOSIS — N18.30 STAGE 3 CHRONIC KIDNEY DISEASE (H): ICD-10-CM

## 2020-09-10 DIAGNOSIS — M11.20 CHONDROCALCINOSIS: ICD-10-CM

## 2020-09-10 DIAGNOSIS — K51.011 ULCERATIVE PANCOLITIS WITH RECTAL BLEEDING (H): ICD-10-CM

## 2020-09-10 DIAGNOSIS — M15.0 PRIMARY OSTEOARTHRITIS INVOLVING MULTIPLE JOINTS: ICD-10-CM

## 2020-09-10 DIAGNOSIS — M06.4 INFLAMMATORY POLYARTHRITIS (H): ICD-10-CM

## 2020-09-15 ENCOUNTER — RECORDS - HEALTHEAST (OUTPATIENT)
Dept: LAB | Facility: CLINIC | Age: 85
End: 2020-09-15

## 2020-09-15 LAB
ALBUMIN SERPL-MCNC: 3.9 G/DL (ref 3.5–5)
ALP SERPL-CCNC: 101 U/L (ref 45–120)
ALT SERPL W P-5'-P-CCNC: 17 U/L (ref 0–45)
ANION GAP SERPL CALCULATED.3IONS-SCNC: 14 MMOL/L (ref 5–18)
AST SERPL W P-5'-P-CCNC: 23 U/L (ref 0–40)
BILIRUB SERPL-MCNC: 0.3 MG/DL (ref 0–1)
BUN SERPL-MCNC: 57 MG/DL (ref 8–28)
CALCIUM SERPL-MCNC: 9.6 MG/DL (ref 8.5–10.5)
CHLORIDE BLD-SCNC: 106 MMOL/L (ref 98–107)
CHOLEST SERPL-MCNC: 152 MG/DL
CO2 SERPL-SCNC: 18 MMOL/L (ref 22–31)
CREAT SERPL-MCNC: 1.28 MG/DL (ref 0.6–1.1)
FASTING STATUS PATIENT QL REPORTED: ABNORMAL
GFR SERPL CREATININE-BSD FRML MDRD: 39 ML/MIN/1.73M2
GLUCOSE BLD-MCNC: 196 MG/DL (ref 70–125)
HDLC SERPL-MCNC: 52 MG/DL
LDLC SERPL CALC-MCNC: 44 MG/DL
POTASSIUM BLD-SCNC: 4.8 MMOL/L (ref 3.5–5)
PROT SERPL-MCNC: 7 G/DL (ref 6–8)
SODIUM SERPL-SCNC: 138 MMOL/L (ref 136–145)
TRIGL SERPL-MCNC: 282 MG/DL

## 2020-10-05 ENCOUNTER — COMMUNICATION - HEALTHEAST (OUTPATIENT)
Dept: RHEUMATOLOGY | Facility: CLINIC | Age: 85
End: 2020-10-05

## 2020-10-05 DIAGNOSIS — M06.4 INFLAMMATORY POLYARTHRITIS (H): ICD-10-CM

## 2020-10-08 ENCOUNTER — COMMUNICATION - HEALTHEAST (OUTPATIENT)
Dept: RHEUMATOLOGY | Facility: CLINIC | Age: 85
End: 2020-10-08

## 2020-10-08 DIAGNOSIS — M06.4 INFLAMMATORY POLYARTHRITIS (H): ICD-10-CM

## 2020-10-08 RX ORDER — SULFASALAZINE 500 MG/1
1000 TABLET ORAL 2 TIMES DAILY
Qty: 360 TABLET | Refills: 0 | Status: SHIPPED | OUTPATIENT
Start: 2020-10-08

## 2020-12-10 ENCOUNTER — RECORDS - HEALTHEAST (OUTPATIENT)
Dept: LAB | Facility: CLINIC | Age: 85
End: 2020-12-10

## 2020-12-12 LAB — BACTERIA SPEC CULT: ABNORMAL

## 2020-12-29 ENCOUNTER — COMMUNICATION - HEALTHEAST (OUTPATIENT)
Dept: RHEUMATOLOGY | Facility: CLINIC | Age: 85
End: 2020-12-29

## 2020-12-29 DIAGNOSIS — M06.4 INFLAMMATORY POLYARTHRITIS (H): ICD-10-CM

## 2020-12-31 ENCOUNTER — COMMUNICATION - HEALTHEAST (OUTPATIENT)
Dept: ADMINISTRATIVE | Facility: CLINIC | Age: 85
End: 2020-12-31

## 2021-01-06 ENCOUNTER — RECORDS - HEALTHEAST (OUTPATIENT)
Dept: LAB | Facility: CLINIC | Age: 86
End: 2021-01-06

## 2021-01-07 LAB
ALBUMIN SERPL-MCNC: 4.1 G/DL (ref 3.5–5)
ALP SERPL-CCNC: 97 U/L (ref 45–120)
ALT SERPL W P-5'-P-CCNC: 24 U/L (ref 0–45)
ANION GAP SERPL CALCULATED.3IONS-SCNC: 11 MMOL/L (ref 5–18)
AST SERPL W P-5'-P-CCNC: 27 U/L (ref 0–40)
BILIRUB SERPL-MCNC: 0.3 MG/DL (ref 0–1)
BUN SERPL-MCNC: 74 MG/DL (ref 8–28)
CALCIUM SERPL-MCNC: 9.5 MG/DL (ref 8.5–10.5)
CHLORIDE BLD-SCNC: 106 MMOL/L (ref 98–107)
CO2 SERPL-SCNC: 18 MMOL/L (ref 22–31)
CREAT SERPL-MCNC: 1.27 MG/DL (ref 0.6–1.1)
GFR SERPL CREATININE-BSD FRML MDRD: 40 ML/MIN/1.73M2
GLUCOSE BLD-MCNC: 146 MG/DL (ref 70–125)
POTASSIUM BLD-SCNC: 5 MMOL/L (ref 3.5–5)
PROT SERPL-MCNC: 7.3 G/DL (ref 6–8)
SODIUM SERPL-SCNC: 135 MMOL/L (ref 136–145)

## 2021-05-25 ENCOUNTER — RECORDS - HEALTHEAST (OUTPATIENT)
Dept: ADMINISTRATIVE | Facility: CLINIC | Age: 86
End: 2021-05-25

## 2021-05-26 ENCOUNTER — RECORDS - HEALTHEAST (OUTPATIENT)
Dept: ADMINISTRATIVE | Facility: CLINIC | Age: 86
End: 2021-05-26

## 2021-05-27 ENCOUNTER — RECORDS - HEALTHEAST (OUTPATIENT)
Dept: ADMINISTRATIVE | Facility: CLINIC | Age: 86
End: 2021-05-27

## 2021-05-28 ENCOUNTER — RECORDS - HEALTHEAST (OUTPATIENT)
Dept: ADMINISTRATIVE | Facility: CLINIC | Age: 86
End: 2021-05-28

## 2021-05-28 NOTE — PROGRESS NOTES
BMP ordered.  -[facility]      Notes recorded by Maura Kaiser NP on 5/8/2019 at 7:42 AM CDT  Yvonne Kumari,  Please let Lisa (daughter) know her mom's BMP is stable except mildly elevated BUN. Because her HF symptoms has improved, will recommend to stay on current dose of Spironolactone 12.5 mg daily and maintain adequate hydration (atleast 48-50 ounces of fluid/day). Would recommend BMP in 1-2 weeks. She will need another BMP in about a month and then periodically there after. Have Lisa contact me if further questions  Thank you  CY

## 2021-05-28 NOTE — TELEPHONE ENCOUNTER
Pt states she went down to prednisone 7.5mg for 30 days then reduced to 5mg daily on 4/15/19 and her arthritis symptoms returned, mainly arm/shoulder pain. Pt increased her dose back to 7.5mg daily and is feeling better. She would like to stay on this dose until she sees Dr Braden on 6/18/19. I explained I would let him know and call her back if he wants her to do anything different before her appt.

## 2021-05-28 NOTE — TELEPHONE ENCOUNTER
Dr. Braden notified that pt increased prednisone back to 7.5mg due to increased symptoms. Dr Braden said this was fine but he would like to see her sooner then to discuss her symptoms and other med options. Pt notified and rescheduled to 5/29 to see Dr Braden.

## 2021-05-28 NOTE — PATIENT INSTRUCTIONS - HE
Ani Castillo,    It was a pleasure to see you today at the [facility].     My recommendations after this visit include:    - No medications changes made today     - I did some lab work today and will update results when available     - Stay on low sodium diet < 2000 mg/day, monitor daily weight (bring weight log book for each visit) and stay physically active as tolerated    -Please call if you experience rapid weight gain 2-3 lbs two days in a row or 5 lbs in a week with worsening shortness of breath, lightheaded, dizziness, abdominal bloating, and leg swelling     - Follow up in [facility] Failure Clinic as needed    - Please call Yvonne Kumari RN on [phone], if you have any questions or concerns    Maura Kaiser, CNP

## 2021-05-29 ENCOUNTER — RECORDS - HEALTHEAST (OUTPATIENT)
Dept: ADMINISTRATIVE | Facility: CLINIC | Age: 86
End: 2021-05-29

## 2021-05-29 NOTE — PROGRESS NOTES
"ASSESSMENT AND PLAN:  Ani Castillo 86 y.o. female is seen here on 05/29/19 for follow-up of inflammatory polyarthritis, osteoarthritis in the background of inflammatory bowel disease, for which she has had colectomy now has an ileostomy.  On her previous visit she was on prednisone as she tapered she has flareup of joint pain especially in her shoulders.  Today we had a detailed discussion around why it is important now that she chooses the plan to take \"DMARD\" instead of continuing prednisone at the current dose of 7-1/2 mg.  After some reflection she is prepared to go that down route.  Given her literature on sulfasalazine.  Starting initially at a low dose and then a gram twice daily.  Check labs today and in 4 weeks thereafter x2 and will follow-up here in 2 months.  During this time she will stay on 7-1/2 mg of prednisone which controls her symptoms very well.         Diagnoses and all orders for this visit:    Inflammatory polyarthritis (H)  -     HM1(CBC and Differential)  -     ALT (SGPT)  -     Creatinine  -     Erythrocyte Sedimentation Rate  -     C-Reactive Protein  -     Uric Acid  -     HM1 (CBC with Diff)  -     sulfaSALAzine (AZULFIDINE) 500 mg tablet; Take 1 tablet (500 mg total) by mouth 2 (two) times a day for 15 days, THEN 2 tablets (1,000 mg total) 2 (two) times a day.  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  She has inflammatory arthropathy.  This is in association with Crohn's.  She has negative WILLIAMS anti-CCP antibody rheumatoid factor.  She has osteoarthritis.  She noted near complete resolution with prednisone.  As she began to taper the prednisone and came down to 5 mg she started hurting.  This was in her shoulders bilaterally.  It radiated even to the left thoracic area.  As she took back to 7.5 mg daily within a short while in [place] 2 or 3 days her symptoms responded.  Now she is taking the same dose and noted a very little pain at 0.5/10 able to do most of her day-to-day activities without any or with some difficulty.  Her morning stiffness is limited to 5 minutes.  She is accompanied by her son, Jorge.  She has chondrocalcinosis.   As she reports that she has had 3 knee surgeries.  She had what sounds like hemiarthroplasty of each knee and then a total replacement of the left knee.  She uses a brace for the right knee.  No other joint areas are similarly affected.  When she was having this acute phase she did not have any significant thigh, hip area or low back pain.  She did not have jaw claudication double vision or headaches.  There is no family history of psoriasis, rheumatoid arthritis.  Further historical information and ADL limitations as noted in the multidimensional health assessment questionnaire attached in the EMR.       Following is the excerpt from a recent note:   follow-up of painful joints affecting her upper extremities.  On her previous visit she was on 20 mg of prednisone.  With this her joint symptoms had disappeared.  She had been tapering as instructed and was down to 5 mg.  During this time she did not have recurrence of joint pains.  She however developed flareup of Crohn's with rectal bleed.  Her gastroenterologist and give her 40 mg of prednisone.  She gets joint pains toward the end of the day with activity.  She has no pain in the morning.  There is no stiffness.   She has not had any fever or weight loss blurry vision rash or mouth also.  She reports long-standing history of Crohn's.  She was on Remicade.  Prior to that she has had a joint replacement such as in the knees as noted later and had evidence of osteoarthritis.  However as she changed from Remicade to her current infusion for Crohn's all of a sudden she started developing pain.  This was severe.  This affected her upper extremities.  She had swelling of the hands.  She reached a point where she could not use her upper extremities she felt as if she was paralyzed.  She was given prednisone 20 mg by her gastroenterologist.  Within a matter of less than a day her symptoms resolved back to normal self.  She is not today.  She loves prednisone because of this.  She cannot remember such episode happening with her joints in the past.  She holliday however had taken prednisone in the past for Crohn's.ALLERGIES:Metformin    PAST MEDICAL/ACTIVE PROBLEMS/MEDICATION/ FAMILY HISTORY/SOCIAL DATA:  The patient has a family history of  Past Medical History:   Diagnosis Date     Abnormal CT of the head 12/3/2018     Atrial fibrillation (H)      Benign essential hypertension     Created by Conversion      Cardiomyopathy (H)      CHF (congestive heart failure) (H)      Chronic kidney disease     ckd     Chronic Kidney Disease (NKF Classification)     Created by Conversion      Colitis      Diabetes mellitus (H)      Diastolic Congestive Heart Failure     Created by Conversion  Replacement Utility updated for latest IMO load     DM (diabetes mellitus) (H)     Created by Conversion      GI bleed 10/18/2016     Gout      Hyperlipidemia      Hyperlipidemia     Created by Conversion      Hypertension      Morbid obesity (H)      MALISSA (obstructive sleep apnea)      Persistent atrial fibrillation (H)      [id number] score of 6 and on chronic warfarin Cardioversion 11/10,  7/11asymptomatic and on rate control       PRB (rectal bleeding)       Shortness of breath     Created by Conversion      Sleep apnea     uses a CPAP     SOB (shortness of breath)      Urinary incontinence      Urinary tract infection 12/1/2018     Social History     Tobacco Use   Smoking Status Never Smoker   Smokeless Tobacco Never Used     Patient Active Problem List   Diagnosis     DM (diabetes mellitus) (H)     Chronic Kidney Disease (NKF Classification)     Benign essential hypertension     Chronic diastolic CHF (congestive heart failure) (H)     Sleep apnea     Shortness of breath     Hyperlipidemia     Anemia due to blood loss, acute     Polyarthralgia     Primary osteoarthritis involving multiple joints     Inflammatory polyarthritis (H)     Chondrocalcinosis     Insulin dependent diabetes mellitus with complications (H)     Coronary artery disease involving native coronary artery without angina pectoris     Ulcerative colitis (H)     Hyperglycemia     Chronic ulcerative colitis with rectal bleeding (H)     Bloody diarrhea     Chronic atrial fibrillation (H)     Diabetes mellitus, type II, insulin dependent (H)     History of coronary artery disease     Hypomagnesemia     Hyperkalemia     Cognitive and behavioral changes     Adjustment disorder with anxious mood     Current Outpatient Medications   Medication Sig Dispense Refill     acetaminophen (ACETAMINOPHEN EXTRA STRENGTH) 500 MG tablet Take 1,000 mg by mouth 2 (two) times a day as needed.        aspirin 81 mg chewable tablet Chew 81 mg daily.       blood sugar diagnostic (GLUCOSE BLOOD) Strp Use As Directed. OneTouch Ultra Blue In Vitro Strip.       escitalopram oxalate (LEXAPRO) 10 MG tablet Take 10 mg by mouth daily.       ferrous sulfate 325 (65 FE) MG tablet Take 1 tablet by mouth daily with breakfast.       insulin lispro (HUMALOG KWIKPEN) 100 unit/mL pen Inject 4 Units under the skin 3 (three) times a day with meals Hold dose if you don't eat. 5 adj dose pen prn     LACTOBACILLUS BIFIDUS ORAL Take 1 tablet by mouth  "daily.       LANTUS SOLOSTAR U-100 INSULIN 100 unit/mL (3 mL) pen Inject 30 Units under the skin daily with breakfast. (Patient taking differently: Inject 28 Units under the skin daily with breakfast.       ) 5 adj dose pen PRN     magnesium oxide (MAG-OX) 400 mg (241.3 mg magnesium) tablet Take 400 mg by mouth daily.       predniSONE (DELTASONE) 2.5 MG tablet 7.5 mg/d prednisone PO for 1 month, reduce to 5 mg/d for the next month, and then reduce to 2.5 mg/d for the third month and then stop.. (Patient taking differently: 7.5 mg/d prednisone PO Daily.) 90 tablet 2     simvastatin (ZOCOR) 20 MG tablet Take 20 mg by mouth every morning .             spironolactone (ALDACTONE) 25 MG tablet Take 0.5 tablets (12.5 mg total) by mouth daily. 30 tablet 5     No current facility-administered medications for this visit.        DETAILED EXAMINATION  05/29/19  :  Vitals:    05/29/19 1510   BP: 116/76   Pulse: (!) 56   Weight: (!) 227 lb 1.6 oz (103 kg)   Height: 5' 2\" (1.575 m)     Alert oriented. Head including the face is examined for malar rash, heliotropes, scarring, lupus pernio. Eyes examined for redness such as in episcleritis/scleritis, periorbital lesions.   Neck/ Face examined for parotid gland swelling, range of motion of neck.  Left upper and lower and right upper and lower extremities examined for tenderness, swelling, warmth of the appendicular joints, range of motion, edema, rash.  Some of the important findings included: She is able to fully abduct both her shoulders.  She has marked Heberden's with deformity but no synovitis anymore and palpable upper extremity joints.  Bilateral TKA.  Squaring of the first CMC's.    LAB / IMAGING DATA:  ALT   Date Value Ref Range Status   12/01/2018 10 0 - 45 U/L Final   11/27/2018 12 0 - 45 U/L Final   11/05/2018 <9 0 - 45 U/L Final     Albumin   Date Value Ref Range Status   03/29/2019 3.8 3.5 - 5.0 g/dL Final   01/11/2019 3.4 (L) 3.5 - 5.0 g/dL Final   12/13/2018 3.5 3.5 - " 5.0 g/dL Final     Creatinine   Date Value Ref Range Status   05/07/2019 1.01 0.60 - 1.10 mg/dL Final   03/29/2019 1.02 0.60 - 1.10 mg/dL Final   01/11/2019 1.30 (H) 0.60 - 1.10 mg/dL Final       WBC   Date Value Ref Range Status   12/06/2018 9.2 4.0 - 11.0 thou/uL Final   12/04/2018 9.1 4.0 - 11.0 thou/uL Final     Hemoglobin   Date Value Ref Range Status   12/06/2018 11.0 (L) 12.0 - 16.0 g/dL Final   12/04/2018 11.5 (L) 12.0 - 16.0 g/dL Final   12/03/2018 11.2 (L) 12.0 - 16.0 g/dL Final     Platelets   Date Value Ref Range Status   12/06/2018 132 (L) 140 - 440 thou/uL Final   12/05/2018 153 140 - 440 thou/uL Final   12/04/2018 159 140 - 440 thou/uL Final       Lab Results   Component Value Date    RF <15.0 10/10/2017    SEDRATE 107 (H) 08/23/2018

## 2021-05-30 VITALS — HEIGHT: 62 IN | WEIGHT: 201 LBS | BODY MASS INDEX: 36.99 KG/M2

## 2021-05-31 ENCOUNTER — RECORDS - HEALTHEAST (OUTPATIENT)
Dept: ADMINISTRATIVE | Facility: CLINIC | Age: 86
End: 2021-05-31

## 2021-05-31 VITALS — BODY MASS INDEX: 40.3 KG/M2 | WEIGHT: 219 LBS | HEIGHT: 62 IN

## 2021-05-31 VITALS — WEIGHT: 223 LBS | HEIGHT: 62 IN | BODY MASS INDEX: 41.04 KG/M2

## 2021-05-31 VITALS — HEIGHT: 62 IN | WEIGHT: 222.4 LBS | BODY MASS INDEX: 40.93 KG/M2

## 2021-05-31 NOTE — PROGRESS NOTES
"OUTPATIENT OSTOMY ASSESSMENT    Patients mode of arrival: Ambulatory assisted    INTAKE  Type of Stoma: Permanent Ileostomy  Anticipated date of takedown: NA    Diagnosis Pertinent to Stoma:Bowel obstruction Date of Diagnosis: 10/2018  Type of Surgery: Ileostomy    Surgery Date: 10/2018  Surgeon: Nolberto     [facility]: [facility]    Purpose of this visit:Peristomal Complications    Present for Teaching Session: Patient and Family Member   Present: NA    Pertinent Information: Patient and daughter report peristomal skin irritation    Current Equipment:    Product # Brand Description   Pouch [address] [place] 1-3/8\" convex   Flange      Paste 33752 Coloplast 1-3/8\" protective seal   Powder [address] [place] Stoma powder   Deodorizer                    Pouch Change Frequency: twice weekly  Provider of Care: Emptying: self Pouch Change: home care LPN    ASSESSMENT  Stoma Size: Oval 1-1/4x1-3/8 inches  Protrusion: 3cm  Vascularity: Pink  Mucocutaneous Juncture: Intact  Peristomal Skin: pinpoint erythema underneath where paper tape border lays on pouch. Patient reports that this gets better and worse    Wound: No  Current Wound Care: NA    TREATMENT  Applied: Cavilon no-sting skin barrier    INTERVENTIONS  Refitting done, Educated on peristomal skin treatment and Educated on pouch change procedure  Miscellaneous Interventions: NA    INSTRUCTIONS GIVEN  WOC Role, Anatomy and Pouching Products: Showed pouching system     PLAN  Change in Supplies: See Outpatient Ostomy Instructions      Total Time Spent with Patient: 35 minutes.  Education time: 15 minutes.  Wound care: 0 minutes.    "

## 2021-05-31 NOTE — PATIENT INSTRUCTIONS - HE
"OUTPATIENT OSTOMY INSTRUCTIONS    Type of Stoma: Ileostomy    Supplier: [employer] [facility] [phone]    Equipment:   Product # Brand Description   Pouch [address] [place] 1-3/8\" convex   Flange      Paste 18666 Coloplast 1-3/8\" protective seal   Powder      Deodorizer      Skin barrier 3344 3M Cavilon no-sting   Barrier strips 45433 Coloplast XL barrier strips     Procedure:  Close the bottom of the pouch.  If needed cut the opening of your pouch to the correct size, cut off paper tape border from new pouch, and then remove backings from adhesive surfaces.  Remove the soiled pouch and discard.  Wash skin with water and dry.  Then apply Cavilon no-sting skin barrier and allow to dry  Apply ring: Around stoma  Then: Apply pouching system to stoma site and hold in place for 2-5 minutes.  Apply barrier strips around pouch      If there is any yeast-like irritation to surrounding skin, ok to use an antifungal powder (like nystatin) to surrounding skin before applying skin barrier      Pouch change: twice weekly  Wound Care (not under pouching system): NA  _____________________________________________________________________    [facility] Nurse Specialist: Saskia Pinzon RN CWOCN   Questions: [phone]      I have received a copy of these instructions, have had an opportunity to ask questions, and have had them answered to my satisfaction.    ________________________________________________________________  Patient Signature and Date    Follow-up Appointment: 1 year  To schedule an appointment call [facility] Central Scheduling at [phone]     "

## 2021-05-31 NOTE — PROGRESS NOTES
ASSESSMENT AND PLAN:  Ani Castillo 86 y.o. female is seen here on 07/31/19 for follow-up of inflammatory polyarthritis, osteoarthritis in the background of inflammatory bowel disease, for which she has had colectomy now has an ileostomy.  She is accompanied by her daughter.  She does not have residual synovitis, she has widespread OA changes such as in her hands, she noted pain to be virtually 0/10, had some nausea with night dose of sulfasalazine, she is going to reduce prednisone down to 5 mg and then 2.5 mg over the next 60 days and stop.  Will meet here in 3 months labs today, 4 weeks and just prior to her next visit.     Diagnoses and all orders for this visit:    Inflammatory polyarthritis (H)  -     sulfaSALAzine (AZULFIDINE) 500 mg tablet; Take 2 tablets (1,000 mg total) by mouth 2 (two) times a day.  Dispense: 360 tablet; Refill: 0  -     predniSONE (DELTASONE) 2.5 MG tablet; 5 mg daily for 30 days, reduce to 2.5 mg or 30 days and then stop..  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  She has inflammatory arthropathy.  This is in association with ulcerative colitis.  She has negative WILLIAMS anti-CCP antibody rheumatoid factor.  She has osteoarthritis.  She is on a gram twice daily of sulfasalazine, 7.5 mg of prednisone, she has noted pain level to be virtually 0 some discomfort in her right hand, able to do most of her day-to-day activities without any or with some difficulty, she is noted morning stiffness to 30 minutes to an hour.  She noted the some nausea with the evening dose of sulfasalazine.  She has however persevered.  She is on 7.5 mg prednisone.  She is due for lab today.  Most recent labs done a month ago work  within acceptable range.    She has chondrocalcinosis.   As she reports that she has had 3 knee surgeries.  She had what sounds like hemiarthroplasty of each knee and then a total replacement of the left knee.  She uses a brace for the right knee.  No other joint areas are similarly affected.  When she was having this acute phase she did not have any significant thigh, hip area or low back pain.  She did not have jaw claudication double vision or headaches.  There is no family history of psoriasis, rheumatoid arthritis.  Further historical information and ADL limitations as noted in the multidimensional health assessment questionnaire attached in the EMR.       Following is the excerpt from a recent note:   follow-up of painful joints affecting her upper extremities.  On her previous visit she was on 20 mg of prednisone.  With this her joint symptoms had disappeared.  She had been tapering as instructed and was down to 5 mg.  During this time she did not have recurrence of joint pains.  She however developed flareup of Crohn's with rectal bleed.  Her gastroenterologist and give her 40 mg of prednisone.  She gets joint pains toward the end of the day with activity.  She has no pain in the morning.  There is no stiffness.  She has not had any fever or weight loss blurry vision rash or mouth also.  She reports long-standing history of Crohn's.  She was on Remicade.  Prior to that she has had a joint replacement such as in the knees as noted later and had evidence of osteoarthritis.  However as she changed from Remicade to her current infusion for Crohn's all of a sudden she started developing pain.  This was severe.  This affected her upper extremities.  She had swelling of the hands.  She reached a point where she could not use her upper extremities she felt as if she was paralyzed.  She was given prednisone 20 mg by her gastroenterologist.  Within a matter of less than a day her symptoms resolved back to normal self.   She is not today.  She loves prednisone because of this.  She cannot remember such episode happening with her joints in the past.  She holliday however had taken prednisone in the past for Crohn's.ALLERGIES:Metformin    PAST MEDICAL/ACTIVE PROBLEMS/MEDICATION/ FAMILY HISTORY/SOCIAL DATA:  The patient has a family history of  Past Medical History:   Diagnosis Date     Abnormal CT of the head 12/3/2018     Atrial fibrillation (H)      Benign essential hypertension     Created by Conversion      Cardiomyopathy (H)      CHF (congestive heart failure) (H)      Chronic kidney disease     ckd     Chronic Kidney Disease (NKF Classification)     Created by Conversion      Colitis      Diabetes mellitus (H)      Diastolic Congestive Heart Failure     Created by Conversion  Replacement Utility updated for latest IMO load     DM (diabetes mellitus) (H)     Created by Conversion      GI bleed 10/18/2016     Gout      Hyperlipidemia      Hyperlipidemia     Created by Conversion      Hypertension      Morbid obesity (H)      MALISSA (obstructive sleep apnea)      Persistent atrial fibrillation (H)      [id number] score of 6 and on chronic warfarin Cardioversion 11/10,  7/11asymptomatic and on rate control       PRB (rectal bleeding)      Shortness of breath     Created by Conversion      Sleep apnea     uses a CPAP     SOB (shortness of breath)      Urinary incontinence      Urinary tract infection 12/1/2018     Social History     Tobacco Use   Smoking Status Never Smoker   Smokeless Tobacco Never Used     Patient Active Problem List   Diagnosis     DM (diabetes mellitus) (H)     Chronic Kidney Disease (NKF Classification)     Benign essential hypertension     Chronic diastolic CHF (congestive heart failure) (H)     Sleep apnea     Shortness of breath     Hyperlipidemia     Anemia due to blood loss, acute     Polyarthralgia     Primary osteoarthritis involving multiple joints     Inflammatory polyarthritis (H)     Chondrocalcinosis      Insulin dependent diabetes mellitus with complications (H)     Coronary artery disease involving native coronary artery without angina pectoris     Ulcerative colitis (H)     Hyperglycemia     Chronic ulcerative colitis with rectal bleeding (H)     Bloody diarrhea     Chronic atrial fibrillation (H)     Diabetes mellitus, type II, insulin dependent (H)     History of coronary artery disease     Hypomagnesemia     Hyperkalemia     Cognitive and behavioral changes     Adjustment disorder with anxious mood     Current Outpatient Medications   Medication Sig Dispense Refill     acetaminophen (ACETAMINOPHEN EXTRA STRENGTH) 500 MG tablet Take 1,000 mg by mouth 2 (two) times a day as needed.        aspirin 81 mg chewable tablet Chew 81 mg daily.       blood sugar diagnostic (GLUCOSE BLOOD) Strp Use As Directed. OneTouch Ultra Blue In Vitro Strip.       escitalopram oxalate (LEXAPRO) 10 MG tablet Take 10 mg by mouth daily.       ferrous sulfate 325 (65 FE) MG tablet Take 1 tablet by mouth daily with breakfast.       insulin lispro (HUMALOG KWIKPEN) 100 unit/mL pen Inject 4 Units under the skin 3 (three) times a day with meals Hold dose if you don't eat. 5 adj dose pen prn     LACTOBACILLUS BIFIDUS ORAL Take 1 tablet by mouth daily.       LANTUS SOLOSTAR U-100 INSULIN 100 unit/mL (3 mL) pen Inject 30 Units under the skin daily with breakfast. (Patient taking differently: Inject 28 Units under the skin daily with breakfast.       ) 5 adj dose pen PRN     magnesium oxide (MAG-OX) 400 mg (241.3 mg magnesium) tablet Take 400 mg by mouth daily.       predniSONE (DELTASONE) 2.5 MG tablet Take 7.5 mg by mouth daily. 90 tablet 1     simvastatin (ZOCOR) 20 MG tablet Take 20 mg by mouth every morning .             spironolactone (ALDACTONE) 25 MG tablet Take 0.5 tablets (12.5 mg total) by mouth daily. 30 tablet 5     sulfaSALAzine (AZULFIDINE) 500 mg tablet Take 1 tablet (500 mg total) by mouth 2 (two) times a day for 15 days, THEN 2  tablets (1,000 mg total) 2 (two) times a day. 210 tablet 0     No current facility-administered medications for this visit.        DETAILED EXAMINATION  07/31/19  :  Vitals:    07/31/19 1127   BP: 104/54   Patient Site: Right Arm   Patient Position: Sitting   Cuff Size: Adult Large   Pulse: 68   Weight: (!) 227 lb (103 kg)     Alert oriented. Head including the face is examined for malar rash, heliotropes, scarring, lupus pernio. Eyes examined for redness such as in episcleritis/scleritis, periorbital lesions.   Neck/ Face examined for parotid gland swelling, range of motion of neck.  Left upper and lower and right upper and lower extremities examined for tenderness, swelling, warmth of the appendicular joints, range of motion, edema, rash.  Some of the important findings included: She is able to fully abduct both her shoulders.  She has marked Heberden's with deformity without synovitis in the palpable upper extremity joints.  Bilateral TKA.  Squaring of the first CMC's.    LAB / IMAGING DATA:  ALT   Date Value Ref Range Status   06/26/2019 18 0 - 45 U/L Final   05/29/2019 15 0 - 45 U/L Final   12/01/2018 10 0 - 45 U/L Final     Albumin   Date Value Ref Range Status   06/26/2019 3.8 3.5 - 5.0 g/dL Final   03/29/2019 3.8 3.5 - 5.0 g/dL Final   01/11/2019 3.4 (L) 3.5 - 5.0 g/dL Final     Creatinine   Date Value Ref Range Status   06/26/2019 1.09 0.60 - 1.10 mg/dL Final   05/29/2019 1.18 (H) 0.60 - 1.10 mg/dL Final   05/07/2019 1.01 0.60 - 1.10 mg/dL Final       WBC   Date Value Ref Range Status   06/26/2019 9.3 4.0 - 11.0 thou/uL Final   05/29/2019 11.1 (H) 4.0 - 11.0 thou/uL Final     Hemoglobin   Date Value Ref Range Status   06/26/2019 13.5 12.0 - 16.0 g/dL Final   05/29/2019 13.1 12.0 - 16.0 g/dL Final   12/06/2018 11.0 (L) 12.0 - 16.0 g/dL Final     Platelets   Date Value Ref Range Status   06/26/2019 166 140 - 440 thou/uL Final   05/29/2019  140 - 440 thou/uL Final     Comment:     Clumped Platelets; estimate  from smear appears to be normal   12/06/2018 132 (L) 140 - 440 thou/uL Final       Lab Results   Component Value Date    RF <15.0 10/10/2017    SEDRATE 9 05/29/2019

## 2021-06-01 ENCOUNTER — RECORDS - HEALTHEAST (OUTPATIENT)
Dept: ADMINISTRATIVE | Facility: CLINIC | Age: 86
End: 2021-06-01

## 2021-06-01 VITALS — HEIGHT: 62 IN | BODY MASS INDEX: 39.56 KG/M2 | WEIGHT: 215 LBS

## 2021-06-01 VITALS — WEIGHT: 224 LBS | HEIGHT: 62 IN | BODY MASS INDEX: 41.22 KG/M2

## 2021-06-01 VITALS — WEIGHT: 219.1 LBS | BODY MASS INDEX: 40.07 KG/M2

## 2021-06-02 ENCOUNTER — RECORDS - HEALTHEAST (OUTPATIENT)
Dept: ADMINISTRATIVE | Facility: CLINIC | Age: 86
End: 2021-06-02

## 2021-06-02 VITALS — WEIGHT: 193.5 LBS | BODY MASS INDEX: 35.39 KG/M2

## 2021-06-02 VITALS — WEIGHT: 189 LBS | BODY MASS INDEX: 34.57 KG/M2

## 2021-06-02 VITALS — BODY MASS INDEX: 35.3 KG/M2 | WEIGHT: 193 LBS

## 2021-06-02 VITALS — HEIGHT: 62 IN | WEIGHT: 191.2 LBS | BODY MASS INDEX: 35.19 KG/M2

## 2021-06-02 VITALS — HEIGHT: 62 IN | WEIGHT: 189.5 LBS | BODY MASS INDEX: 34.87 KG/M2

## 2021-06-02 VITALS — WEIGHT: 193 LBS | BODY MASS INDEX: 35.3 KG/M2

## 2021-06-02 VITALS — WEIGHT: 184 LBS | BODY MASS INDEX: 33.65 KG/M2

## 2021-06-02 VITALS — BODY MASS INDEX: 34.57 KG/M2 | WEIGHT: 189 LBS

## 2021-06-02 NOTE — PROGRESS NOTES
"ASSESSMENT AND PLAN:  Ani Castillo 86 y.o. female is seen here on 10/30/19 for follow-up.  She has inflammatory polyarthritis, osteoarthritis, ulcerative colitis, she is on sulfasalazine, this is controlling her inflammation, she noted bilateral proximal upper extremity pain worse on the right side with certain activities has features suggestive of tendinopathy of rotator cuff, DJD of the glenohumeral joint and the other differential we discussed would be cervical spondylosis, radicular symptoms.  Management principles of shoulder etiologies were discussed.  She would like to defer.  She is no longer on prednisone.  She is to follow-up here in 3 months.          Diagnoses and all orders for this visit:    Inflammatory polyarthritis (H)  -     sulfaSALAzine (AZULFIDINE) 500 mg tablet; Take 2 tablets (1,000 mg total) by mouth 2 (two) times a day.  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  She has inflammatory arthropathy.  This is in association with ulcerative colitis.  She has negative WILLIAMS anti-CCP antibody rheumatoid factor.  She has osteoarthritis.  She has noted pain.  This is typically more when she is using her walker for ambulation, more so on her right upper extremity, she points to the shoulder and the mid forearm as a site of more discomfort although hands are also affected she rates this pain as \"severe\".  It does not wake her up from sleep.  She has not observed swelling.  She has been taking sulfasalazine regularly.  Her recent labs are reviewed, within acceptable range, renal impairment noted and discussed.  She is aware not to take nonsteroidals.  Morning stiffness 1 to 2 " hours.  She has not had fever weight loss blurry vision eye redness mouth also nausea cough or rash.  She has chondrocalcinosis.   As she reports that she has had 3 knee surgeries.  She had what sounds like hemiarthroplasty of each knee and then a total replacement of the left knee.  She uses a brace for the right knee.  No other joint areas are similarly affected.  When she was having this acute phase she did not have any significant thigh, hip area or low back pain.  She did not have jaw claudication double vision or headaches.  There is no family history of psoriasis, rheumatoid arthritis.  Further historical information and ADL limitations as noted in the multidimensional health assessment questionnaire attached in the EMR.       Following is the excerpt from a recent note:   follow-up of painful joints affecting her upper extremities.  On her previous visit she was on 20 mg of prednisone.  With this her joint symptoms had disappeared.  She had been tapering as instructed and was down to 5 mg.  During this time she did not have recurrence of joint pains.  She however developed flareup of Crohn's with rectal bleed.  Her gastroenterologist and give her 40 mg of prednisone.  She gets joint pains toward the end of the day with activity.  She has no pain in the morning.  There is no stiffness.  She has not had any fever or weight loss blurry vision rash or mouth also.  She reports long-standing history of Crohn's.  She was on Remicade.  Prior to that she has had a joint replacement such as in the knees as noted later and had evidence of osteoarthritis.  However as she changed from Remicade to her current infusion for Crohn's all of a sudden she started developing pain.  This was severe.  This affected her upper extremities.  She had swelling of the hands.  She reached a point where she could not use her upper extremities she felt as if she was paralyzed.  She was given prednisone 20 mg by her gastroenterologist.   Within a matter of less than a day her symptoms resolved back to normal self.  She is not today.  She loves prednisone because of this.  She cannot remember such episode happening with her joints in the past.  She holliday however had taken prednisone in the past for Crohn's.ALLERGIES:Metformin    PAST MEDICAL/ACTIVE PROBLEMS/MEDICATION/ FAMILY HISTORY/SOCIAL DATA:  The patient has a family history of  Past Medical History:   Diagnosis Date     Abnormal CT of the head 12/3/2018     Atrial fibrillation (H)      Benign essential hypertension     Created by Conversion      Cardiomyopathy (H)      CHF (congestive heart failure) (H)      Chronic kidney disease     ckd     Chronic Kidney Disease (NKF Classification)     Created by Conversion      Colitis      Diabetes mellitus (H)      Diastolic Congestive Heart Failure     Created by Conversion  Replacement Utility updated for latest IMO load     DM (diabetes mellitus) (H)     Created by Conversion      GI bleed 10/18/2016     Gout      Hyperlipidemia      Hyperlipidemia     Created by Conversion      Hypertension      Morbid obesity (H)      MALISSA (obstructive sleep apnea)      Persistent atrial fibrillation      [id number] score of 6 and on chronic warfarin Cardioversion 11/10,  7/11asymptomatic and on rate control       PRB (rectal bleeding)      Shortness of breath     Created by Conversion      Sleep apnea     uses a CPAP     SOB (shortness of breath)      Urinary incontinence      Urinary tract infection 12/1/2018     Social History     Tobacco Use   Smoking Status Never Smoker   Smokeless Tobacco Never Used     Patient Active Problem List   Diagnosis     DM (diabetes mellitus) (H)     Chronic Kidney Disease (NKF Classification)     Benign essential hypertension     Chronic diastolic CHF (congestive heart failure) (H)     Sleep apnea     Shortness of breath     Hyperlipidemia     Anemia due to blood loss, acute     Polyarthralgia     Primary osteoarthritis involving  multiple joints     Inflammatory polyarthritis (H)     Chondrocalcinosis     Insulin dependent diabetes mellitus with complications (H)     Coronary artery disease involving native coronary artery without angina pectoris     Ulcerative colitis (H)     Hyperglycemia     Chronic ulcerative colitis with rectal bleeding (H)     Bloody diarrhea     Chronic atrial fibrillation     Diabetes mellitus, type II, insulin dependent (H)     History of coronary artery disease     Hypomagnesemia     Hyperkalemia     Cognitive and behavioral changes     Adjustment disorder with anxious mood     Current Outpatient Medications   Medication Sig Dispense Refill     acetaminophen (ACETAMINOPHEN EXTRA STRENGTH) 500 MG tablet Take 1,000 mg by mouth 2 (two) times a day as needed.        aspirin 81 mg chewable tablet Chew 81 mg daily.       blood sugar diagnostic (GLUCOSE BLOOD) Strp Use As Directed. OneTouch Ultra Blue In Vitro Strip.       escitalopram oxalate (LEXAPRO) 10 MG tablet Take 10 mg by mouth daily.       ferrous sulfate 325 (65 FE) MG tablet Take 1 tablet by mouth daily with breakfast.       LACTOBACILLUS BIFIDUS ORAL Take 1 tablet by mouth daily.       LANTUS SOLOSTAR U-100 INSULIN 100 unit/mL (3 mL) pen Inject 30 Units under the skin daily with breakfast. (Patient taking differently: Inject 28 Units under the skin daily with breakfast.       ) 5 adj dose pen PRN     magnesium oxide (MAG-OX) 400 mg (241.3 mg magnesium) tablet Take 400 mg by mouth daily.       simvastatin (ZOCOR) 20 MG tablet Take 20 mg by mouth every morning .             spironolactone (ALDACTONE) 25 MG tablet Take 0.5 tablets (12.5 mg total) by mouth daily. 30 tablet 5     sulfaSALAzine (AZULFIDINE) 500 mg tablet TAKE TWO TABLETS BY MOUTH TWICE DAILY  240 tablet 0     insulin lispro (HUMALOG KWIKPEN) 100 unit/mL pen Inject 4 Units under the skin 3 (three) times a day with meals Hold dose if you don't eat. 5 adj dose pen prn     predniSONE (DELTASONE) 2.5 MG  "tablet 5 mg daily for 30 days, reduce to 2.5 mg or 30 days and then stop.. 60 tablet 1     No current facility-administered medications for this visit.        DETAILED EXAMINATION  10/30/19  :  Vitals:    10/30/19 1506   BP: 128/70   Patient Site: Right Arm   Patient Position: Sitting   Cuff Size: Adult Large   Weight: (!) 227 lb (103 kg)   Height: 5' 2\" (1.575 m)     Alert oriented. Head including the face is examined for malar rash, heliotropes, scarring, lupus pernio. Eyes examined for redness such as in episcleritis/scleritis, periorbital lesions.   Neck/ Face examined for parotid gland swelling, range of motion of neck.  Left upper and lower and right upper and lower extremities examined for tenderness, swelling, warmth of the appendicular joints, range of motion, edema, rash.  Some of the important findings included: There is no synovitis and palpable joints of upper extremities she has marked Heberden's with associated deformities she has remarkably intact range of motion of the shoulders, including internal rotation she has age-appropriate C-spine range of motion.  She was able to walk in the room with her walker without significant pain coming back in the upper extremities.    Bilateral TKA.  Squaring of the first CMC's.    LAB / IMAGING DATA:  ALT   Date Value Ref Range Status   10/25/2019 24 0 - 45 U/L Final   09/10/2019 21 0 - 45 U/L Final   08/28/2019 19 0 - 45 U/L Final     Albumin   Date Value Ref Range Status   10/25/2019 4.0 3.5 - 5.0 g/dL Final   09/10/2019 3.7 3.5 - 5.0 g/dL Final   08/28/2019 3.8 3.5 - 5.0 g/dL Final     Creatinine   Date Value Ref Range Status   10/25/2019 1.25 (H) 0.60 - 1.10 mg/dL Final   09/10/2019 1.08 0.60 - 1.10 mg/dL Final   08/28/2019 1.10 0.60 - 1.10 mg/dL Final       WBC   Date Value Ref Range Status   10/25/2019 7.0 4.0 - 11.0 thou/uL Final   08/28/2019 7.5 4.0 - 11.0 thou/uL Final     Hemoglobin   Date Value Ref Range Status   10/25/2019 13.2 12.0 - 16.0 g/dL Final "   08/28/2019 12.9 12.0 - 16.0 g/dL Final   07/31/2019 13.1 12.0 - 16.0 g/dL Final     Platelets   Date Value Ref Range Status   10/25/2019 159 140 - 440 thou/uL Final   08/28/2019 151 140 - 440 thou/uL Final   07/31/2019 151 140 - 440 thou/uL Final       Lab Results   Component Value Date    RF <15.0 10/10/2017    SEDRATE 9 05/29/2019

## 2021-06-03 VITALS — BODY MASS INDEX: 41.52 KG/M2 | WEIGHT: 227 LBS

## 2021-06-03 VITALS — HEIGHT: 62 IN | WEIGHT: 227.1 LBS | BODY MASS INDEX: 41.79 KG/M2

## 2021-06-03 VITALS
BODY MASS INDEX: 41.77 KG/M2 | DIASTOLIC BLOOD PRESSURE: 70 MMHG | WEIGHT: 227 LBS | HEIGHT: 62 IN | SYSTOLIC BLOOD PRESSURE: 128 MMHG

## 2021-06-03 VITALS — WEIGHT: 227 LBS | BODY MASS INDEX: 41.77 KG/M2 | HEIGHT: 62 IN

## 2021-06-03 VITALS — WEIGHT: 226 LBS | HEIGHT: 62 IN | BODY MASS INDEX: 41.59 KG/M2

## 2021-06-04 VITALS
SYSTOLIC BLOOD PRESSURE: 122 MMHG | HEART RATE: 58 BPM | BODY MASS INDEX: 41.77 KG/M2 | HEIGHT: 62 IN | DIASTOLIC BLOOD PRESSURE: 74 MMHG | WEIGHT: 227 LBS

## 2021-06-04 VITALS
RESPIRATION RATE: 14 BRPM | WEIGHT: 230 LBS | HEART RATE: 58 BPM | BODY MASS INDEX: 42.33 KG/M2 | HEIGHT: 62 IN | DIASTOLIC BLOOD PRESSURE: 64 MMHG | SYSTOLIC BLOOD PRESSURE: 118 MMHG

## 2021-06-05 NOTE — PROGRESS NOTES
ASSESSMENT AND PLAN:  Ani Castillo 86 y.o. female is seen here on 01/30/20 for follow-up of inflammatory polyarthritis widespread osteoarthritis in the background of ulcerative colitis.  She has renal impairment.  She is on sulfasalazine that she has tolerated well.  She takes acetaminophen.  Despite this she has residual pain more likely related with OA.  She cannot take nonsteroidals.  She is going to look into duloxetine and check with her primary physician if citalopram can be discontinued.  We will meet here in 3 months labs just prior.           Diagnoses and all orders for this visit:    Inflammatory polyarthritis (H)  -     sulfaSALAzine (AZULFIDINE) 500 mg tablet; Take 2 tablets (1,000 mg total) by mouth 2 (two) times a day.  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  She has inflammatory arthropathy in the background of ulcerative colitis, widespread advanced osteoarthritis.  She noted worsening pain such as in her right hand shoulder, which is worse with activity, she is undergoing physical therapy shortly, she has difficulty performing almost all her day-to-day activities.  She takes a small dose of acetaminophen.  She is aware not to take nonsteroidals.  She is taking sulfasalazine 2 tablets twice daily which is a gram twice daily..  This is in association with ulcerative colitis.  She has negative WILLIAMS anti-CCP antibody rheumatoid factor.    She has not observed swelling.  She has been taking sulfasalazine regularly.  Her recent labs are reviewed, within acceptable range, renal impairment noted and discussed.  She is aware not to take nonsteroidals.  Morning stiffness 1 to 2 hours.  She has not had fever weight loss blurry vision eye redness  mouth also nausea cough or rash.  She has chondrocalcinosis.   As she reports that she has had 3 knee surgeries.  She had what sounds like hemiarthroplasty of each knee and then a total replacement of the left knee.  She uses a brace for the right knee.  No other joint areas are similarly affected.  When she was having this acute phase she did not have any significant thigh, hip area or low back pain.  She did not have jaw claudication double vision or headaches.  There is no family history of psoriasis, rheumatoid arthritis.  Further historical information and ADL limitations as noted in the multidimensional health assessment questionnaire attached in the EMR.       Following is the excerpt from a recent note:   follow-up of painful joints affecting her upper extremities.  On her previous visit she was on 20 mg of prednisone.  With this her joint symptoms had disappeared.  She had been tapering as instructed and was down to 5 mg.  During this time she did not have recurrence of joint pains.  She however developed flareup of Crohn's with rectal bleed.  Her gastroenterologist and give her 40 mg of prednisone.  She gets joint pains toward the end of the day with activity.  She has no pain in the morning.  There is no stiffness.  She has not had any fever or weight loss blurry vision rash or mouth also.  She reports long-standing history of Crohn's.  She was on Remicade.  Prior to that she has had a joint replacement such as in the knees as noted later and had evidence of osteoarthritis.  However as she changed from Remicade to her current infusion for Crohn's all of a sudden she started developing pain.  This was severe.  This affected her upper extremities.  She had swelling of the hands.  She reached a point where she could not use her upper extremities she felt as if she was paralyzed.  She was given prednisone 20 mg by her gastroenterologist.  Within a matter of less than a day her symptoms resolved back to normal  self.  She is not today.  She loves prednisone because of this.  She cannot remember such episode happening with her joints in the past.  She holliday however had taken prednisone in the past for Crohn's.ALLERGIES:Metformin    PAST MEDICAL/ACTIVE PROBLEMS/MEDICATION/ FAMILY HISTORY/SOCIAL DATA:  The patient has a family history of  Past Medical History:   Diagnosis Date     Abnormal CT of the head 12/3/2018     Atrial fibrillation (H)      Benign essential hypertension     Created by Conversion      Cardiomyopathy (H)      CHF (congestive heart failure) (H)      Chronic kidney disease     ckd     Chronic Kidney Disease (NKF Classification)     Created by Conversion      Colitis      Diabetes mellitus (H)      Diastolic Congestive Heart Failure     Created by Conversion  Replacement Utility updated for latest IMO load     DM (diabetes mellitus) (H)     Created by Conversion      GI bleed 10/18/2016     Gout      Hyperlipidemia      Hyperlipidemia     Created by Conversion      Hypertension      Morbid obesity (H)      MALISSA (obstructive sleep apnea)      Persistent atrial fibrillation      [id number] score of 6 and on chronic warfarin Cardioversion 11/10,  7/11asymptomatic and on rate control       PRB (rectal bleeding)      Shortness of breath     Created by Conversion      Sleep apnea     uses a CPAP     SOB (shortness of breath)      Urinary incontinence      Urinary tract infection 12/1/2018     Social History     Tobacco Use   Smoking Status Never Smoker   Smokeless Tobacco Never Used     Patient Active Problem List   Diagnosis     DM (diabetes mellitus) (H)     Chronic Kidney Disease (NKF Classification)     Benign essential hypertension     Chronic diastolic CHF (congestive heart failure) (H)     Sleep apnea     Shortness of breath     Hyperlipidemia     Anemia due to blood loss, acute     Polyarthralgia     Primary osteoarthritis involving multiple joints     Inflammatory polyarthritis (H)     Chondrocalcinosis      Insulin dependent diabetes mellitus with complications (H)     Coronary artery disease involving native coronary artery without angina pectoris     Ulcerative colitis (H)     Hyperglycemia     Chronic ulcerative colitis with rectal bleeding (H)     Bloody diarrhea     Chronic atrial fibrillation     Diabetes mellitus, type II, insulin dependent (H)     History of coronary artery disease     Hypomagnesemia     Hyperkalemia     Cognitive and behavioral changes     Adjustment disorder with anxious mood     Current Outpatient Medications   Medication Sig Dispense Refill     acetaminophen (ACETAMINOPHEN EXTRA STRENGTH) 500 MG tablet Take 1,000 mg by mouth 2 (two) times a day as needed.        aspirin 81 mg chewable tablet Chew 81 mg daily.       blood sugar diagnostic (GLUCOSE BLOOD) Strp Use As Directed. OneTouch Ultra Blue In Vitro Strip.       escitalopram oxalate (LEXAPRO) 10 MG tablet Take 10 mg by mouth daily.       ferrous sulfate 325 (65 FE) MG tablet Take 1 tablet by mouth daily with breakfast.       insulin lispro (HUMALOG KWIKPEN) 100 unit/mL pen Inject 4 Units under the skin 3 (three) times a day with meals Hold dose if you don't eat. 5 adj dose pen prn     LACTOBACILLUS BIFIDUS ORAL Take 1 tablet by mouth daily.       LANTUS SOLOSTAR U-100 INSULIN 100 unit/mL (3 mL) pen Inject 30 Units under the skin daily with breakfast. (Patient taking differently: Inject 28 Units under the skin daily with breakfast.       ) 5 adj dose pen PRN     magnesium oxide (MAG-OX) 400 mg (241.3 mg magnesium) tablet Take 400 mg by mouth daily.       simvastatin (ZOCOR) 20 MG tablet Take 20 mg by mouth every morning .             sulfaSALAzine (AZULFIDINE) 500 mg tablet TAKE TWO TABLETS BY MOUTH TWICE DAILY  240 tablet 0     spironolactone (ALDACTONE) 25 MG tablet Take 0.5 tablets (12.5 mg total) by mouth daily. 30 tablet 5     No current facility-administered medications for this visit.        DETAILED EXAMINATION  01/30/20   ":  Vitals:    01/30/20 1636   BP: 122/74   Patient Site: Right Arm   Patient Position: Sitting   Cuff Size: Adult Regular   Pulse: (!) 58   Weight: (!) 227 lb (103 kg)   Height: 5' 2\" (1.575 m)     Alert oriented. Head including the face is examined for malar rash, heliotropes, scarring, lupus pernio. Eyes examined for redness such as in episcleritis/scleritis, periorbital lesions.   Neck/ Face examined for parotid gland swelling, range of motion of neck.  Left upper and lower and right upper and lower extremities examined for tenderness, swelling, warmth of the appendicular joints, range of motion, edema, rash.  Some of the important findings included: She does not have synovitis and palpable joints of her upper extremities but extensive changes of OA including Heberden's, Jesus's squaring of the first CMC's, bilateral TKA scars.  Mild impingement of the shoulder especially in the right side and abduction.         LAB / IMAGING DATA:  ALT   Date Value Ref Range Status   01/28/2020 17 0 - 45 U/L Final   10/25/2019 24 0 - 45 U/L Final   09/10/2019 21 0 - 45 U/L Final     Albumin   Date Value Ref Range Status   01/28/2020 3.9 3.5 - 5.0 g/dL Final   10/25/2019 4.0 3.5 - 5.0 g/dL Final   09/10/2019 3.7 3.5 - 5.0 g/dL Final     Creatinine   Date Value Ref Range Status   01/28/2020 1.21 (H) 0.60 - 1.10 mg/dL Final   10/25/2019 1.25 (H) 0.60 - 1.10 mg/dL Final   09/10/2019 1.08 0.60 - 1.10 mg/dL Final       WBC   Date Value Ref Range Status   01/28/2020 7.2 4.0 - 11.0 thou/uL Final   10/25/2019 7.0 4.0 - 11.0 thou/uL Final     Hemoglobin   Date Value Ref Range Status   01/28/2020 12.4 12.0 - 16.0 g/dL Final   10/25/2019 13.2 12.0 - 16.0 g/dL Final   08/28/2019 12.9 12.0 - 16.0 g/dL Final     Platelets   Date Value Ref Range Status   01/28/2020 172 140 - 440 thou/uL Final   10/25/2019 159 140 - 440 thou/uL Final   08/28/2019 151 140 - 440 thou/uL Final       Lab Results   Component Value Date    RF <15.0 10/10/2017    " SEDRATE 9 05/29/2019

## 2021-06-06 NOTE — PROGRESS NOTES
Notes recorded by Rony Verma MD on 3/13/2020 at 1:52 PM CDT   SALMA Moss natruretic peptide is normal so she does not need more diuretic.  In addition her BUN is a bit elevated.  She seems clinically quite stable.  Could we repeat a basic metabolic panel in 3 to 4 weeks?     Thanks,     Rony ORTEGA ordered.  -[facility]

## 2021-06-10 NOTE — TELEPHONE ENCOUNTER
Refilled per Rheum RN refill protocol    Today's advice/conversation is in the additional context of the current extreme COVID-19 pandemic circumstances.

## 2021-06-11 NOTE — PROGRESS NOTES
"Ani Castillo is a 87 y.o. female who is being evaluated via a billable telephone visit.      The patient has been notified of following:     \"This telephone visit will be conducted via a call between you and your physician/provider. We have found that certain health care needs can be provided without the need for a physical exam.  This service lets us provide the care you need with a short phone conversation.  If a prescription is necessary we can send it directly to your pharmacy.  If lab work is needed we can place an order for that and you can then stop by our lab to have the test done at a later time.    Telephone visits are billed at different rates depending on your insurance coverage. During this emergency period, for some insurers they may be billed the same as an in-person visit.  Please reach out to your insurance provider with any questions.    If during the course of the call the physician/provider feels a telephone visit is not appropriate, you will not be charged for this service.\"    Patient has given verbal consent to a Telephone visit? Yes    What phone number would you like to be contacted at? [phone]    Patient would like to receive their AVS by AVS Preference: Rishabh. declined       ASSESSMENT AND PLAN:    Diagnoses and all orders for this visit:    Inflammatory polyarthritis (H)  -     sulfaSALAzine (AZULFIDINE) 500 mg tablet; Take 2 tablets (1,000 mg total) by mouth 2 (two) times a day.  Dispense: 240 tablet; Refill: 0    Chondrocalcinosis    Primary osteoarthritis involving multiple joints    Stage 3 chronic kidney disease (H)    Ulcerative pancolitis with rectal bleeding (H)          HISTORY OF PRESENTING ILLNESS:  Ani Castillo 87 y.o. is evaluated here via phone link.  This is for follow-up.  She has inflammatory joint disease in the background of ulcerative colitis going on for several years, on sulfasalazine, osteoarthritis.  For the past 2 months she has had pain.  This is the " right hand.  This is worse overnight first thing in the morning.  At times she wonders if her right thumb index and middle finger are swollen certainly they are numb and tingly.  She has tried over-the-counter measures without help.  In view of the chronic renal impairment grade 3 and ulcerative colitis not a candidate for nonsteroidals.  She has been taking sulfasalazine regularly.  As she is not woken up because of this from sleep however getting to sleep is harder.  I have discussed this with her this could be carpal tunnel syndrome.  Worsening of her inflammatory arthritis.  We need to get in person.  We will arrange for this to be soon as possible.  Meanwhile I have recommended that she considers using a carpal tunnel brace for the right hand.   ROS enquiry held for fever, ocular symptoms, rash, headache,  GI issues.  Today we also discussed the issues related to the current pandemic, the pros and cons of the current treatment plan, the CDC guidelines such as social distancing washing the hands covering the cough.  ALLERGIES:Metformin    PAST MEDICAL/ACTIVE PROBLEMS/MEDICATION/SOCIAL DATA  Past Medical History:   Diagnosis Date     Abnormal CT of the head 12/3/2018     Atrial fibrillation (H)      Benign essential hypertension     Created by Conversion      Cardiomyopathy (H)      CHF (congestive heart failure) (H)      Chronic kidney disease     ckd     Chronic Kidney Disease (NKF Classification)     Created by Conversion      Colitis      Diabetes mellitus (H)      Diastolic Congestive Heart Failure     Created by Conversion  Replacement Utility updated for latest IMO load     DM (diabetes mellitus) (H)     Created by Conversion      GI bleed 10/18/2016     Gout      Hyperlipidemia      Hyperlipidemia     Created by Conversion      Hypertension      Morbid obesity (H)      MALISSA (obstructive sleep apnea)      Persistent atrial fibrillation (H)      [id number] score of 6 and on chronic warfarin Cardioversion  11/10,  7/11asymptomatic and on rate control       PRB (rectal bleeding)      Shortness of breath     Created by Conversion      Sleep apnea     uses a CPAP     SOB (shortness of breath)      Urinary incontinence      Urinary tract infection 12/1/2018     Social History     Tobacco Use   Smoking Status Never Smoker   Smokeless Tobacco Never Used     Patient Active Problem List   Diagnosis     DM (diabetes mellitus) (H)     Chronic Kidney Disease (NKF Classification)     Benign essential hypertension     Chronic diastolic CHF (congestive heart failure) (H)     Sleep apnea     Shortness of breath     Hyperlipidemia     Anemia due to blood loss, acute     Polyarthralgia     Primary osteoarthritis involving multiple joints     Inflammatory polyarthritis (H)     Chondrocalcinosis     Insulin dependent diabetes mellitus with complications     Coronary artery disease involving native coronary artery without angina pectoris     Ulcerative colitis (H)     Hyperglycemia     Chronic ulcerative colitis with rectal bleeding (H)     Bloody diarrhea     Chronic atrial fibrillation (H)     Diabetes mellitus, type II, insulin dependent (H)     History of coronary artery disease     Hypomagnesemia     Hyperkalemia     Cognitive and behavioral changes     Adjustment disorder with anxious mood     Current Outpatient Medications   Medication Sig Dispense Refill     acetaminophen (ACETAMINOPHEN EXTRA STRENGTH) 500 MG tablet Take 1,000 mg by mouth 2 (two) times a day as needed.        aspirin 81 mg chewable tablet Chew 81 mg daily.       blood sugar diagnostic (GLUCOSE BLOOD) Strp Use As Directed. OneTouch Ultra Blue In Vitro Strip.       escitalopram oxalate (LEXAPRO) 10 MG tablet Take 10 mg by mouth daily.       ferrous sulfate 325 (65 FE) MG tablet Take 1 tablet by mouth daily with breakfast.       insulin lispro (HUMALOG KWIKPEN) 100 unit/mL pen Inject 4 Units under the skin 3 (three) times a day with meals Hold dose if you don't eat.  5 adj dose pen prn     LACTOBACILLUS BIFIDUS ORAL Take 1 tablet by mouth daily.       LANTUS SOLOSTAR U-100 INSULIN 100 unit/mL (3 mL) pen Inject 30 Units under the skin daily with breakfast. (Patient taking differently: Inject 28 Units under the skin daily with breakfast.       ) 5 adj dose pen PRN     magnesium oxide (MAG-OX) 400 mg (241.3 mg magnesium) tablet Take 400 mg by mouth daily.       simvastatin (ZOCOR) 20 MG tablet Take 20 mg by mouth every morning .             spironolactone (ALDACTONE) 25 MG tablet Take 0.5 tablets (12.5 mg total) by mouth daily. 30 tablet 5     sulfaSALAzine (AZULFIDINE) 500 mg tablet Take 2 tablets (1,000 mg total) by mouth 2 (two) times a day. 240 tablet 0     No current facility-administered medications for this visit.          EXAMINATION:    She sounded comfortable alert oriented speech was fluent.  She did not sound like she was in pain.      LAB / IMAGING DATA:  ALT   Date Value Ref Range Status   09/08/2020 19 0 - 45 U/L Final   06/10/2020 19 0 - 45 U/L Final   01/28/2020 17 0 - 45 U/L Final     Albumin   Date Value Ref Range Status   09/08/2020 3.8 3.5 - 5.0 g/dL Final   06/10/2020 3.9 3.5 - 5.0 g/dL Final   01/28/2020 3.9 3.5 - 5.0 g/dL Final     Creatinine   Date Value Ref Range Status   09/08/2020 1.28 (H) 0.60 - 1.10 mg/dL Final   06/10/2020 1.17 (H) 0.60 - 1.10 mg/dL Final   03/12/2020 1.12 (H) 0.60 - 1.10 mg/dL Final       WBC   Date Value Ref Range Status   09/08/2020 8.8 4.0 - 11.0 thou/uL Final   06/10/2020 9.5 4.0 - 11.0 thou/uL Final     Hemoglobin   Date Value Ref Range Status   09/08/2020 12.7 12.0 - 16.0 g/dL Final   06/10/2020 12.8 12.0 - 16.0 g/dL Final   01/28/2020 12.4 12.0 - 16.0 g/dL Final     Platelets   Date Value Ref Range Status   09/08/2020 168 140 - 440 thou/uL Final   06/10/2020 154 140 - 440 thou/uL Final   01/28/2020 172 140 - 440 thou/uL Final       Lab Results   Component Value Date    RF <15.0 10/10/2017    SEDRATE 9 05/29/2019     Duration  of the call:7  Minutes  Call start: 1134  am  Call end:   1141am

## 2021-06-12 NOTE — TELEPHONE ENCOUNTER
Please schedule a f/u with Dr Braden. Pt was having pain at her last visit. Schedule in person if pain has persisted, otherwise schedule a f/u 4-6 months from her 9/202 visit. Pt needs labs every 2-3 months.

## 2021-06-12 NOTE — TELEPHONE ENCOUNTER
10/06 - Pt scheduled f/u in feb. Notified that she needs labs every 2 months. She will call us back to schedule. She had an emergency.

## 2021-06-13 NOTE — PROGRESS NOTES
ASSESSMENT AND PLAN:  Ani Castillo 84 y.o. female is seen here on 10/10/17 for evaluation of polyarthralgias.  She developed an acute onset of pain swelling stiffness affecting upper extremity joints as she discontinued Remicade and started and tibial.  She was started on prednisone and had a very brisk response to that within a matter of less than a day she was back to her usual self.  She has background of osteoarthritis.  She has had knee replacement surgeries as noted.  While she has clear evidence of osteoarthritis that is unlikely to be the explanation for her acute onset of symptoms.  It appears as if she had an inflammatory process affecting most of the upper extremity joints.  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  Jl polymyalgia rheumatica.  We discussed other options to keep under consideration including rheumatoid arthritis.  That she is on prednisone 20 mg and feels back to her normal self also means that there are no physical findings of synovitis today.  We discussed various options.  Will check x-rays of the hands and shoulders and labs as ordered.  Will begin to taper prednisone.  She is aware of the various scenarios that may ensue as she tapers prednisone including no recurrence for a long time, a prompt recurrence of her symptoms or a gradual recurrence.  Each 1 of these scenarios may signify unique set of inflammatory process.  X-rays of the hands taken today, personally reviewed, findings chondrocalcinosis right wrist, second MCP joint space loss on the right side with the osteophyte formation, and scattered interphalangeal joint space reduction.  We will meet here in the next 6-8 weeks as she tapers prednisone as noted.  Diagnoses and all orders for this visit:    Polyarthralgia  -     predniSONE (DELTASONE) 5 MG tablet; 17-1/2 mg or 1 week reduce by 2.5 mg every week to 15, 12.5, 10 7-1/2 and 5 mg over the next 6 weeks.  Continue 5 mg still seen here.  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  She reports long-standing history of Crohn's.  She was on Remicade.  Prior to that she has had a joint replacement such as in the knees as noted later and had evidence of osteoarthritis.  However as she changed from Remicade to her current infusion for Crohn's all of a sudden she started developing pain.  This was severe.  This affected her upper extremities.  She had swelling of the hands.  She reached a point where she could not use her upper extremities she felt as if she was paralyzed.  She was given prednisone 20 mg by her gastroenterologist.  Within a matter of less than a day her symptoms resolved back to normal self.  She is not today.  She loves prednisone because of this.  She cannot remember such episode happening with her joints in the past.  She holliday however had taken prednisone in the past for Crohn's.  As she reports that she has had 3 knee surgeries.  She had what sounds like hemiarthroplasty of each knee and then a total replacement of the left knee.  She uses a brace for the right knee.  No other joint areas are similarly affected.  When she was having this acute phase she did not have any significant thigh, hip area or low back pain.  She did not have jaw claudication double vision or headaches.  There is no family history of psoriasis, rheumatoid arthritis.  Further  historical information and ADL limitations as noted in the multidimensional health assessment questionnaire attached in the EMR. Rest of the 13 system ROS is negative.     ALLERGIES:Review of patient's allergies indicates no known allergies.    PAST MEDICAL/ACTIVE PROBLEMS/MEDICATION/ FAMILY HISTORY/SOCIAL DATA:  The patient has a family history of  Past Medical History:   Diagnosis Date     Atrial fibrillation      Cardiomyopathy      CHF (congestive heart failure)      Chronic kidney disease     ckd     Colitis      Diabetes mellitus      Gout      Hyperlipidemia      Hypertension      Morbid obesity      MALISSA (obstructive sleep apnea)      SOB (shortness of breath)      Urinary incontinence      History   Smoking Status     Never Smoker   Smokeless Tobacco     Not on file     Patient Active Problem List   Diagnosis     DM (diabetes mellitus)     Chronic Kidney Disease (NKF Classification)     Benign essential hypertension     Persistent atrial fibrillation     Diastolic Congestive Heart Failure     Sleep Apnea     Shortness Of Breath     Hyperlipidemia     GI bleed     PRB (rectal bleeding)     Acute blood loss anemia     Supratherapeutic INR     Polyarthralgia     Crohn disease     Current Outpatient Prescriptions   Medication Sig Dispense Refill     acetaminophen (ACETAMINOPHEN EXTRA STRENGTH) 500 MG tablet Take 1,000 mg by mouth 2 (two) times a day as needed.        allopurinol (ZYLOPRIM) 100 MG tablet Take 200 mg by mouth daily.       aspirin 81 mg chewable tablet Chew 81 mg daily.       blood sugar diagnostic (GLUCOSE BLOOD) Strp Use As Directed. OneTouch Ultra Blue In Vitro Strip.       CALCIUM CARBONATE (CALCIUM 500 ORAL) Take 1 tablet by mouth daily. Tablet.       cholecalciferol, vitamin D3, (VITAMIN D3) 1,000 unit capsule Take 1,000 Units by mouth daily.        furosemide (LASIX) 40 MG tablet Take 80 mg by mouth daily.       glimepiride (AMARYL) 1 MG tablet Take 2 mg by mouth daily.        glimepiride  "(AMARYL) 4 MG tablet Take 4 mg by mouth daily before breakfast.       glucosamine sulfate 500 mg cap Take 500 mg by mouth daily.       mesalamine (LIALDA) 1.2 gram EC tablet Take 4,800 mg by mouth daily. Takes 4 tablets daily       MULTIVITAMIN ORAL Take 1 tablet by mouth daily. TABS.       polyethylene glycol (MIRALAX) 17 gram packet Take 17 g by mouth daily.       potassium chloride 20 mEq TbER Take 20 mEq by mouth 2 (two) times a day.        predniSONE (DELTASONE) 20 MG tablet Take 20 mg by mouth daily.       psyllium seed, sugar, (METAMUCIL) Powd Take by mouth daily. Takes a \"heaping spoonful\" once daily       simvastatin (ZOCOR) 20 MG tablet Take 20 mg by mouth bedtime.        spironolactone (ALDACTONE) 25 MG tablet        vedolizumab (ENTYVIO) 300 mg SolR injection Infuse 300 mg into a venous catheter every 2 (two) months.       warfarin (COUMADIN) 3 MG tablet Take 3 mg by mouth daily. Aneudy 4.5mg 3 d and 3mg 4 days orally once a day       No current facility-administered medications for this visit.        COMPREHENSIVE EXAMINATION:  Vitals:    10/10/17 1015   BP: 118/72   Pulse: 68   Weight: (!) 222 lb 6.4 oz (100.9 kg)   Height: 5' 2\" (1.575 m)     A well appearing alert oriented female. Vital data as noted above. Her eyes without inflammation/scleromalacia. ENTwithout oral mucositis, thrush, nasal deformity, external ear redness, deformity. Her neck is without lymphadenopathy and supple. Lungs normal sounds, no pleural rub. Heart auscultation normal rate, rhythm; no pericardial rub and murmurs. Abdomen soft, non tender, no organomegaly. Skin examined for heliotrope, malar area eruption, lupus pernio, periungual erythema, sclerodactyly, papules, erythema nodosum, purpura, nail pitting, onycholysis, and obvious psoriasis lesion. Neurological examination shows normal alertness, speech, facial symmetry, tone and power in upper and lower extremities, Tinel's and Phalen's at wrist and gait. The joint examination is " performed for swelling, tenderness, warmth, erythema, and range of motion in the following joints: DIPs, PIPs, MCPs, wrists, first CMC's, elbows, shoulders, hips, knees, ankles, feet; spine for range of motion and paraspinal muscles for tenderness. The salient normal / abnormal findings are appended.  She has marked Heberden nodes she has deformities such as in her middle finger and the index finger and the fifth digit on both sides which are commensurate with osteoarthritis.  She has squaring of the first CMC's bilaterally worse on the right side.  She has scars from her prior surgeries in the knees.  She is able to have full range of motion in the shoulders however.  There is no synovitis in any of the palpable appendicular joints.  She has minimal edema of the lower extremities.  There is no dactylitis enthesitis.  There is no nail pitting.    LAB / IMAGING DATA:  ALT   Date Value Ref Range Status   06/12/2017 45 0 - 45 U/L Final   11/10/2016 18 0 - 45 U/L Final   10/18/2016 14 0 - 45 U/L Final     Albumin   Date Value Ref Range Status   06/12/2017 3.8 3.5 - 5.0 g/dL Final   03/02/2017 3.6 3.5 - 5.0 g/dL Final   11/10/2016 3.2 (L) 3.5 - 5.0 g/dL Final     Creatinine   Date Value Ref Range Status   10/05/2017 1.00 0.60 - 1.10 mg/dL Final   06/12/2017 1.09 0.60 - 1.10 mg/dL Final   03/02/2017 1.15 (H) 0.60 - 1.10 mg/dL Final       WBC   Date Value Ref Range Status   10/19/2016 6.9 4.0 - 11.0 thou/uL Final   10/17/2016 8.9 4.0 - 11.0 thou/uL Final     Hemoglobin   Date Value Ref Range Status   10/19/2016 9.1 (L) 12.0 - 16.0 g/dL Final   10/18/2016 10.3 (L) 12.0 - 16.0 g/dL Final   10/18/2016 9.8 (L) 12.0 - 16.0 g/dL Final     Platelets   Date Value Ref Range Status   10/19/2016 247 140 - 440 thou/uL Final   10/17/2016 292 140 - 440 thou/uL Final   03/11/2016 211 140 - 440 thou/uL Final       Lab Results   Component Value Date    SEDRATE 80 (H) 03/08/2016

## 2021-06-14 NOTE — PROGRESS NOTES
ASSESSMENT AND PLAN:  Ani Castillo 84 y.o. female is seen here on 12/08/17 for follow-up of polyarthralgias in the background of Crohn's.  She had several signals which suggested that she had an inflammatory process, we discussed the differential including inflammatory arthropathy of Crohn's disease, polymyalgia rheumatica or even rheumatoid arthritis.  On her visit then I had asked her to begin to taper the prednisone she was on 20 mg and was able to go down to 5 without recurrence of her original joint symptoms.  However her Crohn's flared up and she is back to 40 mg of prednisone. While she is on such a large dose of prednisone it is hard to make a determination if she has ongoing inflammatory arthropathy, however the the fact that with 5 mg he was so much better to is encouraging.  She expects to cut down on prednisone over the next near future.  I have asked her to return for follow-up here in 3 months.    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  On her previous visit she was on 20 mg of prednisone.  With this her joint symptoms had disappeared.  She had been tapering as instructed and was down to 5 mg.  During this time she did not have recurrence of joint pains.  She however developed flareup of Crohn's with rectal bleed.  Her gastroenterologist and give her 40 mg of prednisone.  She gets joint pains toward the end of the day with activity.  She has no pain in the morning.  There is no stiffness.  She has not had any fever or weight loss blurry vision rash or mouth also.  She reports long-standing history of Crohn's.  She was on Remicade.  Prior to that she has had a joint replacement such as in the  knees as noted later and had evidence of osteoarthritis.  However as she changed from Remicade to her current infusion for Crohn's all of a sudden she started developing pain.  This was severe.  This affected her upper extremities.  She had swelling of the hands.  She reached a point where she could not use her upper extremities she felt as if she was paralyzed.  She was given prednisone 20 mg by her gastroenterologist.  Within a matter of less than a day her symptoms resolved back to normal self.  She is not today.  She loves prednisone because of this.  She cannot remember such episode happening with her joints in the past.  She holliday however had taken prednisone in the past for Crohn's.  As she reports that she has had 3 knee surgeries.  She had what sounds like hemiarthroplasty of each knee and then a total replacement of the left knee.  She uses a brace for the right knee.  No other joint areas are similarly affected.  When she was having this acute phase she did not have any significant thigh, hip area or low back pain.  She did not have jaw claudication double vision or headaches.  There is no family history of psoriasis, rheumatoid arthritis.  Further historical information and ADL limitations as noted in the multidimensional health assessment questionnaire attached in the EMR.   ALLERGIES:Review of patient's allergies indicates no known allergies.    PAST MEDICAL/ACTIVE PROBLEMS/MEDICATION/ FAMILY HISTORY/SOCIAL DATA:  The patient has a family history of  Past Medical History:   Diagnosis Date     Atrial fibrillation      Cardiomyopathy      CHF (congestive heart failure)      Chronic kidney disease     ckd     Colitis      Diabetes mellitus      Gout      Hyperlipidemia      Hypertension      Morbid obesity      MALISSA (obstructive sleep apnea)      SOB (shortness of breath)      Urinary incontinence      History   Smoking Status     Never Smoker   Smokeless Tobacco     Not on file     Patient Active Problem  "List   Diagnosis     DM (diabetes mellitus)     Chronic Kidney Disease (NKF Classification)     Benign essential hypertension     Persistent atrial fibrillation     Diastolic Congestive Heart Failure     Sleep Apnea     Shortness Of Breath     Hyperlipidemia     GI bleed     PRB (rectal bleeding)     Acute blood loss anemia     Supratherapeutic INR     Polyarthralgia     Crohn disease     Current Outpatient Prescriptions   Medication Sig Dispense Refill     acetaminophen (ACETAMINOPHEN EXTRA STRENGTH) 500 MG tablet Take 1,000 mg by mouth 2 (two) times a day as needed.        allopurinol (ZYLOPRIM) 100 MG tablet Take 200 mg by mouth daily.       aspirin 81 mg chewable tablet Chew 81 mg daily.       blood sugar diagnostic (GLUCOSE BLOOD) Strp Use As Directed. OneTouch Ultra Blue In Vitro Strip.       CALCIUM CARBONATE (CALCIUM 500 ORAL) Take 1 tablet by mouth daily. Tablet.       cholecalciferol, vitamin D3, (VITAMIN D3) 1,000 unit capsule Take 1,000 Units by mouth daily.        furosemide (LASIX) 40 MG tablet Take 80 mg by mouth daily.       glimepiride (AMARYL) 4 MG tablet Take 4 mg by mouth daily before breakfast.       glucosamine sulfate 500 mg cap Take 500 mg by mouth daily.       MULTIVITAMIN ORAL Take 1 tablet by mouth daily. TABS.       potassium chloride 20 mEq TbER Take 20 mEq by mouth 2 (two) times a day.        predniSONE (DELTASONE) 5 MG tablet 17-1/2 mg or 1 week reduce by 2.5 mg every week to 15, 12.5, 10 7-1/2 and 5 mg over the next 6 weeks.  Continue 5 mg still seen here. 120 tablet 1     psyllium seed, sugar, (METAMUCIL) Powd Take by mouth daily. Takes a \"heaping spoonful\" once daily       simvastatin (ZOCOR) 20 MG tablet Take 20 mg by mouth bedtime.        vedolizumab (ENTYVIO) 300 mg SolR injection Infuse 300 mg into a venous catheter every 2 (two) months.       warfarin (COUMADIN) 3 MG tablet Take 3 mg by mouth daily. Aneudy 4.5mg 3 d and 3mg 4 days orally once a day       polyethylene glycol " "(MIRALAX) 17 gram packet Take 17 g by mouth daily.       No current facility-administered medications for this visit.        DETAILED EXAMINATION  12/08/17  :  Vitals:    12/08/17 0945   Weight: 219 lb (99.3 kg)   Height: 5' 2\" (1.575 m)     Alert oriented. Head including the face is examined for malar rash, heliotropes, scarring, lupus pernio. Eyes examined for redness such as in episcleritis/scleritis, periorbital lesions.   Neck/ Face examined for parotid gland swelling, range of motion of neck.  Left upper and lower and right upper and lower extremities examined for tenderness, swelling, warmth of the appendicular joints, range of motion, edema, rash.  Some of the important findings included: Heberden nodes and deformities.  Squaring of the first CMC's.  Prior knee surgeries. No appreciable synovitis in any of the palpable appendicular joints.  Near complete range of motion the shoulders.  LAB / IMAGING DATA:  ALT   Date Value Ref Range Status   06/12/2017 45 0 - 45 U/L Final   11/10/2016 18 0 - 45 U/L Final   10/18/2016 14 0 - 45 U/L Final     Albumin   Date Value Ref Range Status   06/12/2017 3.8 3.5 - 5.0 g/dL Final   03/02/2017 3.6 3.5 - 5.0 g/dL Final   11/10/2016 3.2 (L) 3.5 - 5.0 g/dL Final     Creatinine   Date Value Ref Range Status   10/05/2017 1.00 0.60 - 1.10 mg/dL Final   06/12/2017 1.09 0.60 - 1.10 mg/dL Final   03/02/2017 1.15 (H) 0.60 - 1.10 mg/dL Final       WBC   Date Value Ref Range Status   10/10/2017 9.4 4.0 - 11.0 thou/uL Final   10/19/2016 6.9 4.0 - 11.0 thou/uL Final     Hemoglobin   Date Value Ref Range Status   10/10/2017 12.4 12.0 - 16.0 g/dL Final   10/19/2016 9.1 (L) 12.0 - 16.0 g/dL Final   10/18/2016 10.3 (L) 12.0 - 16.0 g/dL Final     Platelets   Date Value Ref Range Status   10/10/2017 250 140 - 440 thou/uL Final   10/19/2016 247 140 - 440 thou/uL Final   10/17/2016 292 140 - 440 thou/uL Final       Lab Results   Component Value Date    RF <15.0 10/10/2017    SEDRATE 74 (H) " 10/17/2017

## 2021-06-14 NOTE — TELEPHONE ENCOUNTER
----- Message from Martha Dean RN sent at 12/31/2020  7:44 AM CST -----  Please call pt to schedule lab only appt now for Dr Braden.

## 2021-06-14 NOTE — TELEPHONE ENCOUNTER
Reason contacted:  To help the Pt schedule a lab appt  Information relayed:  Pt states she is going to do some other labs with her PCP in the next couple of weeks and she is not sure if she will need to continue to see Dr. Braden anymore.  Pt states she will call back to schedule labs if she needs to and did not want us to call her back to schedule.    Additional questions:  No  Further follow-up needed:  No  Okay to leave a detailed message:  Yes

## 2021-06-16 PROBLEM — M15.0 PRIMARY OSTEOARTHRITIS INVOLVING MULTIPLE JOINTS: Status: ACTIVE | Noted: 2017-12-08

## 2021-06-16 PROBLEM — M25.50 POLYARTHRALGIA: Status: ACTIVE | Noted: 2017-10-10

## 2021-06-16 PROBLEM — E87.5 HYPERKALEMIA: Status: ACTIVE | Noted: 2018-12-01

## 2021-06-16 PROBLEM — K51.911: Status: ACTIVE | Noted: 2018-09-07

## 2021-06-16 PROBLEM — K51.90 ULCERATIVE COLITIS (H): Status: ACTIVE | Noted: 2018-08-23

## 2021-06-16 PROBLEM — I25.10 CORONARY ARTERY DISEASE INVOLVING NATIVE CORONARY ARTERY WITHOUT ANGINA PECTORIS: Status: ACTIVE | Noted: 2018-04-11

## 2021-06-16 PROBLEM — M06.4 INFLAMMATORY POLYARTHRITIS (H): Status: ACTIVE | Noted: 2017-12-08

## 2021-06-16 PROBLEM — M11.20 CHONDROCALCINOSIS: Status: ACTIVE | Noted: 2017-12-08

## 2021-06-16 NOTE — PROGRESS NOTES
[facility] Consult Note    Date of Service:  3/26/2018    Requesting Provider: Scott Gill MD      History of Present Illness:     Ani Castillo presents to clinic with her daughter regarding her skin tear.  On March 14, she fell while at her physicians office.  She sustained a large left arm skin tear and a Tegaderm was applied.  It bleed through the edge of the Tegaderm and an ABD was applied for absorption.  Several days later, she returned for a recheck and the Tegaderm was left intact.  She denies any pain in wound and it has stopped draining.       Review of Symptoms:    Constitutional:  Denies fever, chills or night sweats    Integumentary: See above. Skin is uniformly dry and pink.    Psych: Yes, currently being treated depression/anxiety      ENTM:  Hard of Hearing: is not significant     GI: Nutritional intake:  Appetite is good,   Taking Nutritional supplements: No  W     Bowels: IBS     : reports incontinence, reports frequency and reports urgency, but I managed    MSK: Patient is ambulatory; does use an assistive device :cane and walker      Neurological:  No weakness, numbness, or tingling    Cardiac:  Denies new chest pain or tightness.    Respiratory:  Denies any unusual SOB    Endocrine: is diabetic for 10years.  A1c: unknown and Diabetic management by primary       Past Medical History:    Past Medical History:   Diagnosis Date     Atrial fibrillation      Benign essential hypertension     Created by Conversion      Cardiomyopathy      CHF (congestive heart failure)      Chronic kidney disease     ckd     Chronic Kidney Disease (NKF Classification)     Created by Conversion      Colitis      Diabetes mellitus      Diastolic Congestive Heart Failure     Created by Conversion  Replacement Utility updated for latest IMO load     DM (diabetes mellitus)     Created by Conversion      GI bleed 10/18/2016     Gout      Hyperlipidemia      Hyperlipidemia     Created by  Conversion      Hypertension      Morbid obesity      MALISSA (obstructive sleep apnea)      Persistent atrial fibrillation      [id number] score of 6 and on chronic warfarin Cardioversion 11/10,  7/11asymptomatic and on rate control       PRB (rectal bleeding)      Shortness of breath     Created by Conversion      Sleep apnea     uses a CPAP     SOB (shortness of breath)      Urinary incontinence         Surgical History:   Past Surgical History:   Procedure Laterality Date     BACK SURGERY       CARDIAC CATHETERIZATION  03/11/2016     [place] APPENDECTOMY      Description: Appendectomy;  Recorded: 06/26/2013;     [place] ARTHROPLASTY TIBIAL PLATEAU      Description: Knee Replacement;  Recorded: 06/26/2013; 3 surgery     SIGMOIDOSCOPY N/A 10/18/2016    Procedure: SIGMOIDOSCOPY with biopsy;  Surgeon: Angeles Bianchi MD;  Location: [facility];  Service:        Allergies: No Known Allergies       Medications:    Current Outpatient Prescriptions:      acetaminophen (ACETAMINOPHEN EXTRA STRENGTH) 500 MG tablet, Take 1,000 mg by mouth 2 (two) times a day as needed. , Disp: , Rfl:      allopurinol (ZYLOPRIM) 100 MG tablet, Take 200 mg by mouth daily., Disp: , Rfl:      aspirin 81 mg chewable tablet, Chew 81 mg daily., Disp: , Rfl:      blood sugar diagnostic (GLUCOSE BLOOD) Strp, Use As Directed. OneTouch Ultra Blue In Vitro Strip., Disp: , Rfl:      CALCIUM CARBONATE (CALCIUM 500 ORAL), Take 1 tablet by mouth daily. Tablet., Disp: , Rfl:      cholecalciferol, vitamin D3, (VITAMIN D3) 1,000 unit capsule, Take 1,000 Units by mouth daily. , Disp: , Rfl:      escitalopram oxalate (LEXAPRO) 5 MG tablet, Take 5 mg by mouth daily., Disp: , Rfl:      furosemide (LASIX) 40 MG tablet, Take 40 mg by mouth daily. , Disp: , Rfl:      glimepiride (AMARYL) 4 MG tablet, Take 4 mg by mouth daily before breakfast., Disp: , Rfl:      glucosamine sulfate 500 mg cap, Take 500 mg by mouth daily., Disp: , Rfl:      MULTIVITAMIN ORAL, Take 1 tablet by  "mouth daily. TABS., Disp: , Rfl:      potassium chloride 20 mEq TbER, Take 20 mEq by mouth 2 (two) times a day. , Disp: , Rfl:      predniSONE (DELTASONE) 5 MG tablet, 17-1/2 mg or 1 week reduce by 2.5 mg every week to 15, 12.5, 10 7-1/2 and 5 mg over the next 6 weeks.  Continue 5 mg still seen here., Disp: 120 tablet, Rfl: 1     simvastatin (ZOCOR) 20 MG tablet, Take 20 mg by mouth bedtime. , Disp: , Rfl:      vedolizumab (ENTYVIO) 300 mg SolR injection, Infuse 300 mg into a venous catheter every 2 (two) months., Disp: , Rfl:      warfarin (COUMADIN) 3 MG tablet, Take 3 mg by mouth daily. Aneudy 4.5mg 3 d and 3mg 4 days orally once a day, Disp: , Rfl:      polyethylene glycol (MIRALAX) 17 gram packet, Take 17 g by mouth daily., Disp: , Rfl:      psyllium seed, sugar, (METAMUCIL) Powd, Take by mouth daily. Takes a \"heaping spoonful\" once daily, Disp: , Rfl:     Family history:   Family History   Problem Relation Age of Onset     No Medical Problems Mother      No Medical Problems Father      Breast cancer Sister      Colon cancer Sister      No Medical Problems Daughter      No Medical Problems Maternal Grandmother      No Medical Problems Maternal Grandfather      No Medical Problems Paternal Grandmother      No Medical Problems Paternal Grandfather      No Medical Problems Maternal Aunt      No Medical Problems Paternal Aunt      BRCA 1/2 Neg Hx      Cancer Neg Hx      Endometrial cancer Neg Hx      Ovarian cancer Neg Hx          Social History:   Social History     Social History     Marital status:      Spouse name: N/A     Number of children: N/A     Years of education: N/A     Occupational History     Not on file.     Social History Main Topics     Smoking status: Never Smoker     Smokeless tobacco: Not on file     Alcohol use No     Drug use: No     Sexual activity: Not on file     Other Topics Concern     Not on file     Social History Narrative    Lives with her .        Physical Exam    General: " This is a 85 y.o. female who appears their reported age, not in acute distress    Constitution:  Vital Signs: /56  Pulse 64  Temp 98.8  F (37.1  C)  Resp 16  Wt 219 lb 1.6 oz (99.4 kg)  BMI 40.07 kg/m2 Body mass index is 40.07 kg/(m^2).    Psychiatric: Alert and oriented X 3, in no apparent distress. Calm and cooperative throughout exam    Head/Neck:  Normocephalic, atraumatic    Cardiovascular:  Rate and Rhythm is regular    Respiratory:  Lungs are clear to auscultation in all fields bilaterally.     Abdomen: Normal bowel sounds. Soft, symmetric, no guarding or rigidity, non tender with palpation.  No organomegaly or masses palpated.     Integumentary/Hair/Nails:  Turgor and texture are diminished.  Nails are normal.    Edema: None    Ulceration(s)/Wound(s):     Left Forearm Ulceration(s):     Wound bed: %100 loose slough          Undermining no, Tunneling no   Wound Edge: Attached.   Periwound:  There is no wendy wound erythema, induration, maceration, denudement fluctuance or warmth surrounding the ulcer(s).  Exudate: minimal sanguaneous    Odor:  absent   Wound Shape irregular    Laboratory studies:     Lab Results   Component Value Date    WBC 9.4 10/10/2017    HGB 12.4 10/10/2017    HCT 37.1 10/10/2017     10/10/2017     10/10/2017     Lab Results   Component Value Date    CREATININE 1.00 10/05/2017     Lab Results   Component Value Date    BUN 35 (H) 10/05/2017     Lab Results   Component Value Date    CRP 2.1 (H) 10/17/2017     Lab Results   Component Value Date    SEDRATE 74 (H) 10/17/2017     Lab Results   Component Value Date    ALBUMIN 3.8 06/12/2017     Lab Results   Component Value Date    HGBA1C 7.7 (H) 10/18/2016       Lab Results   Component Value Date    TSH 2.93 10/22/2015         Lab Results   Component Value Date     10/05/2017     Results for orders placed or performed in visit on 10/05/17   Basic Metabolic Panel   Result Value Ref Range    Sodium 139 136 - 145  mmol/L    Potassium 4.4 3.5 - 5.0 mmol/L    Chloride 104 98 - 107 mmol/L    CO2 23 22 - 31 mmol/L    Anion Gap, Calculation 12 5 - 18 mmol/L    Glucose 231 (H) 70 - 125 mg/dL    Calcium 9.8 8.5 - 10.5 mg/dL    BUN 35 (H) 8 - 28 mg/dL    Creatinine 1.00 0.60 - 1.10 mg/dL    GFR MDRD Af Amer >60 >60 mL/min/1.73m2    GFR MDRD Non Af Amer 53 (L) >60 mL/min/1.73m2      Results for orders placed or performed in visit on 06/12/17   Comprehensive Metabolic Panel   Result Value Ref Range    Sodium 139 136 - 145 mmol/L    Potassium 3.9 3.5 - 5.0 mmol/L    Chloride 102 98 - 107 mmol/L    CO2 24 22 - 31 mmol/L    Anion Gap, Calculation 13 5 - 18 mmol/L    Glucose 198 (H) 70 - 125 mg/dL    BUN 35 (H) 8 - 28 mg/dL    Creatinine 1.09 0.60 - 1.10 mg/dL    GFR MDRD Af Amer 58 (L) >60 mL/min/1.73m2    GFR MDRD Non Af Amer 48 (L) >60 mL/min/1.73m2    Bilirubin, Total 0.4 0.0 - 1.0 mg/dL    Calcium 9.7 8.5 - 10.5 mg/dL    Protein, Total 7.4 6.0 - 8.0 g/dL    Albumin 3.8 3.5 - 5.0 g/dL    Alkaline Phosphatase 107 45 - 120 U/L    AST 42 (H) 0 - 40 U/L    ALT 45 0 - 45 U/L     No results found for: [id number]   No results found for: [id number]    Imaging:  None pertinent     Assessment:  1. Skin tear of left elbow without complication, subsequent encounter     2. Diabetes mellitus     3. Chronic diastolic heart failure     4. DM (diabetes mellitus)         Assessment/Plan:    1.  Debridement of the arm ulcer was recommended.  After consent was obtained and topical 2% Xylocaine was applied under clean conditions, and using a #15 blade,the devitalized dermal tissue was debrided for a total square centimeters of 1.5. Following debridement, there was a decrease in the nonviable tissue. The patient tolerated the procedure without any difficulty.      2. Arm skin tear, secondary to fall.  No signs of improvement.  Evidence of improvement.    3.  Treatment Will apply layers of viscopaste over the open area and cover with an ABD.  It will be  secured with a roll gauze.  This can be changed every few days.  Her daughter is a RN and will be changing the dressing.  She was instructed to return to clinic if drainage persists after two weeks.    4.  Patient to return to clinic in as needed for reevaluation.     Nicki Robertson, APRN, CNP, [facility]  [phone]

## 2021-06-20 NOTE — PROGRESS NOTES
MTM Transition of Care Encounter  Assessment & Plan                                                       1. Chronic ulcerative colitis with rectal bleeding, unspecified location (H)  Bleeding resolved. All medication changes were appropriately made for dicharge  -Continue prednisone as per GI    2. Diabetes mellitus, type II, insulin dependent (H)  A1c is at goal (<8% based on age). Hyperglycemia related to steroid use.   -Continue current therapy. If BGs remain labile, could consider change from NPH to a different long acting (Lantus) with less peak effect, therefore, would expect more consistent control.   -Bring glucometer to PCP visit today     3. Chronic atrial fibrillation (H)  Not on rate/rhythm agent (pulse is wnl). Appropriately no longer on anticoagulation. Continue current therapy    4. Urinary tract infection  No longer symptomatic  -Patient to finish course of Keflex    Follow Up  PRN        Subjective & Objective                                                       Ani Castillo is a 85 y.o. female called for a transitions of care visit. she was discharged from [facility] on [facility]/11/18 for GI bleed and UTI. I contacted patient's daughter, Lisa, per verbal authorization from Ani. Lisa has been managing medications since hospital discharge.     Medication changes for discharge:   -New: Aspirin 81 mg/day, Keflex (UTI), NPH  -Stopped: glimepiride, warfarin  -Prednisone continued per GI  Lisa states that Ani has had no further bleeding or urinary symptoms since discharge. Keflex to end on Sunday evening. No adverse effects or concerns. They made all appropriate medication changes for discharge.    Experiencing hyperglycemia related to prednisone and discharged on NPH insulin. Lisa is going to her mom's every day to give the injection. She is now on NPH 28 units every AM. Has sliding scale novolog as well. Yesterday AM fasting was 93 - patient was symptomatic and corrected with food. Today was  193 fasting. Yesterday BG was 382 prior to dinner.     They have follow up scheduled with GI.     No other changes were made to remainder of meds. No concerns noted.     PMH: reviewed in EPIC   Allergies/ADRs: reviewed in EPIC   Alcohol: reviewed in EPIC   Tobacco:   History   Smoking Status     Never Smoker   Smokeless Tobacco     Never Used     Recent Vitals:   BP Readings from Last 3 Encounters:   09/11/18 133/77   09/07/18 104/68   09/03/18 102/58      Wt Readings from Last 3 Encounters:   09/09/18 197 lb (89.4 kg)   08/29/18 205 lb (93 kg)   08/27/18 208 lb 4 oz (94.5 kg)     Pulse Readings from Last 3 Encounters:   09/11/18 89   09/07/18 80   09/03/18 87       ----------------    Much or all of the text in this note was generated through the use of Dragon Dictate voice-to-text software. Errors in spelling or words which seem out of context are unintentional. Sound alike errors, in particular, may have escaped editing.    The patient declined an after visit summary    I spent 15 minutes with this patient today;  . I offer these suggestions for consideration by the PCP. A copy of the visit note was provided to the patient's provider.     Cindy Benson, PharmD, BCACP  MTM Pharmacist   [facility]           Current Outpatient Prescriptions   Medication Sig Dispense Refill     acetaminophen (ACETAMINOPHEN EXTRA STRENGTH) 500 MG tablet Take 1,000 mg by mouth 2 (two) times a day as needed.        allopurinol (ZYLOPRIM) 100 MG tablet Take 200 mg by mouth daily.       aspirin 81 mg chewable tablet Chew 81 mg daily.       blood sugar diagnostic (GLUCOSE BLOOD) Strp Use As Directed. OneTouch Ultra Blue In Vitro Strip.       calcium carbonate-vitamin D3 (CALCIUM 600 + D,3,) 600 mg(1,500mg) -400 unit per tablet Take 1 tablet by mouth daily.       cephalexin (KEFLEX) 500 MG capsule Take 1 capsule (500 mg total) by mouth 2 (two) times a day for 10 doses. 10 capsule 0     escitalopram oxalate  (LEXAPRO) 10 MG tablet Take 10 mg by mouth daily.       ferrous sulfate 325 (65 FE) MG tablet Take 1 tablet by mouth daily with breakfast.       HUMULIN N NPH INSULIN KWIKPEN 100 unit/mL (3 mL) pen Inject 24 Units under the skin daily before breakfast. Give 24 units subcutaneously every morning with 30 mg of prednisone  Give 18 units subcutaneously every morning with 20 mg of prednisone  Give 12 units subcutaneously every morning with 10 mg of prednisone    Could increase NPH by 2 units if pre-supper glucose is greater than 180 on the previous day. Call PCP for questions.     11.65 Type 2 with hyperglycemia 3 mL PRN     insulin lispro (HUMALOG) 100 unit/mL injection Inject 2-10 Units under the skin 3 (three) times a day before meals. Sliding scale (from [facility] records)  150-200 2 units  200-250 4 units  Up to 10 units if > 350       Lactobacillus rhamnosus GG (CULTURELLE) 10-15 Billion cell capsule Take 1 capsule by mouth daily.       MULTIVITAMIN ORAL Take 1 tablet by mouth daily. TABS.       omeprazole (PRILOSEC) 20 MG capsule Take 1 capsule (20 mg total) by mouth daily before breakfast. 30 capsule 0     potassium chloride 20 mEq TbER Take 20 mEq by mouth 2 (two) times a day.        predniSONE (DELTASONE) 10 mg tablet Take 3 tablets (30 mg total) by mouth daily with breakfast for 14 days. 42 tablet 0     simvastatin (ZOCOR) 20 MG tablet Take 20 mg by mouth bedtime.        vedolizumab (ENTYVIO) 300 mg SolR injection Infuse 300 mg into a venous catheter every 30 (thirty) days.        No current facility-administered medications for this encounter.

## 2021-06-20 NOTE — ANESTHESIA PREPROCEDURE EVALUATION
Anesthesia Evaluation      Patient summary reviewed   No history of anesthetic complications     Airway   Mallampati: III   Pulmonary - normal exam   (+) shortness of breath, sleep apnea on CPAP, ,                          Cardiovascular - normal exam  Exercise tolerance: > or = 4 METS  (+) hypertension well controlled, CAD, dysrhythmias (afib), CHF, , hypercholesterolemia,     ECG reviewed (Afib)  Rhythm: irregular  Rate: normal,      ROS comment: TTE   Summary   Normal left ventricular cavity size and systolic function.   Left ventricular ejection fraction is visually estimated to be 65 %.   Mild tricuspid regurgitation.   Normal right ventricular systolic pressure.   Biatrial enlargement.     Neuro/Psych - negative ROS     Endo/Other    (+) diabetes mellitus type 2 well controlled, obesity,      GI/Hepatic/Renal    (+)   chronic renal disease,      Other findings:  Atrial fibrillation (H)      Benign essential hypertension      Cardiomyopathy (H)      CHF (congestive heart failure) (H)      Chronic kidney disease      Chronic Kidney Disease (NKF Classification)      Colitis      Diabetes mellitus (H)      Diastolic Congestive Heart Failure      DM (diabetes mellitus) (H)      GI bleed 10/18/2016    Gout      Hyperlipidemia      Hyperlipidemia      Hypertension      Morbid obesity (H)      MALISSA (obstructive sleep apnea)      Persistent atrial fibrillation (H)      PRB (rectal bleeding)      Shortness of breath      Sleep apnea   Results for MARIPOSA MANCILLA (MRN [id number]) as of 10/6/2018 06:58    10/5/2018 16:02  Sodium: 141  Potassium: 3.1 (L)  Chloride: 101  CO2: 28  Anion Gap, Calculation: 12  BUN: 23  Creatinine: 1.09  GFR MDRD Af Amer: 58 (L)  GFR MDRD Non Af Amer: 48 (L)  Calcium: 9.9  Results for MARIPOSA MANCILLA (MRN [id number]) as of 10/6/2018 06:58    10/5/2018 16:02  WBC: 13.2 (H)  RBC: 3.42 (L)  Hemoglobin: 10.9 (L)  Hematocrit: 33.8 (L)  MCV: 99  MCH: 31.9  MCHC: 32.2  RDW: 15.4 (H)  Platelets:  220    10/5/2018 16:03  INR: 1.10  PTT: 23 (L)          Dental - normal exam                        Anesthesia Plan  Planned anesthetic: general endotracheal  GAETT  RSI  Glidescope  Possible Moreno  Surgeon favors TAP block if possible, but not epidural  Soft bite block    ASA 3     Anesthetic plan and risks discussed with: patient, spouse and child/children  Anesthesia plan special considerations: video-assisted, antiemetics, IV therapy two IVs,   Post-op plan: routine recovery and extended intubation/vent support

## 2021-06-20 NOTE — ANESTHESIA CARE TRANSFER NOTE
Last vitals:   Vitals:    10/06/18 1236   BP: (!) 226/107   Pulse: 88   Resp: 17   Temp: 36.1  C (97  F)   SpO2: 100%     Patient's level of consciousness is drowsy  Spontaneous respirations: yes  Maintains airway independently: yes  Dentition unchanged: yes  Oropharynx: oropharynx clear of all foreign objects    QCDR Measures:  ASA# 20 - Surgical Safety Checklist: WHO surgical safety checklist completed prior to induction  PQRS# 430 - Adult PONV Prevention: 4558F - Pt received => 2 anti-emetic agents (different classes) preop & intraop  ASA# 8 - Peds PONV Prevention: NA - Not pediatric patient, not GA or 2 or more risk factors NOT present  PQRS# 424 - Zainab-op Temp Management: 4559F - At least one body temp DOCUMENTED => 35.5C or 95.9F within required timeframe  PQRS# 426 - PACU Transfer Protocol: - Transfer of care checklist used  ASA# 14 - Acute Post-op Pain: ASA14B - Patient did NOT experience pain >= 7 out of 10

## 2021-06-20 NOTE — ANESTHESIA PROCEDURE NOTES
Peripheral IV start    Reason for Procedure: Volume depletion  Staffing:  Performing  Anesthesiologist: KIRA DURAN  IV:   Laterality: left  Location: forearm      Needle gauge: 14 G  Number of Attempts: 1  Secured with: tape and transparent dressing  Flushed with: saline  Ultrasound evaluation of access site: yes    Additional Notes  Placed in or after induction with ultrasound guidance

## 2021-06-20 NOTE — ANESTHESIA POSTPROCEDURE EVALUATION
Patient: Ani Castillo  TOTAL ABDOMINAL COLECTOMY, OMENTECTOMY, CREATION, ILEOSTOMY, OPEN  Anesthesia type: general    Patient location: PACU  Last vitals:   Vitals:    10/06/18 1422   BP: 160/76   Pulse: 77   Resp: 14   Temp:    SpO2: 99%     Post vital signs: stable  Level of consciousness: awake and responds to simple questions  Post-anesthesia pain: pain controlled  Post-anesthesia nausea and vomiting: no  Pulmonary: unassisted, return to baseline  Cardiovascular: stable and blood pressure at baseline  Hydration: adequate  Anesthetic events: no    QCDR Measures:  ASA# 11 - Zainab-op Cardiac Arrest: ASA11B - Patient did NOT experience unanticipated cardiac arrest  ASA# 12 - Zaniab-op Mortality Rate: ASA12B - Patient did NOT die  ASA# 13 - PACU Re-Intubation Rate: ASA13B - Patient did NOT require a new airway mgmt  ASA# 10 - Composite Anes Safety: ASA10A - No serious adverse event    Additional Notes:

## 2021-06-20 NOTE — ANESTHESIA PROCEDURE NOTES
Peripheral Block    Patient location during procedure: pre-op  Start time: 10/6/2018 12:10 PM  End time: 10/6/2018 12:17 PM  post-op analgesia per surgeon order as noted in medical record  Staffing:  Performing  Anesthesiologist: LICHA AVILES  Preanesthetic Checklist  Completed: patient identified, site marked, risks, benefits, and alternatives discussed, timeout performed, consent obtained, airway assessed, oxygen available, suction available, emergency drugs available and hand hygiene performed  Peripheral Block  Block type: other, TAP  Prep: ChloraPrep  Patient position: supine  Patient monitoring: cardiac monitor, continuous pulse oximetry, heart rate and blood pressure  Laterality: bilateral, same technique used bilaterally  Injection technique: ultrasound guided            Needle  Needle type: Stimuplex   Needle gauge: 21 G  Needle length: 6 in  no peripheral nerve catheter placed  Assessment  Injection assessment: no difficulty with injection, negative aspiration for heme, no paresthesia on injection and incremental injection

## 2021-06-21 NOTE — PROGRESS NOTES
Code Status:  FULL CODE  Visit Type: Problem Visit     Facility:  [facility] [[id number]]         Facility Type: SNF (Skilled Nursing Facility, TCU)    History of Present Illness: Ani Castillo is a 85 y.o. female who I am seeing today for follow up on the TCU. Pt recently hospitalized on 10/2/18-10/12/18 s/p total abdominal colectomy, omentectomy, creation and ileostomy open on 10/6.  Patient with history of ulcerative colitis, type 2 diabetes mellitus, MALISSA, diastolic CHF, persistent A. fib admitted for sigmoid colon obstruction in setting of known ulcerative colitis.  Patient initially treated with IV Zosyn and discontinue on 10/9.  Leukocytosis secondary to steroid noted during hospitalization.  Initially Improving with low pro-calcitonin level.  Patient was discharged with tapering dose of prednisone per GI per previous hospitalist.  Patient was seen by Dr. Grissom over the weekend.  There was concerns for low blood pressure.  Systolics in the 80s.  Her metoprolol was held.  She was also treated for fluid repletion.  BUN and creatinine 1.52.  She was given a liter of fluid. She has completed this. DM on insulin.     Today pt sitting up in bedside chair. She denies any pain in her abdomen. She did receive 2 L of NS. Her kidney function has returned to normal limits. She was treated for UTI with Cipro. She has completed her antibiotic therapy. She is a-febrile. She denies any dysuria. Her ileostomy is patent. Hx of leukocytosis. This did improve with antibiotic therapy. However yesterday her WBC is back up to 13.7. Incision dry and intact with ST. Incision is slightly red at the base due to staples needing to come out. No drainage.       Active Ambulatory Problems     Diagnosis Date Noted     DM (diabetes mellitus) (H)      Chronic Kidney Disease (NKF Classification)      Benign essential hypertension      Persistent atrial fibrillation (H)      Chronic diastolic CHF (congestive heart failure)  (H)      Sleep apnea      Shortness Of Breath      Hyperlipidemia      GI bleed 10/18/2016     PRB (rectal bleeding)      Anemia due to blood loss, acute      Polyarthralgia 10/10/2017     Primary osteoarthritis involving multiple joints 12/08/2017     Inflammatory polyarthritis (H) 12/08/2017     Chondrocalcinosis 12/08/2017     Insulin dependent diabetes mellitus with complications (H)      Coronary artery disease involving native coronary artery without angina pectoris 04/11/2018     Ulcerative colitis (H) 08/23/2018     Hyperglycemia      Chronic ulcerative colitis with rectal bleeding (H) 09/07/2018     Bloody diarrhea      Lightheadedness      Chronic atrial fibrillation (H)      Diastolic dysfunction      Diabetes mellitus, type II, insulin dependent (H)      History of coronary artery disease      Colon obstruction (H) 10/05/2018     Hypokalemia      Skin tear of forearm without complication, left, initial encounter      Hypomagnesemia      Postprocedural hypotension      Resolved Ambulatory Problems     Diagnosis Date Noted     Supratherapeutic INR      Crohn's disease with complication, unspecified gastrointestinal tract location (H)      Past Medical History:   Diagnosis Date     Atrial fibrillation (H)      Benign essential hypertension      Cardiomyopathy (H)      CHF (congestive heart failure) (H)      Chronic kidney disease      Chronic Kidney Disease (NKF Classification)      Colitis      Diabetes mellitus (H)      Diastolic Congestive Heart Failure      DM (diabetes mellitus) (H)      GI bleed 10/18/2016     Gout      Hyperlipidemia      Hyperlipidemia      Hypertension      Morbid obesity (H)      MALISSA (obstructive sleep apnea)      Persistent atrial fibrillation (H)      PRB (rectal bleeding)      Shortness of breath      Sleep apnea      SOB (shortness of breath)      Urinary incontinence        Current Outpatient Prescriptions   Medication Sig     acetaminophen (ACETAMINOPHEN EXTRA STRENGTH) 500  MG tablet Take 1,000 mg by mouth 2 (two) times a day as needed.      aspirin 81 mg chewable tablet Chew 81 mg daily.     blood sugar diagnostic (GLUCOSE BLOOD) Strp Use As Directed. OneTouch Ultra Blue In Vitro Strip.     escitalopram oxalate (LEXAPRO) 10 MG tablet Take 10 mg by mouth daily.     ferrous sulfate 325 (65 FE) MG tablet Take 1 tablet by mouth daily with breakfast.     insulin lispro (HUMALOG) 100 unit/mL injection Inject 2-10 Units under the skin 3 (three) times a day before meals. Sliding scale (from [facility] records)  150-200 2 units  200-250 4 units  Up to 10 units if > 350     insulin NPH (NOVOLIN) 100 unit/mL injection Inject 16 Units under the skin 2 (two) times a day.     Lactobacillus rhamnosus GG (CULTURELLE) 10-15 Billion cell capsule Take 1 capsule by mouth daily.     omeprazole (PRILOSEC) 20 MG capsule Take 1 capsule (20 mg total) by mouth daily before breakfast.     predniSONE (DELTASONE) 10 mg tablet 2 tabs daily for 3 days and 1 tab daily for 10 days.     simvastatin (ZOCOR) 20 MG tablet Take 20 mg by mouth bedtime.        Allergies   Allergen Reactions     Metformin Other (See Comments)     GI symptoms         Review of Systems   No fevers or chills. No headache, lightheadedness or dizziness. No SOB, chest pains or palpitations. Appetite is good. No nausea, vomiting, constipation or diarrhea. She has ileostomy in place which is patent. No dysuria, frequency, burning or pain with urination.She does report urinary frequency. Denies any pain.  Otherwise review of systems are negative.       Physical Exam   PHYSICAL EXAMINATION:  Vital signs: /84, heart rate 74, respirations 16, temperature 97.4, O2 sat 100% on room air.  Current weight 189 pounds.  General: Awake, Alert, oriented x3, appropriately, follows simple commands, conversant  HEENT:PERRLA, Pink conjunctiva, anicteric sclerae, moist oral mucosa  NECK: Supple, without any lymphadenopathy, or masses  CVS:  S1  S2, without  murmur or gallop.   LUNG: Clear to auscultation, No wheezes, rales or rhonci.  BACK: No kyphosis of the thoracic spine  ABDOMEN: Soft,round,  nontender to palpation, with positive bowel sounds. Ileostomy patent.  Incision to the midline intact with staples.  No pain with palpation.No signs or symptoms of infection.   EXTREMITIES: Good range of motion on both upper and lower extremities with generalized weakness, trace pedal edema, no calf tenderness  SKIN: Warm and dry, no rashes or erythema noted  NEUROLOGIC: Intact, pulses palpable  PSYCHIATRIC: Cognition intact      Labs:    Recent Results (from the past 240 hour(s))   Electrolyte Profile   Result Value Ref Range    Sodium 137 136 - 145 mmol/L    Potassium 5.5 (H) 3.5 - 5.0 mmol/L    CO2 28 22 - 31 mmol/L    Chloride 99 98 - 107 mmol/L    Anion Gap, Calculation 10 5 - 18 mmol/L   Magnesium   Result Value Ref Range    Magnesium 2.2 1.8 - 2.6 mg/dL   Renal Function Profile   Result Value Ref Range    Albumin 3.2 (L) 3.5 - 5.0 g/dL    Calcium 10.6 (H) 8.5 - 10.5 mg/dL    Phosphorus 4.1 2.5 - 4.5 mg/dL    Glucose 204 (H) 70 - 125 mg/dL    BUN 34 (H) 8 - 28 mg/dL    Creatinine 1.57 (H) 0.60 - 1.10 mg/dL    Sodium 137 136 - 145 mmol/L    Potassium 5.5 (H) 3.5 - 5.0 mmol/L    Chloride 99 98 - 107 mmol/L    CO2 28 22 - 31 mmol/L    Anion Gap, Calculation 10 5 - 18 mmol/L    GFR MDRD Af Amer 38 (L) >60 mL/min/1.73m2    GFR MDRD Non Af Amer 31 (L) >60 mL/min/1.73m2   HM1 (CBC with Diff)   Result Value Ref Range    WBC 19.1 (H) 4.0 - 11.0 thou/uL    RBC 3.60 (L) 3.80 - 5.40 mill/uL    Hemoglobin 11.3 (L) 12.0 - 16.0 g/dL    Hematocrit 37.0 35.0 - 47.0 %     (H) 80 - 100 fL    MCH 31.4 27.0 - 34.0 pg    MCHC 30.5 (L) 32.0 - 36.0 g/dL    RDW 14.9 (H) 11.0 - 14.5 %    Platelets 308 140 - 440 thou/uL    MPV 10.0 8.5 - 12.5 fL   Basic Metabolic Panel   Result Value Ref Range    Sodium 138 136 - 145 mmol/L    Potassium 5.5 (H) 3.5 - 5.0 mmol/L    Chloride 99 98 - 107  mmol/L    CO2 28 22 - 31 mmol/L    Anion Gap, Calculation 11 5 - 18 mmol/L    Glucose 201 (H) 70 - 125 mg/dL    Calcium 10.5 8.5 - 10.5 mg/dL    BUN 34 (H) 8 - 28 mg/dL    Creatinine 1.59 (H) 0.60 - 1.10 mg/dL    GFR MDRD Af Amer 37 (L) >60 mL/min/1.73m2    GFR MDRD Non Af Amer 31 (L) >60 mL/min/1.73m2   Manual Differential   Result Value Ref Range    Total Neutrophils % 82 (H) 50 - 70 %    Lymphocytes % 5 (L) 20 - 40 %    Monocytes % 7 2 - 10 %    Eosinophils %  3 0 - 6 %    Basophils % 0 0 - 2 %    Metamyelocytes % 3 (H) <=1 %    Myelocytes % 1 <=1 %    Total Neutrophils Absolute 15.6 (H) 2.0 - 7.7 thou/ul    Lymphocytes Absolute 1.0 0.8 - 4.4 thou/uL    Monocytes Absolute 1.3 (H) 0.0 - 0.9 thou/uL    Eosinophils Absolute 0.6 (H) 0.0 - 0.4 thou/uL    Basophils Absolute 0.0 0.0 - 0.2 thou/uL    Metamyelocytes Absolute 0.6 (H) <=0.1 thou/uL    Myelocytes Absolute 0.1 <=0.1 thou/uL    Manual nRBC per 100 Cells 1 (H) 0-<1    Platelet Estimate Normal Normal    Ovalocytes 1+ (!) Negative    Polychromasia 1+ (!) Negative    Tear Drop Cells 1+ (!) Negative   Basic Metabolic Panel   Result Value Ref Range    Sodium 132 (L) 136 - 145 mmol/L    Potassium 4.7 3.5 - 5.0 mmol/L    Chloride 100 98 - 107 mmol/L    CO2 22 22 - 31 mmol/L    Anion Gap, Calculation 10 5 - 18 mmol/L    Glucose 360 (H) 70 - 125 mg/dL    Calcium 9.4 8.5 - 10.5 mg/dL    BUN 35 (H) 8 - 28 mg/dL    Creatinine 1.63 (H) 0.60 - 1.10 mg/dL    GFR MDRD Af Amer 36 (L) >60 mL/min/1.73m2    GFR MDRD Non Af Amer 30 (L) >60 mL/min/1.73m2   Urinalysis-UC if Indicated   Result Value Ref Range    Color, UA Yellow Colorless, Yellow, Straw, Light Yellow    Clarity, UA Turbid (!) Clear    Glucose,  mg/dL (!) Negative    Bilirubin, UA Negative Negative    Ketones, UA Negative Negative    Specific Gravity, UA 1.025 1.001 - 1.030    Blood, UA Small (!) Negative    pH, UA 5.5 4.5 - 8.0    Protein, UA 50 mg/dL (!) Negative mg/dL    Urobilinogen, UA <2.0 E.U./dL <2.0  E.U./dL, 2.0 E.U./dL    Nitrite, UA Negative Negative    Leukocytes, UA Large (!) Negative    Bacteria, UA Many (!) None Seen hpf    RBC, UA 0-2 None Seen, 0-2 hpf    WBC, UA >100 (!) None Seen, 0-5 hpf    Squam Epithel, UA 0-5 None Seen, 0-5 lpf    WBC Clumps Present (!) None Seen    Mucus, UA Few (!) None Seen lpf   Culture, Urine   Result Value Ref Range    Culture >100,000 col/ml Klebsiella pneumoniae (!)        Susceptibility    Klebsiella pneumoniae - EVELIO     Amoxicillin + Clavulanate <=4/2 Sensitive      Ampicillin 8 Resistant      Cefazolin <=1 Sensitive      Cefepime <=1 Sensitive      Ciprofloxacin <=0.5 Sensitive      Gentamicin <=2 Sensitive      Levofloxacin <=1 Sensitive      Meropenem <=0.25 Sensitive      Nitrofurantoin 64 Intermediate      Tetracycline <=2 Sensitive      Tobramycin <=2 Sensitive      Trimethoprim + Sulfamethoxazole <=0.5 Sensitive    Basic Metabolic Panel   Result Value Ref Range    Sodium 133 (L) 136 - 145 mmol/L    Potassium 5.4 (H) 3.5 - 5.0 mmol/L    Chloride 102 98 - 107 mmol/L    CO2 18 (L) 22 - 31 mmol/L    Anion Gap, Calculation 13 5 - 18 mmol/L    Glucose 366 (H) 70 - 125 mg/dL    Calcium 9.5 8.5 - 10.5 mg/dL    BUN 34 (H) 8 - 28 mg/dL    Creatinine 1.49 (H) 0.60 - 1.10 mg/dL    GFR MDRD Af Amer 40 (L) >60 mL/min/1.73m2    GFR MDRD Non Af Amer 33 (L) >60 mL/min/1.73m2   HM2(CBC w/o Differential)   Result Value Ref Range    WBC 21.1 (H) 4.0 - 11.0 thou/uL    RBC 3.28 (L) 3.80 - 5.40 mill/uL    Hemoglobin 10.3 (L) 12.0 - 16.0 g/dL    Hematocrit 32.7 (L) 35.0 - 47.0 %     80 - 100 fL    MCH 31.4 27.0 - 34.0 pg    MCHC 31.5 (L) 32.0 - 36.0 g/dL    RDW 14.8 (H) 11.0 - 14.5 %    Platelets 284 140 - 440 thou/uL    MPV 10.5 8.5 - 12.5 fL   Procalcitonin   Result Value Ref Range    Procalcitonin 0.07 0.00 - 0.49 ng/mL   Basic Metabolic Panel   Result Value Ref Range    Sodium 141 136 - 145 mmol/L    Potassium 3.6 3.5 - 5.0 mmol/L    Chloride 108 (H) 98 - 107 mmol/L    CO2 27  22 - 31 mmol/L    Anion Gap, Calculation 6 5 - 18 mmol/L    Glucose 75 70 - 125 mg/dL    Calcium 8.4 (L) 8.5 - 10.5 mg/dL    BUN 16 8 - 28 mg/dL    Creatinine 1.04 0.60 - 1.10 mg/dL    GFR MDRD Af Amer >60 >60 mL/min/1.73m2    GFR MDRD Non Af Amer 50 (L) >60 mL/min/1.73m2   Procalcitonin   Result Value Ref Range    Procalcitonin 0.06 0.00 - 0.49 ng/mL   HM1 (CBC with Diff)   Result Value Ref Range    WBC 8.7 4.0 - 11.0 thou/uL    RBC 3.18 (L) 3.80 - 5.40 mill/uL    Hemoglobin 9.5 (L) 12.0 - 16.0 g/dL    Hematocrit 30.6 (L) 35.0 - 47.0 %    MCV 96 80 - 100 fL    MCH 29.9 27.0 - 34.0 pg    MCHC 31.0 (L) 32.0 - 36.0 g/dL    RDW 15.4 (H) 11.0 - 14.5 %    Platelets 285 140 - 440 thou/uL    MPV 11.1 8.5 - 12.5 fL    Neutrophils % 56 50 - 70 %    Lymphocytes % 24 20 - 40 %    Monocytes % 14 (H) 2 - 10 %    Eosinophils % 4 0 - 6 %    Basophils % 1 0 - 2 %    Neutrophils Absolute 4.9 2.0 - 7.7 thou/uL    Lymphocytes Absolute 2.1 0.8 - 4.4 thou/uL    Monocytes Absolute 1.2 (H) 0.0 - 0.9 thou/uL    Eosinophils Absolute 0.4 0.0 - 0.4 thou/uL    Basophils Absolute 0.1 0.0 - 0.2 thou/uL   Basic Metabolic Panel   Result Value Ref Range    Sodium 142 136 - 145 mmol/L    Potassium 4.1 3.5 - 5.0 mmol/L    Chloride 109 (H) 98 - 107 mmol/L    CO2 22 22 - 31 mmol/L    Anion Gap, Calculation 11 5 - 18 mmol/L    Glucose 42 (LL) 70 - 125 mg/dL    Calcium 9.4 8.5 - 10.5 mg/dL    BUN 18 8 - 28 mg/dL    Creatinine 1.05 0.60 - 1.10 mg/dL    GFR MDRD Af Amer 60 (L) >60 mL/min/1.73m2    GFR MDRD Non Af Amer 50 (L) >60 mL/min/1.73m2   HM2(CBC w/o Differential)   Result Value Ref Range    WBC 13.7 (H) 4.0 - 11.0 thou/uL    RBC 2.98 (L) 3.80 - 5.40 mill/uL    Hemoglobin 9.4 (L) 12.0 - 16.0 g/dL    Hematocrit 30.3 (L) 35.0 - 47.0 %     (H) 80 - 100 fL    MCH 31.5 27.0 - 34.0 pg    MCHC 31.0 (L) 32.0 - 36.0 g/dL    RDW 15.1 (H) 11.0 - 14.5 %    Platelets 187 140 - 440 thou/uL    MPV 10.9 8.5 - 12.5 fL         Assessment/Plan:  1. Colonic  obstruction (H)     2. Ulcerative colitis (H)     3. S/P colectomy     4. Ileostomy bag changed (H)     5. Urinary tract infection     6. JOANNE (acute kidney injury) (H)     7. Leukocytosis       Patient with a history of ulcerative colitis with colon obstruction status post colectomy with ileostomy. Ileostomy patent.  She denies any abdominal pain.  Acute kidney injury secondary to hypo-tension.  She was given 2 L of IV fluids. Blood pressures greatly improved. Kidney function back to normal. Her leukocytosis did improve with antibiotics. However yestereday she has begun to trend upward. WBC 13.7. She is a-febrile. No signs or symptoms of infection. She continues on prednisone 10 mg until 10/25. Will follow up with laboratory on Thursday. DM. Low blood sugars in the am in the 60s. Mid day improving however evening blood sugars elevated in the 200s. I will adjust her insulin.           Electronically signed by: Smitha Blandon, BARNEY

## 2021-06-21 NOTE — PROGRESS NOTES
Code Status:  FULL CODE  Visit Type: Problem Visit     Facility:  [facility] [[id number]]         Facility Type: SNF (Skilled Nursing Facility, TCU)    History of Present Illness: Ani Castillo is a 85 y.o. female who I am seeing today for follow up on the TCU. Pt recently hospitalized on 10/2/18-10/12/18 s/p total abdominal colectomy, omentectomy, creation and ileostomy open on 10/6.  Patient with history of ulcerative colitis, type 2 diabetes mellitus, MALISSA, diastolic CHF, persistent A. fib admitted for sigmoid colon obstruction in setting of known ulcerative colitis.  Patient initially treated with IV Zosyn and discontinue on 10/9.  Leukocytosis secondary to steroid noted during hospitalization.  Initially Improving with low pro-calcitonin level.  Patient was discharged with tapering dose of prednisone per GI per previous hospitalist.  Patient was seen by Dr. Grissom over the weekend.  There was concerns for low blood pressure.  Systolics in the 80s.  Her metoprolol was held.  She was also treated for fluid repletion.  BUN and creatinine 1.52.  She was given a liter of fluid. She has completed this. DM on insulin.     Today pt sitting up in bedside chair.  Superior aspect of the incision dry and intact.  She does have most.  The distal aspect of her incision is moist due to moisture in her folds.  She is supposed to be receiving nystatin topical.  Ileostomy is patent.  She is eating well.  Denies any pain.      Active Ambulatory Problems     Diagnosis Date Noted     DM (diabetes mellitus) (H)      Chronic Kidney Disease (NKF Classification)      Benign essential hypertension      Persistent atrial fibrillation (H)      Chronic diastolic CHF (congestive heart failure) (H)      Sleep apnea      Shortness Of Breath      Hyperlipidemia      GI bleed 10/18/2016     PRB (rectal bleeding)      Anemia due to blood loss, acute      Polyarthralgia 10/10/2017     Primary osteoarthritis involving multiple  joints 12/08/2017     Inflammatory polyarthritis (H) 12/08/2017     Chondrocalcinosis 12/08/2017     Insulin dependent diabetes mellitus with complications (H)      Coronary artery disease involving native coronary artery without angina pectoris 04/11/2018     Ulcerative colitis (H) 08/23/2018     Hyperglycemia      Chronic ulcerative colitis with rectal bleeding (H) 09/07/2018     Bloody diarrhea      Lightheadedness      Chronic atrial fibrillation (H)      Diastolic dysfunction      Diabetes mellitus, type II, insulin dependent (H)      History of coronary artery disease      Colon obstruction (H) 10/05/2018     Hypokalemia      Skin tear of forearm without complication, left, initial encounter      Hypomagnesemia      Postprocedural hypotension      Resolved Ambulatory Problems     Diagnosis Date Noted     Supratherapeutic INR      Crohn's disease with complication, unspecified gastrointestinal tract location (H)      Past Medical History:   Diagnosis Date     Atrial fibrillation (H)      Benign essential hypertension      Cardiomyopathy (H)      CHF (congestive heart failure) (H)      Chronic kidney disease      Chronic Kidney Disease (NKF Classification)      Colitis      Diabetes mellitus (H)      Diastolic Congestive Heart Failure      DM (diabetes mellitus) (H)      GI bleed 10/18/2016     Gout      Hyperlipidemia      Hyperlipidemia      Hypertension      Morbid obesity (H)      MALISSA (obstructive sleep apnea)      Persistent atrial fibrillation (H)      PRB (rectal bleeding)      Shortness of breath      Sleep apnea      SOB (shortness of breath)      Urinary incontinence        Current Outpatient Prescriptions   Medication Sig     acetaminophen (ACETAMINOPHEN EXTRA STRENGTH) 500 MG tablet Take 1,000 mg by mouth 2 (two) times a day as needed.      aspirin 81 mg chewable tablet Chew 81 mg daily.     blood sugar diagnostic (GLUCOSE BLOOD) Strp Use As Directed. OneTouch Ultra Blue In Vitro Strip.      escitalopram oxalate (LEXAPRO) 10 MG tablet Take 10 mg by mouth daily.     ferrous sulfate 325 (65 FE) MG tablet Take 1 tablet by mouth daily with breakfast.     insulin lispro (HUMALOG) 100 unit/mL injection Inject 2-10 Units under the skin 3 (three) times a day before meals. Sliding scale (from [facility] records)  150-200 2 units  200-250 4 units  Up to 10 units if > 350     insulin NPH (NOVOLIN) 100 unit/mL injection Inject 16 Units under the skin 2 (two) times a day.     Lactobacillus rhamnosus GG (CULTURELLE) 10-15 Billion cell capsule Take 1 capsule by mouth daily.     omeprazole (PRILOSEC) 20 MG capsule Take 1 capsule (20 mg total) by mouth daily before breakfast.     predniSONE (DELTASONE) 10 mg tablet 2 tabs daily for 3 days and 1 tab daily for 10 days.     simvastatin (ZOCOR) 20 MG tablet Take 20 mg by mouth bedtime.        Allergies   Allergen Reactions     Metformin Other (See Comments)     GI symptoms         Review of Systems   No fevers or chills. No headache, lightheadedness or dizziness. No SOB, chest pains or palpitations. Appetite is good. No nausea, vomiting, constipation or diarrhea. She has ileostomy in place which is patent. No dysuria, frequency, burning or pain with urination.Urinary frequency improved.  Denies any pain.  Otherwise review of systems are negative.       Physical Exam   PHYSICAL EXAMINATION:  Vital signs: /77, heart rate 68, respirations 16, O2 sat 100% on room air.  Temperature 97.1.  Current weight 191pounds.  General: Awake, Alert, oriented x3, appropriately, follows simple commands, conversant  HEENT:PERRLA, Pink conjunctiva, anicteric sclerae, moist oral mucosa  NECK: Supple, without any lymphadenopathy, or masses  CVS:  S1  S2, without murmur or gallop.   LUNG: Clear to auscultation, No wheezes, rales or rhonci.  BACK: No kyphosis of the thoracic spine  ABDOMEN: Soft,round,  nontender to palpation, with positive bowel sounds. Ileostomy patent.  Incision to the  midline intact with SS. Distal aspect of incision red from moisture in fold.  Nursing staff report redness underneath the wafer.  EXTREMITIES: Good range of motion on both upper and lower extremities with generalized weakness, trace pedal edema, no calf tenderness  SKIN: Red in her abdominal and groin folds.   NEUROLOGIC: Intact, pulses palpable  PSYCHIATRIC: Cognition intact      Labs:    Recent Results (from the past 240 hour(s))   Basic Metabolic Panel   Result Value Ref Range    Sodium 142 136 - 145 mmol/L    Potassium 4.1 3.5 - 5.0 mmol/L    Chloride 109 (H) 98 - 107 mmol/L    CO2 22 22 - 31 mmol/L    Anion Gap, Calculation 11 5 - 18 mmol/L    Glucose 42 (LL) 70 - 125 mg/dL    Calcium 9.4 8.5 - 10.5 mg/dL    BUN 18 8 - 28 mg/dL    Creatinine 1.05 0.60 - 1.10 mg/dL    GFR MDRD Af Amer 60 (L) >60 mL/min/1.73m2    GFR MDRD Non Af Amer 50 (L) >60 mL/min/1.73m2   HM2(CBC w/o Differential)   Result Value Ref Range    WBC 13.7 (H) 4.0 - 11.0 thou/uL    RBC 2.98 (L) 3.80 - 5.40 mill/uL    Hemoglobin 9.4 (L) 12.0 - 16.0 g/dL    Hematocrit 30.3 (L) 35.0 - 47.0 %     (H) 80 - 100 fL    MCH 31.5 27.0 - 34.0 pg    MCHC 31.0 (L) 32.0 - 36.0 g/dL    RDW 15.1 (H) 11.0 - 14.5 %    Platelets 187 140 - 440 thou/uL    MPV 10.9 8.5 - 12.5 fL   Basic Metabolic Panel   Result Value Ref Range    Sodium 140 136 - 145 mmol/L    Potassium 5.2 (H) 3.5 - 5.0 mmol/L    Chloride 108 (H) 98 - 107 mmol/L    CO2 27 22 - 31 mmol/L    Anion Gap, Calculation 5 5 - 18 mmol/L    Glucose 92 70 - 125 mg/dL    Calcium 9.6 8.5 - 10.5 mg/dL    BUN 19 8 - 28 mg/dL    Creatinine 1.00 0.60 - 1.10 mg/dL    GFR MDRD Af Amer >60 >60 mL/min/1.73m2    GFR MDRD Non Af Amer 53 (L) >60 mL/min/1.73m2   HM2(CBC w/o Differential)   Result Value Ref Range    WBC 12.8 (H) 4.0 - 11.0 thou/uL    RBC 2.99 (L) 3.80 - 5.40 mill/uL    Hemoglobin 9.4 (L) 12.0 - 16.0 g/dL    Hematocrit 30.7 (L) 35.0 - 47.0 %     (H) 80 - 100 fL    MCH 31.4 27.0 - 34.0 pg    MCHC  30.6 (L) 32.0 - 36.0 g/dL    RDW 15.4 (H) 11.0 - 14.5 %    Platelets 160 140 - 440 thou/uL    MPV 10.6 8.5 - 12.5 fL   Basic Metabolic Panel   Result Value Ref Range    Sodium 138 136 - 145 mmol/L    Potassium 5.0 3.5 - 5.0 mmol/L    Chloride 109 (H) 98 - 107 mmol/L    CO2 23 22 - 31 mmol/L    Anion Gap, Calculation 6 5 - 18 mmol/L    Glucose 90 70 - 125 mg/dL    Calcium 9.7 8.5 - 10.5 mg/dL    BUN 22 8 - 28 mg/dL    Creatinine 1.23 (H) 0.60 - 1.10 mg/dL    GFR MDRD Af Amer 50 (L) >60 mL/min/1.73m2    GFR MDRD Non Af Amer 41 (L) >60 mL/min/1.73m2   HM1 (CBC with Diff)   Result Value Ref Range    WBC 10.5 4.0 - 11.0 thou/uL    RBC 2.89 (L) 3.80 - 5.40 mill/uL    Hemoglobin 9.2 (L) 12.0 - 16.0 g/dL    Hematocrit 29.5 (L) 35.0 - 47.0 %     (H) 80 - 100 fL    MCH 31.8 27.0 - 34.0 pg    MCHC 31.2 (L) 32.0 - 36.0 g/dL    RDW 15.6 (H) 11.0 - 14.5 %    Platelets 151 140 - 440 thou/uL    MPV 10.7 8.5 - 12.5 fL    Neutrophils % 76 (H) 50 - 70 %    Lymphocytes % 13 (L) 20 - 40 %    Monocytes % 8 2 - 10 %    Eosinophils % 2 0 - 6 %    Basophils % 1 0 - 2 %    Neutrophils Absolute 7.8 (H) 2.0 - 7.7 thou/uL    Lymphocytes Absolute 1.4 0.8 - 4.4 thou/uL    Monocytes Absolute 0.8 0.0 - 0.9 thou/uL    Eosinophils Absolute 0.3 0.0 - 0.4 thou/uL    Basophils Absolute 0.1 0.0 - 0.2 thou/uL         Assessment/Plan:  1. Colonic obstruction (H)     2. Ulcerative colitis (H)     3. S/P colectomy     4. Ileostomy bag changed (H)     5. JOANNE (acute kidney injury) (H)     6. Leukocytosis       Patient with a history of ulcerative colitis with colon obstruction status post colectomy with ileostomy. Ileostomy patent.  She denies any abdominal pain.  Previous leukocytosis.  She was treated for UTI.  White count now within normal limits.  She has been weaned off her prednisone.  Moisture in her folds secondary to yeast.  Nystatin topically was ordered.  We will also place ABD pads to help with away moisture.  Nursing staff report redness  under her wafer.  Will order stoma paste with each dressing change.  Patient has been teaching of emptying colostomy.  Family is also been shown.  BMP reviewed today.  Creatinine creeping up again to 1.26.  Will add additional 240 cc of fluid each med pass.          Electronically signed by: Smitha Blandon CNP

## 2021-06-21 NOTE — PROGRESS NOTES
[facility]      Facility:    [facility] [[id number]]  Code Status: UNKNOWN      Chief Complaint/Reason for Visit:  Chief Complaint   Patient presents with     H & P     Colonic obstruction secondary to ulcerative colitis, benign essential hypertension, persistent atrial fibrillation, chronic diastolic CHF, status post open colectomy, omentectomy, ileostomy.  Pain management, low magnesium levels, low potassium levels, postprocedure hypotension.       HPI:   Ani is a 85 y.o. female who was recently admitted to the hospital on 10/5/2018.  She does have a history of ulcerative colitis according to the notes of about 4 years.  She did fail Remicade treatment and has been referred to been refractory to conservative management.  She was then admitted to the hospital on the above date with the signs and symptoms of 5 days history of no gas and no bowel movement.  She was brought to the hospital was found to have a colonic obstruction.  She was seen by surgery and did have a consultation and was brought to the hospital underwent open abdominal colectomy, omentectomy, ileostomy.  And this was done open.  She was treated with IV Zosyn and she did have a leukocytosis likely secondary secondary to steroids.  There is no signs of any infection given that the pro calcitonin level was low.  She was treated appropriately did recover and then was transferred here to the TCU at [place] in stable condition.    Over the weekend there was concerns of low blood pressure systolic blood pressures in the 60s to the 80s.  Metoprolol was held and her blood pressure has improved.  During her hospitalization she did not seem to be fluid depleted given the fact that her BUN and creatinine seem to be okay at this time.  But we will continue to monitor her fluid status.    Main concerns today with her visits patient is doing very well and does not have any pain issues.  Ileostomy seems to be  working well incision is healing very well without any signs of infection.  However she has had some weight loss and I am concerned given that the colon has been removed that she could have some hydration issues at this time.  She is eating and drinking okay at this time however we are going to assess her for volume depletion.  Her leukocytosis is likely due to steroid use at this time and her hypotension may be volume status.  I did speak with her daughter Lisa and she indicated to me that patient has had a bad reaction to beta-blockers in the past.  This was in the form of bradycardia which may have been dose related.    Past Medical History:  Past Medical History:   Diagnosis Date     Atrial fibrillation (H)      Benign essential hypertension     Created by Conversion      Cardiomyopathy (H)      CHF (congestive heart failure) (H)      Chronic kidney disease     ckd     Chronic Kidney Disease (NKF Classification)     Created by Conversion      Colitis      Diabetes mellitus (H)      Diastolic Congestive Heart Failure     Created by Conversion  Replacement Utility updated for latest IMO load     DM (diabetes mellitus) (H)     Created by Conversion      GI bleed 10/18/2016     Gout      Hyperlipidemia      Hyperlipidemia     Created by Conversion      Hypertension      Morbid obesity (H)      MALISSA (obstructive sleep apnea)      Persistent atrial fibrillation (H)      [id number] score of 6 and on chronic warfarin Cardioversion 11/10,  7/11asymptomatic and on rate control       PRB (rectal bleeding)      Shortness of breath     Created by Conversion      Sleep apnea     uses a CPAP     SOB (shortness of breath)      Urinary incontinence            Surgical History:  Past Surgical History:   Procedure Laterality Date     BACK SURGERY       CARDIAC CATHETERIZATION  03/11/2016     ILEOSTOMY N/A 10/6/2018    Procedure: CREATION, ILEOSTOMY, OPEN;  Surgeon: Buzz Arvizu MD;  Location: [facility];  Service:       PA APPENDECTOMY      Description: Appendectomy;  Recorded: 06/26/2013;     PA ARTHROPLASTY TIBIAL PLATEAU      Description: Knee Replacement;  Recorded: 06/26/2013; 3 surgery     PA PART REMOVAL COLON W ANASTOMOSIS N/A 10/6/2018    Procedure: TOTAL ABDOMINAL COLECTOMY, OMENTECTOMY;  Surgeon: Buzz Arvizu MD;  Location: [facility] OR;  Service: General     PA SIGMOIDOSCOPY FLX DX W/COLLJ SPEC BR/WA IF PFRMD N/A 5/29/2018    Procedure: SIGMOIDOSCOPY with biopsy;  Surgeon: Simone Conner MD;  Location: [facility];  Service: Gastroenterology     SIGMOIDOSCOPY N/A 10/18/2016    Procedure: SIGMOIDOSCOPY with biopsy;  Surgeon: Angeles Bianchi MD;  Location: [facility];  Service:        Family History:   Family History   Problem Relation Age of Onset     No Medical Problems Mother      No Medical Problems Father      Breast cancer Sister      Colon cancer Sister      No Medical Problems Daughter      No Medical Problems Maternal Grandmother      No Medical Problems Maternal Grandfather      No Medical Problems Paternal Grandmother      No Medical Problems Paternal Grandfather      No Medical Problems Maternal Aunt      No Medical Problems Paternal Aunt      BRCA 1/2 Neg Hx      Cancer Neg Hx      Endometrial cancer Neg Hx      Ovarian cancer Neg Hx        Social History:    Social History     Social History     Marital status:      Spouse name: N/A     Number of children: N/A     Years of education: N/A     Social History Main Topics     Smoking status: Never Smoker     Smokeless tobacco: Never Used     Alcohol use No     Drug use: No     Sexual activity: Not Asked     Other Topics Concern     None     Social History Narrative    Lives with her .          Review of Systems   Constitutional:        Patient denies any pain fevers chills nausea vomiting diarrhea change in vision hearing taste or smell weakness one side of the chest pain shortness of breath.  She did have diplopia over  the weekend while her blood pressures were low but this has resolved.  Her ileostomy is working well she is urinating without difficulty and denies any polyphagia polydipsia polyuria depression or anxiety and there is no urgency frequency or burning with urination.  Appetite is adequate according to patient.  Remainder review of systems is negative.       Vitals:    10/15/18 0929   BP: 119/82   Pulse: 78   Resp: 16   Temp: (!) 95.8  F (35.4  C)   SpO2: 100%       Physical Exam   Constitutional: She appears well-nourished. No distress.   HENT:   Head: Normocephalic and atraumatic.   Eyes: Conjunctivae are normal. Right eye exhibits no discharge. Left eye exhibits no discharge.   Neck: Neck supple. No thyromegaly present.   Cardiovascular:   Irregularly irregular with adequate rate control.   Pulmonary/Chest: Effort normal and breath sounds normal. No respiratory distress.   Abdominal: Soft. Bowel sounds are normal. She exhibits no distension. There is no tenderness.   Linear incision is doing well at this time without any signs of infection redness or discharge or dehiscence.  The ileostomy is working well at this time.   Musculoskeletal: She exhibits no edema or tenderness.   Neurological: She is alert. No cranial nerve deficit. She exhibits normal muscle tone.   Skin: Skin is warm and dry. She is not diaphoretic.   Psychiatric: She has a normal mood and affect. Her behavior is normal.       Medication List:  Current Outpatient Prescriptions   Medication Sig     acetaminophen (ACETAMINOPHEN EXTRA STRENGTH) 500 MG tablet Take 1,000 mg by mouth 2 (two) times a day as needed.      aspirin 81 mg chewable tablet Chew 81 mg daily.     blood sugar diagnostic (GLUCOSE BLOOD) Strp Use As Directed. OneTouch Ultra Blue In Vitro Strip.     escitalopram oxalate (LEXAPRO) 10 MG tablet Take 10 mg by mouth daily.     ferrous sulfate 325 (65 FE) MG tablet Take 1 tablet by mouth daily with breakfast.     HUMULIN N NPH INSULIN KWIKPEN  100 unit/mL (3 mL) pen Inject 24 Units under the skin daily before breakfast. Give 24 units subcutaneously every morning with 30 mg of prednisone  Give 18 units subcutaneously every morning with 20 mg of prednisone  Give 12 units subcutaneously every morning with 10 mg of prednisone    Could increase NPH by 2 units if pre-supper glucose is greater than 180 on the previous day. Call PCP for questions.     11.65 Type 2 with hyperglycemia (Patient taking differently: Inject 25 Units under the skin every morning. )     insulin lispro (HUMALOG) 100 unit/mL injection Inject 2-10 Units under the skin 3 (three) times a day before meals. Sliding scale (from [facility] records)  150-200 2 units  200-250 4 units  Up to 10 units if > 350     Lactobacillus rhamnosus GG (CULTURELLE) 10-15 Billion cell capsule Take 1 capsule by mouth daily.     metoprolol tartrate (LOPRESSOR) 25 MG tablet Take 1 tablet (25 mg total) by mouth 2 (two) times a day.     omeprazole (PRILOSEC) 20 MG capsule Take 1 capsule (20 mg total) by mouth daily before breakfast.     predniSONE (DELTASONE) 10 mg tablet 2 tabs daily for 3 days and 1 tab daily for 10 days.     simvastatin (ZOCOR) 20 MG tablet Take 20 mg by mouth bedtime.        Labs: Hospital labs are as follows; white count was 13.2 and 14.1 pro calcitonin was 0.14 and 0.21.  Hemoglobin was 9.99.8, platelets were within normal limits.  Sodium was 140, potassium was 3.0-3.4, CO2 is 28, creatinine 1.08, calcium was 9.1, bilirubin was 0.6, alk phosphatase is 55, ALT was 11, AST was 11.  Magnesium was 1.7 and 1.8.      Assessment:    ICD-10-CM    1. Colonic obstruction (H) K56.609    2. S/P colectomy Z90.49    3. Ileostomy bag changed (H) Z93.2    4. Hypotension I95.9    5. Hypokalemia E87.6    6. Low magnesium levels R79.0    7. Pain management R52    8. Ulcerative colitis (H) K51.90        Plan: Plan at this time continue ileostomy cares at this time and continue keeping wound clean of infection.   Basic metabolic profile be done today stat secondary to possible fluid depletion and hypotension over the weekend.  We will change metoprolol to 12.5 mg twice daily and hold if systolic blood pressures less than 115.  Blood pressures will be taken every 8 hours manually and pulse will be taken every 8 hours manually.  CBC be done stat today secondary to leukocytosis and I will work on increasing her insulin based on her blood sugars over the next day.  I will continue to monitor above medical problems and pain is well-managed at this time magnesium was up to normal range in the hospital and patient's blood pressure stable at this time.  I will continue to monitor above medical problems no other changes to care plan at this time greater than 58 minutes was spent on this admission with greater than 50% of the time dedicated to coordination of care.        Electronically signed by: Tej Salmon DO

## 2021-06-21 NOTE — PROGRESS NOTES
Code Status:  FULL CODE  Visit Type: Problem Visit     Facility:  [facility] [[id number]]         Facility Type: SNF (Skilled Nursing Facility, TCU)    History of Present Illness: Ani Castillo is a 85 y.o. female who I am seeing today for follow up on the TCU. Pt recently hospitalized on 10/2/18-10/12/18 s/p total abdominal colectomy, omentectomy, creation and ileostomy open on 10/6.  Patient with history of ulcerative colitis, type 2 diabetes mellitus, MALISSA, diastolic CHF, persistent A. fib admitted for sigmoid colon obstruction in setting of known ulcerative colitis.  Patient initially treated with IV Zosyn and discontinue on 10/9.  Leukocytosis secondary to steroid noted during hospitalization.  Initially Improving with low pro-calcitonin level.  Patient was discharged with tapering dose of prednisone per GI per previous hospitalist.  Patient was seen by Dr. Grissom over the weekend.  There was concerns for low blood pressure.  Systolics in the 80s.  Her metoprolol was held.  She was also treated for fluid repletion.  BUN and creatinine 1.52.  She was given a liter of fluid. She has completed this.  Today she is sitting up in wheelchair.  She appears stable.  Denies any fever or chills.  Denies any dizziness.  Blood pressures are greatly improved.  Weights are stable.  No shortness of breath or chest pain.  She does continue with elevated white count.  Review of her records showed a white count of 13-19.  I did ask nursing staff to have lab add on a pro calcitonin level.  She is currently afebrile.  She is on a wean of her prednisone now 10 mg daily.  Her colostomy is patent.  She denies any abdominal pain.  She is voiding adequately however reports urinary frequency.  Blood sugars continue to be in the 300s.  Review of the records show she was on 25 units in the morning.  Currently now only on 14.    Today pt sitting up in bedside chair. She denies any pain in her abdomen. She received  additional 1L of NS. Her blood pressure is stable. Lungs CTA. She is eating well. Her ileostomy is patent. She was given 2 gm of Rocephin for UTI with leukocytosis of 21. She is a-febrile. She cultured postive for Klebsiella pneumoniae. Incision dry and intact with staple.       Active Ambulatory Problems     Diagnosis Date Noted     DM (diabetes mellitus) (H)      Chronic Kidney Disease (NKF Classification)      Benign essential hypertension      Persistent atrial fibrillation (H)      Chronic diastolic CHF (congestive heart failure) (H)      Sleep apnea      Shortness Of Breath      Hyperlipidemia      GI bleed 10/18/2016     PRB (rectal bleeding)      Anemia due to blood loss, acute      Polyarthralgia 10/10/2017     Primary osteoarthritis involving multiple joints 12/08/2017     Inflammatory polyarthritis (H) 12/08/2017     Chondrocalcinosis 12/08/2017     Insulin dependent diabetes mellitus with complications (H)      Coronary artery disease involving native coronary artery without angina pectoris 04/11/2018     Ulcerative colitis (H) 08/23/2018     Hyperglycemia      Chronic ulcerative colitis with rectal bleeding (H) 09/07/2018     Bloody diarrhea      Lightheadedness      Chronic atrial fibrillation (H)      Diastolic dysfunction      Diabetes mellitus, type II, insulin dependent (H)      History of coronary artery disease      Colon obstruction (H) 10/05/2018     Hypokalemia      Skin tear of forearm without complication, left, initial encounter      Hypomagnesemia      Postprocedural hypotension      Resolved Ambulatory Problems     Diagnosis Date Noted     Supratherapeutic INR      Crohn's disease with complication, unspecified gastrointestinal tract location (H)      Past Medical History:   Diagnosis Date     Atrial fibrillation (H)      Benign essential hypertension      Cardiomyopathy (H)      CHF (congestive heart failure) (H)      Chronic kidney disease      Chronic Kidney Disease (NKF  Classification)      Colitis      Diabetes mellitus (H)      Diastolic Congestive Heart Failure      DM (diabetes mellitus) (H)      GI bleed 10/18/2016     Gout      Hyperlipidemia      Hyperlipidemia      Hypertension      Morbid obesity (H)      MALISSA (obstructive sleep apnea)      Persistent atrial fibrillation (H)      PRB (rectal bleeding)      Shortness of breath      Sleep apnea      SOB (shortness of breath)      Urinary incontinence        Current Outpatient Prescriptions   Medication Sig     acetaminophen (ACETAMINOPHEN EXTRA STRENGTH) 500 MG tablet Take 1,000 mg by mouth 2 (two) times a day as needed.      aspirin 81 mg chewable tablet Chew 81 mg daily.     blood sugar diagnostic (GLUCOSE BLOOD) Strp Use As Directed. OneTouch Ultra Blue In Vitro Strip.     escitalopram oxalate (LEXAPRO) 10 MG tablet Take 10 mg by mouth daily.     ferrous sulfate 325 (65 FE) MG tablet Take 1 tablet by mouth daily with breakfast.     insulin lispro (HUMALOG) 100 unit/mL injection Inject 2-10 Units under the skin 3 (three) times a day before meals. Sliding scale (from [facility] clinic records)  150-200 2 units  200-250 4 units  Up to 10 units if > 350     insulin NPH (NOVOLIN) 100 unit/mL injection Inject 16 Units under the skin 2 (two) times a day.     Lactobacillus rhamnosus GG (CULTURELLE) 10-15 Billion cell capsule Take 1 capsule by mouth daily.     metoprolol tartrate (LOPRESSOR) 25 MG tablet Take 1 tablet (25 mg total) by mouth 2 (two) times a day. (Patient taking differently: Take 12.5 mg by mouth 2 (two) times a day. )     omeprazole (PRILOSEC) 20 MG capsule Take 1 capsule (20 mg total) by mouth daily before breakfast.     predniSONE (DELTASONE) 10 mg tablet 2 tabs daily for 3 days and 1 tab daily for 10 days.     simvastatin (ZOCOR) 20 MG tablet Take 20 mg by mouth bedtime.        Allergies   Allergen Reactions     Metformin Other (See Comments)     GI symptoms         Review of Systems   No fevers or chills. No  headache, lightheadedness or dizziness. No SOB, chest pains or palpitations. Appetite is good. No nausea, vomiting, constipation or diarrhea. She has ileostomy in place which is patent. No dysuria, frequency, burning or pain with urination.She does report urinary frequency. Denies any pain.  Otherwise review of systems are negative.       Physical Exam   PHYSICAL EXAMINATION:  Vital signs: /84, heart rate 74, respirations 16, temperature 97.4, O2 sat 100% on room air.  Current weight 189 pounds.  General: Awake, Alert, oriented x3, appropriately, follows simple commands, conversant  HEENT:PERRLA, Pink conjunctiva, anicteric sclerae, moist oral mucosa  NECK: Supple, without any lymphadenopathy, or masses  CVS:  S1  S2, without murmur or gallop.   LUNG: Clear to auscultation, No wheezes, rales or rhonci.  BACK: No kyphosis of the thoracic spine  ABDOMEN: Soft,round,  nontender to palpation, with positive bowel sounds. Ileostomy patent.  Incision to the midline intact with staples.  No pain with palpation.No signs or symptoms of infection.   EXTREMITIES: Good range of motion on both upper and lower extremities with generalized weakness, trace pedal edema, no calf tenderness  SKIN: Warm and dry, no rashes or erythema noted  NEUROLOGIC: Intact, pulses palpable  PSYCHIATRIC: Cognition intact      Labs:    Recent Results (from the past 240 hour(s))   Magnesium   Result Value Ref Range    Magnesium 2.1 1.8 - 2.6 mg/dL   Potassium - Next AM   Result Value Ref Range    Potassium 4.2 3.5 - 5.0 mmol/L   POCT Glucose   Result Value Ref Range    Glucose,  mg/dL   POCT Glucose   Result Value Ref Range    Glucose,  mg/dL   POCT Glucose   Result Value Ref Range    Glucose, POC 87 mg/dL   POCT Glucose   Result Value Ref Range    Glucose,  mg/dL   HM2(CBC w/o Differential)   Result Value Ref Range    WBC 12.3 (H) 4.0 - 11.0 thou/uL    RBC 3.10 (L) 3.80 - 5.40 mill/uL    Hemoglobin 9.8 (L) 12.0 - 16.0 g/dL     Hematocrit 30.9 (L) 35.0 - 47.0 %     80 - 100 fL    MCH 31.6 27.0 - 34.0 pg    MCHC 31.7 (L) 32.0 - 36.0 g/dL    RDW 15.4 (H) 11.0 - 14.5 %    Platelets 240 140 - 440 thou/uL    MPV 9.6 8.5 - 12.5 fL   Basic Metabolic Panel   Result Value Ref Range    Sodium 137 136 - 145 mmol/L    Potassium 4.7 3.5 - 5.0 mmol/L    Chloride 102 98 - 107 mmol/L    CO2 28 22 - 31 mmol/L    Anion Gap, Calculation 7 5 - 18 mmol/L    Glucose 335 (H) 70 - 125 mg/dL    Calcium 9.0 8.5 - 10.5 mg/dL    BUN 12 8 - 28 mg/dL    Creatinine 1.03 0.60 - 1.10 mg/dL    GFR MDRD Af Amer >60 >60 mL/min/1.73m2    GFR MDRD Non Af Amer 51 (L) >60 mL/min/1.73m2   Magnesium   Result Value Ref Range    Magnesium 1.7 (L) 1.8 - 2.6 mg/dL   POCT Glucose   Result Value Ref Range    Glucose,  mg/dL   POCT Glucose   Result Value Ref Range    Glucose,  mg/dL   POCT Glucose   Result Value Ref Range    Glucose,  mg/dL   POCT Glucose   Result Value Ref Range    Glucose,  mg/dL   Potassium - Next AM   Result Value Ref Range    Potassium 4.2 3.5 - 5.0 mmol/L   Magnesium   Result Value Ref Range    Magnesium 1.7 (L) 1.8 - 2.6 mg/dL   Renal Function Profile   Result Value Ref Range    Albumin 2.6 (L) 3.5 - 5.0 g/dL    Calcium 10.0 8.5 - 10.5 mg/dL    Phosphorus 2.3 (L) 2.5 - 4.5 mg/dL    Glucose 199 (H) 70 - 125 mg/dL    BUN 15 8 - 28 mg/dL    Creatinine 0.94 0.60 - 1.10 mg/dL    Sodium 137 136 - 145 mmol/L    Potassium 4.2 3.5 - 5.0 mmol/L    Chloride 101 98 - 107 mmol/L    CO2 25 22 - 31 mmol/L    Anion Gap, Calculation 11 5 - 18 mmol/L    GFR MDRD Af Amer >60 >60 mL/min/1.73m2    GFR MDRD Non Af Amer 57 (L) >60 mL/min/1.73m2   HM1 (CBC with Diff)   Result Value Ref Range    WBC 14.1 (H) 4.0 - 11.0 thou/uL    RBC 3.08 (L) 3.80 - 5.40 mill/uL    Hemoglobin 9.9 (L) 12.0 - 16.0 g/dL    Hematocrit 30.7 (L) 35.0 - 47.0 %     80 - 100 fL    MCH 32.1 27.0 - 34.0 pg    MCHC 32.2 32.0 - 36.0 g/dL    RDW 14.9 (H) 11.0 - 14.5 %     Platelets 277 140 - 440 thou/uL    MPV 9.9 8.5 - 12.5 fL    Neutrophils % 82 (H) 50 - 70 %    Lymphocytes % 10 (L) 20 - 40 %    Monocytes % 6 2 - 10 %    Eosinophils % 2 0 - 6 %    Basophils % 1 0 - 2 %    Neutrophils Absolute 11.0 (H) 2.0 - 7.7 thou/uL    Lymphocytes Absolute 1.4 0.8 - 4.4 thou/uL    Monocytes Absolute 0.9 0.0 - 0.9 thou/uL    Eosinophils Absolute 0.2 0.0 - 0.4 thou/uL    Basophils Absolute 0.1 0.0 - 0.2 thou/uL   POCT Glucose   Result Value Ref Range    Glucose,  mg/dL   POCT Glucose   Result Value Ref Range    Glucose,  mg/dL   Procalcitonin   Result Value Ref Range    Procalcitonin 0.21 0.00 - 0.49 ng/mL   POCT Glucose   Result Value Ref Range    Glucose,  mg/dL   POCT Glucose   Result Value Ref Range    Glucose,  mg/dL   Magnesium   Result Value Ref Range    Magnesium 1.8 1.8 - 2.6 mg/dL   Renal Function Profile   Result Value Ref Range    Albumin 2.7 (L) 3.5 - 5.0 g/dL    Calcium 9.7 8.5 - 10.5 mg/dL    Phosphorus 3.1 2.5 - 4.5 mg/dL    Glucose 153 (H) 70 - 125 mg/dL    BUN 21 8 - 28 mg/dL    Creatinine 1.03 0.60 - 1.10 mg/dL    Sodium 139 136 - 145 mmol/L    Potassium 4.2 3.5 - 5.0 mmol/L    Chloride 104 98 - 107 mmol/L    CO2 25 22 - 31 mmol/L    Anion Gap, Calculation 10 5 - 18 mmol/L    GFR MDRD Af Amer >60 >60 mL/min/1.73m2    GFR MDRD Non Af Amer 51 (L) >60 mL/min/1.73m2   Procalcitonin   Result Value Ref Range    Procalcitonin 0.14 0.00 - 0.49 ng/mL   HM1 (CBC with Diff)   Result Value Ref Range    WBC 13.2 (H) 4.0 - 11.0 thou/uL    RBC 3.13 (L) 3.80 - 5.40 mill/uL    Hemoglobin 9.8 (L) 12.0 - 16.0 g/dL    Hematocrit 30.8 (L) 35.0 - 47.0 %    MCV 98 80 - 100 fL    MCH 31.3 27.0 - 34.0 pg    MCHC 31.8 (L) 32.0 - 36.0 g/dL    RDW 15.0 (H) 11.0 - 14.5 %    Platelets 243 140 - 440 thou/uL    MPV 9.4 8.5 - 12.5 fL   Manual Differential   Result Value Ref Range    Total Neutrophils % 76 (H) 50 - 70 %    Lymphocytes % 14 (L) 20 - 40 %    Monocytes % 4 2 - 10 %     Eosinophils %  2 0 - 6 %    Basophils % 1 0 - 2 %    Metamyelocytes % 2 (H) <=1 %    Myelocytes % 3 (H) <=1 %    Total Neutrophils Absolute 10.0 (H) 2.0 - 7.7 thou/ul    Lymphocytes Absolute 1.8 0.8 - 4.4 thou/uL    Monocytes Absolute 0.5 0.0 - 0.9 thou/uL    Eosinophils Absolute 0.3 0.0 - 0.4 thou/uL    Basophils Absolute 0.1 0.0 - 0.2 thou/uL    Metamyelocytes Absolute 0.2 (H) <=0.1 thou/uL    Myelocytes Absolute 0.3 (H) <=0.1 thou/uL    Platelet Estimate Normal Normal    Ovalocytes 1+ (!) Negative   POCT Glucose   Result Value Ref Range    Glucose,  mg/dL   POCT Glucose   Result Value Ref Range    Glucose,  mg/dL   Electrolyte Profile   Result Value Ref Range    Sodium 137 136 - 145 mmol/L    Potassium 5.5 (H) 3.5 - 5.0 mmol/L    CO2 28 22 - 31 mmol/L    Chloride 99 98 - 107 mmol/L    Anion Gap, Calculation 10 5 - 18 mmol/L   Magnesium   Result Value Ref Range    Magnesium 2.2 1.8 - 2.6 mg/dL   Renal Function Profile   Result Value Ref Range    Albumin 3.2 (L) 3.5 - 5.0 g/dL    Calcium 10.6 (H) 8.5 - 10.5 mg/dL    Phosphorus 4.1 2.5 - 4.5 mg/dL    Glucose 204 (H) 70 - 125 mg/dL    BUN 34 (H) 8 - 28 mg/dL    Creatinine 1.57 (H) 0.60 - 1.10 mg/dL    Sodium 137 136 - 145 mmol/L    Potassium 5.5 (H) 3.5 - 5.0 mmol/L    Chloride 99 98 - 107 mmol/L    CO2 28 22 - 31 mmol/L    Anion Gap, Calculation 10 5 - 18 mmol/L    GFR MDRD Af Amer 38 (L) >60 mL/min/1.73m2    GFR MDRD Non Af Amer 31 (L) >60 mL/min/1.73m2   HM1 (CBC with Diff)   Result Value Ref Range    WBC 19.1 (H) 4.0 - 11.0 thou/uL    RBC 3.60 (L) 3.80 - 5.40 mill/uL    Hemoglobin 11.3 (L) 12.0 - 16.0 g/dL    Hematocrit 37.0 35.0 - 47.0 %     (H) 80 - 100 fL    MCH 31.4 27.0 - 34.0 pg    MCHC 30.5 (L) 32.0 - 36.0 g/dL    RDW 14.9 (H) 11.0 - 14.5 %    Platelets 308 140 - 440 thou/uL    MPV 10.0 8.5 - 12.5 fL   Basic Metabolic Panel   Result Value Ref Range    Sodium 138 136 - 145 mmol/L    Potassium 5.5 (H) 3.5 - 5.0 mmol/L    Chloride 99 98 -  107 mmol/L    CO2 28 22 - 31 mmol/L    Anion Gap, Calculation 11 5 - 18 mmol/L    Glucose 201 (H) 70 - 125 mg/dL    Calcium 10.5 8.5 - 10.5 mg/dL    BUN 34 (H) 8 - 28 mg/dL    Creatinine 1.59 (H) 0.60 - 1.10 mg/dL    GFR MDRD Af Amer 37 (L) >60 mL/min/1.73m2    GFR MDRD Non Af Amer 31 (L) >60 mL/min/1.73m2   Manual Differential   Result Value Ref Range    Total Neutrophils % 82 (H) 50 - 70 %    Lymphocytes % 5 (L) 20 - 40 %    Monocytes % 7 2 - 10 %    Eosinophils %  3 0 - 6 %    Basophils % 0 0 - 2 %    Metamyelocytes % 3 (H) <=1 %    Myelocytes % 1 <=1 %    Total Neutrophils Absolute 15.6 (H) 2.0 - 7.7 thou/ul    Lymphocytes Absolute 1.0 0.8 - 4.4 thou/uL    Monocytes Absolute 1.3 (H) 0.0 - 0.9 thou/uL    Eosinophils Absolute 0.6 (H) 0.0 - 0.4 thou/uL    Basophils Absolute 0.0 0.0 - 0.2 thou/uL    Metamyelocytes Absolute 0.6 (H) <=0.1 thou/uL    Myelocytes Absolute 0.1 <=0.1 thou/uL    Manual nRBC per 100 Cells 1 (H) 0-<1    Platelet Estimate Normal Normal    Ovalocytes 1+ (!) Negative    Polychromasia 1+ (!) Negative    Tear Drop Cells 1+ (!) Negative   Basic Metabolic Panel   Result Value Ref Range    Sodium 132 (L) 136 - 145 mmol/L    Potassium 4.7 3.5 - 5.0 mmol/L    Chloride 100 98 - 107 mmol/L    CO2 22 22 - 31 mmol/L    Anion Gap, Calculation 10 5 - 18 mmol/L    Glucose 360 (H) 70 - 125 mg/dL    Calcium 9.4 8.5 - 10.5 mg/dL    BUN 35 (H) 8 - 28 mg/dL    Creatinine 1.63 (H) 0.60 - 1.10 mg/dL    GFR MDRD Af Amer 36 (L) >60 mL/min/1.73m2    GFR MDRD Non Af Amer 30 (L) >60 mL/min/1.73m2   Urinalysis-UC if Indicated   Result Value Ref Range    Color, UA Yellow Colorless, Yellow, Straw, Light Yellow    Clarity, UA Turbid (!) Clear    Glucose,  mg/dL (!) Negative    Bilirubin, UA Negative Negative    Ketones, UA Negative Negative    Specific Gravity, UA 1.025 1.001 - 1.030    Blood, UA Small (!) Negative    pH, UA 5.5 4.5 - 8.0    Protein, UA 50 mg/dL (!) Negative mg/dL    Urobilinogen, UA <2.0 E.U./dL <2.0  E.U./dL, 2.0 E.U./dL    Nitrite, UA Negative Negative    Leukocytes, UA Large (!) Negative    Bacteria, UA Many (!) None Seen hpf    RBC, UA 0-2 None Seen, 0-2 hpf    WBC, UA >100 (!) None Seen, 0-5 hpf    Squam Epithel, UA 0-5 None Seen, 0-5 lpf    WBC Clumps Present (!) None Seen    Mucus, UA Few (!) None Seen lpf   Culture, Urine   Result Value Ref Range    Culture >100,000 col/ml Klebsiella pneumoniae (!)        Susceptibility    Klebsiella pneumoniae - EVELIO     Amoxicillin + Clavulanate <=4/2 Sensitive      Ampicillin 8 Resistant      Cefazolin <=1 Sensitive      Cefepime <=1 Sensitive      Ciprofloxacin <=0.5 Sensitive      Gentamicin <=2 Sensitive      Levofloxacin <=1 Sensitive      Meropenem <=0.25 Sensitive      Nitrofurantoin 64 Intermediate      Tetracycline <=2 Sensitive      Tobramycin <=2 Sensitive      Trimethoprim + Sulfamethoxazole <=0.5 Sensitive    Basic Metabolic Panel   Result Value Ref Range    Sodium 133 (L) 136 - 145 mmol/L    Potassium 5.4 (H) 3.5 - 5.0 mmol/L    Chloride 102 98 - 107 mmol/L    CO2 18 (L) 22 - 31 mmol/L    Anion Gap, Calculation 13 5 - 18 mmol/L    Glucose 366 (H) 70 - 125 mg/dL    Calcium 9.5 8.5 - 10.5 mg/dL    BUN 34 (H) 8 - 28 mg/dL    Creatinine 1.49 (H) 0.60 - 1.10 mg/dL    GFR MDRD Af Amer 40 (L) >60 mL/min/1.73m2    GFR MDRD Non Af Amer 33 (L) >60 mL/min/1.73m2   HM2(CBC w/o Differential)   Result Value Ref Range    WBC 21.1 (H) 4.0 - 11.0 thou/uL    RBC 3.28 (L) 3.80 - 5.40 mill/uL    Hemoglobin 10.3 (L) 12.0 - 16.0 g/dL    Hematocrit 32.7 (L) 35.0 - 47.0 %     80 - 100 fL    MCH 31.4 27.0 - 34.0 pg    MCHC 31.5 (L) 32.0 - 36.0 g/dL    RDW 14.8 (H) 11.0 - 14.5 %    Platelets 284 140 - 440 thou/uL    MPV 10.5 8.5 - 12.5 fL   Procalcitonin   Result Value Ref Range    Procalcitonin 0.07 0.00 - 0.49 ng/mL   Basic Metabolic Panel   Result Value Ref Range    Sodium 141 136 - 145 mmol/L    Potassium 3.6 3.5 - 5.0 mmol/L    Chloride 108 (H) 98 - 107 mmol/L    CO2 27  22 - 31 mmol/L    Anion Gap, Calculation 6 5 - 18 mmol/L    Glucose 75 70 - 125 mg/dL    Calcium 8.4 (L) 8.5 - 10.5 mg/dL    BUN 16 8 - 28 mg/dL    Creatinine 1.04 0.60 - 1.10 mg/dL    GFR MDRD Af Amer >60 >60 mL/min/1.73m2    GFR MDRD Non Af Amer 50 (L) >60 mL/min/1.73m2   Procalcitonin   Result Value Ref Range    Procalcitonin 0.06 0.00 - 0.49 ng/mL   HM1 (CBC with Diff)   Result Value Ref Range    WBC 8.7 4.0 - 11.0 thou/uL    RBC 3.18 (L) 3.80 - 5.40 mill/uL    Hemoglobin 9.5 (L) 12.0 - 16.0 g/dL    Hematocrit 30.6 (L) 35.0 - 47.0 %    MCV 96 80 - 100 fL    MCH 29.9 27.0 - 34.0 pg    MCHC 31.0 (L) 32.0 - 36.0 g/dL    RDW 15.4 (H) 11.0 - 14.5 %    Platelets 285 140 - 440 thou/uL    MPV 11.1 8.5 - 12.5 fL    Neutrophils % 56 50 - 70 %    Lymphocytes % 24 20 - 40 %    Monocytes % 14 (H) 2 - 10 %    Eosinophils % 4 0 - 6 %    Basophils % 1 0 - 2 %    Neutrophils Absolute 4.9 2.0 - 7.7 thou/uL    Lymphocytes Absolute 2.1 0.8 - 4.4 thou/uL    Monocytes Absolute 1.2 (H) 0.0 - 0.9 thou/uL    Eosinophils Absolute 0.4 0.0 - 0.4 thou/uL    Basophils Absolute 0.1 0.0 - 0.2 thou/uL         Assessment/Plan:    1. Colonic obstruction (H)     2. Ulcerative colitis (H)     3. S/P colectomy     4. Ileostomy bag changed (H)     5. Leukocytosis     6. Urinary tract infection     7. JOANNE (acute kidney injury) (H)       Patient with a history of ulcerative colitis with colon obstruction status post colectomy with ileostomy. Ileostomy patent.  She denies any abdominal pain.  Acute kidney injury secondary to hypo-tension.  She was given 1 L of IV fluids with repeat. Kidney function now within normal limits. Leukocytosis with WBC of 21. UC positive for >100,000 Klebsiella pneumoniae. She was given 2gm of Rocephin. I will switch her to Cipro 250mg two times a day X 3 days. DM with elevated blood sugars. I will again increase her insulin. Abdominal US showed cholestasias. Currently asymptomatic. Will monitor for increased symptoms  including abdominal pain, fever, nausea and or vomiting. Follow up labs on Monday.     Total time spent for this visit was 60 minutes with greater than 50% of the time spent face-to-face with patient and further review with her daughter reviewing above plan of care.    Electronically signed by: Smitha Blandon CNP

## 2021-06-21 NOTE — PROGRESS NOTES
Code Status:  FULL CODE  Visit Type: Problem Visit     Facility:  [facility] [[id number]]         Facility Type: SNF (Skilled Nursing Facility, TCU)    History of Present Illness: Ani Castillo is a 85 y.o. female who I am seeing today for follow up on the TCU. Pt recently hospitalized on 10/2/18-10/12/18 s/p total abdominal colectomy, omentectomy, creation and ileostomy open on 10/6.  Patient with history of ulcerative colitis, type 2 diabetes mellitus, MALISSA, diastolic CHF, persistent A. fib admitted for sigmoid colon obstruction in setting of known ulcerative colitis.  Patient initially treated with IV Zosyn and discontinue on 10/9.  Leukocytosis secondary to steroid noted during hospitalization.  Initially Improving with low pro-calcitonin level.  Patient was discharged with tapering dose of prednisone per GI per previous hospitalist.  Patient was seen by Dr. Grissom over the weekend.  There was concerns for low blood pressure.  Systolics in the 80s.  Her metoprolol was held.  She was also treated for fluid repletion.  BUN and creatinine 1.52.  She was given a liter of fluid. She has completed this.  Today she is sitting up in wheelchair.  She appears stable.  Denies any fever or chills.  Denies any dizziness.  Blood pressures are greatly improved.  Weights are stable.  No shortness of breath or chest pain.  She does continue with elevated white count.  Review of her records showed a white count of 13-19.  I did ask nursing staff to have lab add on a pro calcitonin level.  She is currently afebrile.  She is on a wean of her prednisone now 10 mg daily.  Her colostomy is patent.  She denies any abdominal pain.  She is voiding adequately however reports urinary frequency.  Blood sugars continue to be in the 300s.  Review of the records show she was on 25 units in the morning.  Currently now only on 14.    Today pt sitting up in bedside chair. She denies any pain in her abdomen. She is again  receiving additional IV fluids for worsening creatine. She denies any shortness of breath or CP. She is a-febrile although WBC continues to rise. UA positive she was given 1 dose of IM Rocephin last night. She continues with urinary frequency. Ileostomy is patent. Incision to midline dry and intact with staples.         Active Ambulatory Problems     Diagnosis Date Noted     DM (diabetes mellitus) (H)      Chronic Kidney Disease (NKF Classification)      Benign essential hypertension      Persistent atrial fibrillation (H)      Chronic diastolic CHF (congestive heart failure) (H)      Sleep apnea      Shortness Of Breath      Hyperlipidemia      GI bleed 10/18/2016     PRB (rectal bleeding)      Anemia due to blood loss, acute      Polyarthralgia 10/10/2017     Primary osteoarthritis involving multiple joints 12/08/2017     Inflammatory polyarthritis (H) 12/08/2017     Chondrocalcinosis 12/08/2017     Insulin dependent diabetes mellitus with complications (H)      Coronary artery disease involving native coronary artery without angina pectoris 04/11/2018     Ulcerative colitis (H) 08/23/2018     Hyperglycemia      Chronic ulcerative colitis with rectal bleeding (H) 09/07/2018     Bloody diarrhea      Lightheadedness      Chronic atrial fibrillation (H)      Diastolic dysfunction      Diabetes mellitus, type II, insulin dependent (H)      History of coronary artery disease      Colon obstruction (H) 10/05/2018     Hypokalemia      Skin tear of forearm without complication, left, initial encounter      Hypomagnesemia      Postprocedural hypotension      Resolved Ambulatory Problems     Diagnosis Date Noted     Supratherapeutic INR      Crohn's disease with complication, unspecified gastrointestinal tract location (H)      Past Medical History:   Diagnosis Date     Atrial fibrillation (H)      Benign essential hypertension      Cardiomyopathy (H)      CHF (congestive heart failure) (H)      Chronic kidney disease       Chronic Kidney Disease (NKF Classification)      Colitis      Diabetes mellitus (H)      Diastolic Congestive Heart Failure      DM (diabetes mellitus) (H)      GI bleed 10/18/2016     Gout      Hyperlipidemia      Hyperlipidemia      Hypertension      Morbid obesity (H)      MALISSA (obstructive sleep apnea)      Persistent atrial fibrillation (H)      PRB (rectal bleeding)      Shortness of breath      Sleep apnea      SOB (shortness of breath)      Urinary incontinence        Current Outpatient Prescriptions   Medication Sig     acetaminophen (ACETAMINOPHEN EXTRA STRENGTH) 500 MG tablet Take 1,000 mg by mouth 2 (two) times a day as needed.      aspirin 81 mg chewable tablet Chew 81 mg daily.     blood sugar diagnostic (GLUCOSE BLOOD) Strp Use As Directed. OneTouch Ultra Blue In Vitro Strip.     escitalopram oxalate (LEXAPRO) 10 MG tablet Take 10 mg by mouth daily.     ferrous sulfate 325 (65 FE) MG tablet Take 1 tablet by mouth daily with breakfast.     insulin lispro (HUMALOG) 100 unit/mL injection Inject 2-10 Units under the skin 3 (three) times a day before meals. Sliding scale (from [facility] clinic records)  150-200 2 units  200-250 4 units  Up to 10 units if > 350     insulin NPH (NOVOLIN) 100 unit/mL injection Inject 16 Units under the skin 2 (two) times a day.     Lactobacillus rhamnosus GG (CULTURELLE) 10-15 Billion cell capsule Take 1 capsule by mouth daily.     metoprolol tartrate (LOPRESSOR) 25 MG tablet Take 1 tablet (25 mg total) by mouth 2 (two) times a day. (Patient taking differently: Take 12.5 mg by mouth 2 (two) times a day. )     omeprazole (PRILOSEC) 20 MG capsule Take 1 capsule (20 mg total) by mouth daily before breakfast.     predniSONE (DELTASONE) 10 mg tablet 2 tabs daily for 3 days and 1 tab daily for 10 days.     simvastatin (ZOCOR) 20 MG tablet Take 20 mg by mouth bedtime.        Allergies   Allergen Reactions     Metformin Other (See Comments)     GI symptoms         Review of Systems   No  fevers or chills. No headache, lightheadedness or dizziness. No SOB, chest pains or palpitations. Appetite is good. No nausea, vomiting, constipation or diarrhea. She has ileostomy in place which is patent. No dysuria, frequency, burning or pain with urination.She does report urinary frequency. Denies any pain.  Otherwise review of systems are negative.       Physical Exam   PHYSICAL EXAMINATION:  Vital signs: /84, heart rate 74, respirations 16, temperature 97.4, O2 sat 100% on room air.  Current weight 189 pounds.  General: Awake, Alert, oriented x3, appropriately, follows simple commands, conversant  HEENT:PERRLA, Pink conjunctiva, anicteric sclerae, moist oral mucosa  NECK: Supple, without any lymphadenopathy, or masses  CVS:  S1  S2, without murmur or gallop.   LUNG: Clear to auscultation, No wheezes, rales or rhonci.  BACK: No kyphosis of the thoracic spine  ABDOMEN: Soft,round,  nontender to palpation, with positive bowel sounds. Ileostomy patent.  Incision to the midline intact with staples.  No pain with palpation.No signs or symptoms of infection.   EXTREMITIES: Good range of motion on both upper and lower extremities with generalized weakness, trace pedal edema, no calf tenderness  SKIN: Warm and dry, no rashes or erythema noted  NEUROLOGIC: Intact, pulses palpable  PSYCHIATRIC: Cognition intact      Labs:    Recent Results (from the past 240 hour(s))   Potassium   Result Value Ref Range    Potassium 4.5 3.5 - 5.0 mmol/L   POCT Glucose   Result Value Ref Range    Glucose,  mg/dL   POCT Glucose   Result Value Ref Range    Glucose,  mg/dL   Magnesium   Result Value Ref Range    Magnesium 2.3 1.8 - 2.6 mg/dL   Potassium   Result Value Ref Range    Potassium 3.4 (L) 3.5 - 5.0 mmol/L   POCT Glucose   Result Value Ref Range    Glucose,  mg/dL   POCT Glucose   Result Value Ref Range    Glucose,  mg/dL   POCT Glucose   Result Value Ref Range    Glucose,  mg/dL   Potassium    Result Value Ref Range    Potassium 4.2 3.5 - 5.0 mmol/L   POCT Glucose   Result Value Ref Range    Glucose,  mg/dL   Magnesium   Result Value Ref Range    Magnesium 2.1 1.8 - 2.6 mg/dL   Potassium - Next AM   Result Value Ref Range    Potassium 4.2 3.5 - 5.0 mmol/L   POCT Glucose   Result Value Ref Range    Glucose,  mg/dL   POCT Glucose   Result Value Ref Range    Glucose,  mg/dL   POCT Glucose   Result Value Ref Range    Glucose, POC 87 mg/dL   POCT Glucose   Result Value Ref Range    Glucose,  mg/dL   HM2(CBC w/o Differential)   Result Value Ref Range    WBC 12.3 (H) 4.0 - 11.0 thou/uL    RBC 3.10 (L) 3.80 - 5.40 mill/uL    Hemoglobin 9.8 (L) 12.0 - 16.0 g/dL    Hematocrit 30.9 (L) 35.0 - 47.0 %     80 - 100 fL    MCH 31.6 27.0 - 34.0 pg    MCHC 31.7 (L) 32.0 - 36.0 g/dL    RDW 15.4 (H) 11.0 - 14.5 %    Platelets 240 140 - 440 thou/uL    MPV 9.6 8.5 - 12.5 fL   Basic Metabolic Panel   Result Value Ref Range    Sodium 137 136 - 145 mmol/L    Potassium 4.7 3.5 - 5.0 mmol/L    Chloride 102 98 - 107 mmol/L    CO2 28 22 - 31 mmol/L    Anion Gap, Calculation 7 5 - 18 mmol/L    Glucose 335 (H) 70 - 125 mg/dL    Calcium 9.0 8.5 - 10.5 mg/dL    BUN 12 8 - 28 mg/dL    Creatinine 1.03 0.60 - 1.10 mg/dL    GFR MDRD Af Amer >60 >60 mL/min/1.73m2    GFR MDRD Non Af Amer 51 (L) >60 mL/min/1.73m2   Magnesium   Result Value Ref Range    Magnesium 1.7 (L) 1.8 - 2.6 mg/dL   POCT Glucose   Result Value Ref Range    Glucose,  mg/dL   POCT Glucose   Result Value Ref Range    Glucose,  mg/dL   POCT Glucose   Result Value Ref Range    Glucose,  mg/dL   POCT Glucose   Result Value Ref Range    Glucose,  mg/dL   Potassium - Next AM   Result Value Ref Range    Potassium 4.2 3.5 - 5.0 mmol/L   Magnesium   Result Value Ref Range    Magnesium 1.7 (L) 1.8 - 2.6 mg/dL   Renal Function Profile   Result Value Ref Range    Albumin 2.6 (L) 3.5 - 5.0 g/dL    Calcium 10.0 8.5 - 10.5  mg/dL    Phosphorus 2.3 (L) 2.5 - 4.5 mg/dL    Glucose 199 (H) 70 - 125 mg/dL    BUN 15 8 - 28 mg/dL    Creatinine 0.94 0.60 - 1.10 mg/dL    Sodium 137 136 - 145 mmol/L    Potassium 4.2 3.5 - 5.0 mmol/L    Chloride 101 98 - 107 mmol/L    CO2 25 22 - 31 mmol/L    Anion Gap, Calculation 11 5 - 18 mmol/L    GFR MDRD Af Amer >60 >60 mL/min/1.73m2    GFR MDRD Non Af Amer 57 (L) >60 mL/min/1.73m2   HM1 (CBC with Diff)   Result Value Ref Range    WBC 14.1 (H) 4.0 - 11.0 thou/uL    RBC 3.08 (L) 3.80 - 5.40 mill/uL    Hemoglobin 9.9 (L) 12.0 - 16.0 g/dL    Hematocrit 30.7 (L) 35.0 - 47.0 %     80 - 100 fL    MCH 32.1 27.0 - 34.0 pg    MCHC 32.2 32.0 - 36.0 g/dL    RDW 14.9 (H) 11.0 - 14.5 %    Platelets 277 140 - 440 thou/uL    MPV 9.9 8.5 - 12.5 fL    Neutrophils % 82 (H) 50 - 70 %    Lymphocytes % 10 (L) 20 - 40 %    Monocytes % 6 2 - 10 %    Eosinophils % 2 0 - 6 %    Basophils % 1 0 - 2 %    Neutrophils Absolute 11.0 (H) 2.0 - 7.7 thou/uL    Lymphocytes Absolute 1.4 0.8 - 4.4 thou/uL    Monocytes Absolute 0.9 0.0 - 0.9 thou/uL    Eosinophils Absolute 0.2 0.0 - 0.4 thou/uL    Basophils Absolute 0.1 0.0 - 0.2 thou/uL   POCT Glucose   Result Value Ref Range    Glucose,  mg/dL   POCT Glucose   Result Value Ref Range    Glucose,  mg/dL   Procalcitonin   Result Value Ref Range    Procalcitonin 0.21 0.00 - 0.49 ng/mL   POCT Glucose   Result Value Ref Range    Glucose,  mg/dL   POCT Glucose   Result Value Ref Range    Glucose,  mg/dL   Magnesium   Result Value Ref Range    Magnesium 1.8 1.8 - 2.6 mg/dL   Renal Function Profile   Result Value Ref Range    Albumin 2.7 (L) 3.5 - 5.0 g/dL    Calcium 9.7 8.5 - 10.5 mg/dL    Phosphorus 3.1 2.5 - 4.5 mg/dL    Glucose 153 (H) 70 - 125 mg/dL    BUN 21 8 - 28 mg/dL    Creatinine 1.03 0.60 - 1.10 mg/dL    Sodium 139 136 - 145 mmol/L    Potassium 4.2 3.5 - 5.0 mmol/L    Chloride 104 98 - 107 mmol/L    CO2 25 22 - 31 mmol/L    Anion Gap, Calculation 10 5 -  18 mmol/L    GFR MDRD Af Amer >60 >60 mL/min/1.73m2    GFR MDRD Non Af Amer 51 (L) >60 mL/min/1.73m2   Procalcitonin   Result Value Ref Range    Procalcitonin 0.14 0.00 - 0.49 ng/mL   HM1 (CBC with Diff)   Result Value Ref Range    WBC 13.2 (H) 4.0 - 11.0 thou/uL    RBC 3.13 (L) 3.80 - 5.40 mill/uL    Hemoglobin 9.8 (L) 12.0 - 16.0 g/dL    Hematocrit 30.8 (L) 35.0 - 47.0 %    MCV 98 80 - 100 fL    MCH 31.3 27.0 - 34.0 pg    MCHC 31.8 (L) 32.0 - 36.0 g/dL    RDW 15.0 (H) 11.0 - 14.5 %    Platelets 243 140 - 440 thou/uL    MPV 9.4 8.5 - 12.5 fL   Manual Differential   Result Value Ref Range    Total Neutrophils % 76 (H) 50 - 70 %    Lymphocytes % 14 (L) 20 - 40 %    Monocytes % 4 2 - 10 %    Eosinophils %  2 0 - 6 %    Basophils % 1 0 - 2 %    Metamyelocytes % 2 (H) <=1 %    Myelocytes % 3 (H) <=1 %    Total Neutrophils Absolute 10.0 (H) 2.0 - 7.7 thou/ul    Lymphocytes Absolute 1.8 0.8 - 4.4 thou/uL    Monocytes Absolute 0.5 0.0 - 0.9 thou/uL    Eosinophils Absolute 0.3 0.0 - 0.4 thou/uL    Basophils Absolute 0.1 0.0 - 0.2 thou/uL    Metamyelocytes Absolute 0.2 (H) <=0.1 thou/uL    Myelocytes Absolute 0.3 (H) <=0.1 thou/uL    Platelet Estimate Normal Normal    Ovalocytes 1+ (!) Negative   POCT Glucose   Result Value Ref Range    Glucose,  mg/dL   POCT Glucose   Result Value Ref Range    Glucose,  mg/dL   Electrolyte Profile   Result Value Ref Range    Sodium 137 136 - 145 mmol/L    Potassium 5.5 (H) 3.5 - 5.0 mmol/L    CO2 28 22 - 31 mmol/L    Chloride 99 98 - 107 mmol/L    Anion Gap, Calculation 10 5 - 18 mmol/L   Magnesium   Result Value Ref Range    Magnesium 2.2 1.8 - 2.6 mg/dL   Renal Function Profile   Result Value Ref Range    Albumin 3.2 (L) 3.5 - 5.0 g/dL    Calcium 10.6 (H) 8.5 - 10.5 mg/dL    Phosphorus 4.1 2.5 - 4.5 mg/dL    Glucose 204 (H) 70 - 125 mg/dL    BUN 34 (H) 8 - 28 mg/dL    Creatinine 1.57 (H) 0.60 - 1.10 mg/dL    Sodium 137 136 - 145 mmol/L    Potassium 5.5 (H) 3.5 - 5.0 mmol/L     Chloride 99 98 - 107 mmol/L    CO2 28 22 - 31 mmol/L    Anion Gap, Calculation 10 5 - 18 mmol/L    GFR MDRD Af Amer 38 (L) >60 mL/min/1.73m2    GFR MDRD Non Af Amer 31 (L) >60 mL/min/1.73m2   HM1 (CBC with Diff)   Result Value Ref Range    WBC 19.1 (H) 4.0 - 11.0 thou/uL    RBC 3.60 (L) 3.80 - 5.40 mill/uL    Hemoglobin 11.3 (L) 12.0 - 16.0 g/dL    Hematocrit 37.0 35.0 - 47.0 %     (H) 80 - 100 fL    MCH 31.4 27.0 - 34.0 pg    MCHC 30.5 (L) 32.0 - 36.0 g/dL    RDW 14.9 (H) 11.0 - 14.5 %    Platelets 308 140 - 440 thou/uL    MPV 10.0 8.5 - 12.5 fL   Basic Metabolic Panel   Result Value Ref Range    Sodium 138 136 - 145 mmol/L    Potassium 5.5 (H) 3.5 - 5.0 mmol/L    Chloride 99 98 - 107 mmol/L    CO2 28 22 - 31 mmol/L    Anion Gap, Calculation 11 5 - 18 mmol/L    Glucose 201 (H) 70 - 125 mg/dL    Calcium 10.5 8.5 - 10.5 mg/dL    BUN 34 (H) 8 - 28 mg/dL    Creatinine 1.59 (H) 0.60 - 1.10 mg/dL    GFR MDRD Af Amer 37 (L) >60 mL/min/1.73m2    GFR MDRD Non Af Amer 31 (L) >60 mL/min/1.73m2   Manual Differential   Result Value Ref Range    Total Neutrophils % 82 (H) 50 - 70 %    Lymphocytes % 5 (L) 20 - 40 %    Monocytes % 7 2 - 10 %    Eosinophils %  3 0 - 6 %    Basophils % 0 0 - 2 %    Metamyelocytes % 3 (H) <=1 %    Myelocytes % 1 <=1 %    Total Neutrophils Absolute 15.6 (H) 2.0 - 7.7 thou/ul    Lymphocytes Absolute 1.0 0.8 - 4.4 thou/uL    Monocytes Absolute 1.3 (H) 0.0 - 0.9 thou/uL    Eosinophils Absolute 0.6 (H) 0.0 - 0.4 thou/uL    Basophils Absolute 0.0 0.0 - 0.2 thou/uL    Metamyelocytes Absolute 0.6 (H) <=0.1 thou/uL    Myelocytes Absolute 0.1 <=0.1 thou/uL    Manual nRBC per 100 Cells 1 (H) 0-<1    Platelet Estimate Normal Normal    Ovalocytes 1+ (!) Negative    Polychromasia 1+ (!) Negative    Tear Drop Cells 1+ (!) Negative   Basic Metabolic Panel   Result Value Ref Range    Sodium 132 (L) 136 - 145 mmol/L    Potassium 4.7 3.5 - 5.0 mmol/L    Chloride 100 98 - 107 mmol/L    CO2 22 22 - 31 mmol/L     Anion Gap, Calculation 10 5 - 18 mmol/L    Glucose 360 (H) 70 - 125 mg/dL    Calcium 9.4 8.5 - 10.5 mg/dL    BUN 35 (H) 8 - 28 mg/dL    Creatinine 1.63 (H) 0.60 - 1.10 mg/dL    GFR MDRD Af Amer 36 (L) >60 mL/min/1.73m2    GFR MDRD Non Af Amer 30 (L) >60 mL/min/1.73m2   Urinalysis-UC if Indicated   Result Value Ref Range    Color, UA Yellow Colorless, Yellow, Straw, Light Yellow    Clarity, UA Turbid (!) Clear    Glucose,  mg/dL (!) Negative    Bilirubin, UA Negative Negative    Ketones, UA Negative Negative    Specific Gravity, UA 1.025 1.001 - 1.030    Blood, UA Small (!) Negative    pH, UA 5.5 4.5 - 8.0    Protein, UA 50 mg/dL (!) Negative mg/dL    Urobilinogen, UA <2.0 E.U./dL <2.0 E.U./dL, 2.0 E.U./dL    Nitrite, UA Negative Negative    Leukocytes, UA Large (!) Negative    Bacteria, UA Many (!) None Seen hpf    RBC, UA 0-2 None Seen, 0-2 hpf    WBC, UA >100 (!) None Seen, 0-5 hpf    Squam Epithel, UA 0-5 None Seen, 0-5 lpf    WBC Clumps Present (!) None Seen    Mucus, UA Few (!) None Seen lpf   Culture, Urine   Result Value Ref Range    Culture >100,000 col/ml Gram Negative Rods (!)    Basic Metabolic Panel   Result Value Ref Range    Sodium 133 (L) 136 - 145 mmol/L    Potassium 5.4 (H) 3.5 - 5.0 mmol/L    Chloride 102 98 - 107 mmol/L    CO2 18 (L) 22 - 31 mmol/L    Anion Gap, Calculation 13 5 - 18 mmol/L    Glucose 366 (H) 70 - 125 mg/dL    Calcium 9.5 8.5 - 10.5 mg/dL    BUN 34 (H) 8 - 28 mg/dL    Creatinine 1.49 (H) 0.60 - 1.10 mg/dL    GFR MDRD Af Amer 40 (L) >60 mL/min/1.73m2    GFR MDRD Non Af Amer 33 (L) >60 mL/min/1.73m2   HM2(CBC w/o Differential)   Result Value Ref Range    WBC 21.1 (H) 4.0 - 11.0 thou/uL    RBC 3.28 (L) 3.80 - 5.40 mill/uL    Hemoglobin 10.3 (L) 12.0 - 16.0 g/dL    Hematocrit 32.7 (L) 35.0 - 47.0 %     80 - 100 fL    MCH 31.4 27.0 - 34.0 pg    MCHC 31.5 (L) 32.0 - 36.0 g/dL    RDW 14.8 (H) 11.0 - 14.5 %    Platelets 284 140 - 440 thou/uL    MPV 10.5 8.5 - 12.5 fL    Procalcitonin   Result Value Ref Range    Procalcitonin 0.07 0.00 - 0.49 ng/mL         Assessment/Plan:  1. Colonic obstruction (H)     2. S/P colectomy     3. Ileostomy bag changed (H)     4. Leukocytosis     5. Ulcerative colitis (H)     6. JOANNE (acute kidney injury) (H)     7. Urinary tract infection           Patient with a history of ulcerative colitis with colon obstruction status post colectomy with ileostomy. Ileostomy patent.  She denies any abdominal pain.  Acute kidney injury secondary to hypo-tension.  She was given 1 L of IV fluids.  After 1st liter Creatine 1.63 with BUN of 35. She was treated with additional liter. She is receiving this today.  Leukocytosis.  This was thought to be steroid related.  However white count up from 13-19.  She is afebrile.  She is complaining of urinary frequency.  UA UC obtained today.  UA appears positive.  Large leukocytes.  Many bacteria.  Color is turbid. She was given 1 dose of IM Rocephin with lidocaine last pm. We are awaiting sensitivities. Also awaiting procalctonin level. This was declining during hospitalization. She is only on 10 mg of prednisone.  Blood sugars continue to be elevated in the 300-400s. I increased her insulin to 16 units twice daily.  She is also on sliding scale.  Blood pressure stable.  Heart rate stable. Will repeat laboratory in am when fluids complete. Also obtain abdominal US in am.         Electronically signed by: Smitha Blandon, BARNEY

## 2021-06-21 NOTE — PROGRESS NOTES
Code Status:  FULL CODE  Visit Type: Discharge Summary     Facility:  [facility] [[id number]]         Facility Type: SNF (Skilled Nursing Facility, TCU)    History of Present Illness: Ani Castillo is a 85 y.o. female who I am seeing today for discharge from the TCU. Pt recently hospitalized on 10/2/18-10/12/18 s/p total abdominal colectomy, omentectomy, creation and ileostomy open on 10/6.  Patient with history of ulcerative colitis, type 2 diabetes mellitus, MALISSA, diastolic CHF, persistent A. fib admitted for sigmoid colon obstruction in setting of known ulcerative colitis.  Patient initially treated with IV Zosyn and discontinue on 10/9.  Leukocytosis secondary to steroid noted during hospitalization.  Initially Improving with low pro-calcitonin level.  Patient was discharged with tapering dose of prednisone per GI per previous hospitalist.  Patient was seen by Dr. Grissom over the weekend.  There was concerns for low blood pressure.  Systolics in the 80s.  Her metoprolol was held.  She was also treated for fluid repletion.  BUN and creatinine 1.52.  She was given a liter of fluid. She has completed this. DM on insulin.     Today pt sitting up in bedside chair.  Superior aspect of the incision dry and intact.  Ostomy patent.  Denies any pain in her abdomen.  Incision line moist at the distal aspect secondary to moisture in the folds.  Nursing staff are applying nystatin mix with calmoseptine.  Patient was previously on oral anti-hyperglycemics.  However these were discontinued back in August during hospitalization.  She does continue on NPH insulin twice daily as well as sliding scale.  Family had wanted me to switch her back to orals.  Recent creatinine 1.23.  Blood sugars continue to fluctuate 2-400 in the late evenings.  I have made several adjustments to her insulin during her TCU stay.  Her  helps her with her diabetic medications at home.      Active Ambulatory Problems      Diagnosis Date Noted     DM (diabetes mellitus) (H)      Chronic Kidney Disease (NKF Classification)      Benign essential hypertension      Persistent atrial fibrillation (H)      Chronic diastolic CHF (congestive heart failure) (H)      Sleep apnea      Shortness Of Breath      Hyperlipidemia      GI bleed 10/18/2016     PRB (rectal bleeding)      Anemia due to blood loss, acute      Polyarthralgia 10/10/2017     Primary osteoarthritis involving multiple joints 12/08/2017     Inflammatory polyarthritis (H) 12/08/2017     Chondrocalcinosis 12/08/2017     Insulin dependent diabetes mellitus with complications (H)      Coronary artery disease involving native coronary artery without angina pectoris 04/11/2018     Ulcerative colitis (H) 08/23/2018     Hyperglycemia      Chronic ulcerative colitis with rectal bleeding (H) 09/07/2018     Bloody diarrhea      Lightheadedness      Chronic atrial fibrillation (H)      Diastolic dysfunction      Diabetes mellitus, type II, insulin dependent (H)      History of coronary artery disease      Colon obstruction (H) 10/05/2018     Hypokalemia      Skin tear of forearm without complication, left, initial encounter      Hypomagnesemia      Postprocedural hypotension      Resolved Ambulatory Problems     Diagnosis Date Noted     Supratherapeutic INR      Crohn's disease with complication, unspecified gastrointestinal tract location (H)      Past Medical History:   Diagnosis Date     Atrial fibrillation (H)      Benign essential hypertension      Cardiomyopathy (H)      CHF (congestive heart failure) (H)      Chronic kidney disease      Chronic Kidney Disease (NKF Classification)      Colitis      Diabetes mellitus (H)      Diastolic Congestive Heart Failure      DM (diabetes mellitus) (H)      GI bleed 10/18/2016     Gout      Hyperlipidemia      Hyperlipidemia      Hypertension      Morbid obesity (H)      MALISSA (obstructive sleep apnea)      Persistent atrial fibrillation (H)       PRB (rectal bleeding)      Shortness of breath      Sleep apnea      SOB (shortness of breath)      Urinary incontinence        Current Outpatient Prescriptions   Medication Sig     acetaminophen (ACETAMINOPHEN EXTRA STRENGTH) 500 MG tablet Take 1,000 mg by mouth 2 (two) times a day as needed.      aspirin 81 mg chewable tablet Chew 81 mg daily.     blood sugar diagnostic (GLUCOSE BLOOD) Strp Use As Directed. OneTouch Ultra Blue In Vitro Strip.     escitalopram oxalate (LEXAPRO) 10 MG tablet Take 10 mg by mouth daily.     ferrous sulfate 325 (65 FE) MG tablet Take 1 tablet by mouth daily with breakfast.     insulin lispro (HUMALOG) 100 unit/mL injection Inject 2-10 Units under the skin 3 (three) times a day before meals. Sliding scale (from [facility] records)  150-200 2 units  200-250 4 units  Up to 10 units if > 350     insulin NPH (NOVOLIN) 100 unit/mL injection Inject 30 Units under the skin 2 (two) times a day. Pt taking 30 units in am and 10 units in the evening.     Lactobacillus rhamnosus GG (CULTURELLE) 10-15 Billion cell capsule Take 1 capsule by mouth daily.     omeprazole (PRILOSEC) 20 MG capsule Take 1 capsule (20 mg total) by mouth daily before breakfast.     simvastatin (ZOCOR) 20 MG tablet Take 20 mg by mouth bedtime.        Allergies   Allergen Reactions     Metformin Other (See Comments)     GI symptoms         Review of Systems   No fevers or chills. No headache, lightheadedness or dizziness. No SOB, chest pains or palpitations. Appetite is good. No nausea, vomiting, constipation or diarrhea. She has ileostomy in place which is patent. No dysuria, frequency, burning or pain with urination.Urinary frequency improved.  Denies any pain.  Otherwise review of systems are negative.       Physical Exam   PHYSICAL EXAMINATION:  Vital signs: /86, heart rate 92, respirations 16, temperature 96.9, O2 sat 91% on room air.  Current weight 191pounds.  General: Awake, Alert, oriented x3, appropriately,  follows simple commands, conversant  HEENT:PERRLA, Pink conjunctiva, anicteric sclerae, moist oral mucosa  NECK: Supple, without any lymphadenopathy, or masses  CVS:  S1  S2, without murmur or gallop.   LUNG: Clear to auscultation, No wheezes, rales or rhonci.  BACK: No kyphosis of the thoracic spine  ABDOMEN: Soft,round,  nontender to palpation, with positive bowel sounds. Ileostomy patent.  Incision to the midline intact. . Distal aspect of incision red from moisture in fold.  Nursing staff report redness underneath the wafer.  EXTREMITIES: Good range of motion on both upper and lower extremities with generalized weakness, trace pedal edema, no calf tenderness  SKIN: Red in her abdominal and groin folds.   NEUROLOGIC: Intact, pulses palpable  PSYCHIATRIC: Cognition intact      Labs:    Recent Results (from the past 240 hour(s))   Basic Metabolic Panel   Result Value Ref Range    Sodium 138 136 - 145 mmol/L    Potassium 5.0 3.5 - 5.0 mmol/L    Chloride 109 (H) 98 - 107 mmol/L    CO2 23 22 - 31 mmol/L    Anion Gap, Calculation 6 5 - 18 mmol/L    Glucose 90 70 - 125 mg/dL    Calcium 9.7 8.5 - 10.5 mg/dL    BUN 22 8 - 28 mg/dL    Creatinine 1.23 (H) 0.60 - 1.10 mg/dL    GFR MDRD Af Amer 50 (L) >60 mL/min/1.73m2    GFR MDRD Non Af Amer 41 (L) >60 mL/min/1.73m2   HM1 (CBC with Diff)   Result Value Ref Range    WBC 10.5 4.0 - 11.0 thou/uL    RBC 2.89 (L) 3.80 - 5.40 mill/uL    Hemoglobin 9.2 (L) 12.0 - 16.0 g/dL    Hematocrit 29.5 (L) 35.0 - 47.0 %     (H) 80 - 100 fL    MCH 31.8 27.0 - 34.0 pg    MCHC 31.2 (L) 32.0 - 36.0 g/dL    RDW 15.6 (H) 11.0 - 14.5 %    Platelets 151 140 - 440 thou/uL    MPV 10.7 8.5 - 12.5 fL    Neutrophils % 76 (H) 50 - 70 %    Lymphocytes % 13 (L) 20 - 40 %    Monocytes % 8 2 - 10 %    Eosinophils % 2 0 - 6 %    Basophils % 1 0 - 2 %    Neutrophils Absolute 7.8 (H) 2.0 - 7.7 thou/uL    Lymphocytes Absolute 1.4 0.8 - 4.4 thou/uL    Monocytes Absolute 0.8 0.0 - 0.9 thou/uL    Eosinophils  Absolute 0.3 0.0 - 0.4 thou/uL    Basophils Absolute 0.1 0.0 - 0.2 thou/uL   Basic Metabolic Panel   Result Value Ref Range    Sodium 136 136 - 145 mmol/L    Potassium 4.9 3.5 - 5.0 mmol/L    Chloride 108 (H) 98 - 107 mmol/L    CO2 17 (L) 22 - 31 mmol/L    Anion Gap, Calculation 11 5 - 18 mmol/L    Glucose 193 (H) 70 - 125 mg/dL    Calcium 8.9 8.5 - 10.5 mg/dL    BUN 18 8 - 28 mg/dL    Creatinine 1.15 (H) 0.60 - 1.10 mg/dL    GFR MDRD Af Amer 54 (L) >60 mL/min/1.73m2    GFR MDRD Non Af Amer 45 (L) >60 mL/min/1.73m2   HM2(CBC w/o Differential)   Result Value Ref Range    WBC 9.3 4.0 - 11.0 thou/uL    RBC 2.88 (L) 3.80 - 5.40 mill/uL    Hemoglobin 9.1 (L) 12.0 - 16.0 g/dL    Hematocrit 29.5 (L) 35.0 - 47.0 %     (H) 80 - 100 fL    MCH 31.6 27.0 - 34.0 pg    MCHC 30.8 (L) 32.0 - 36.0 g/dL    RDW 15.7 (H) 11.0 - 14.5 %    Platelets 164 140 - 440 thou/uL    MPV 10.6 8.5 - 12.5 fL         Assessment/Plan:  1. Colonic obstruction (H)     2. Ulcerative colitis (H)     3. S/P colectomy     4. Leukocytosis     5. JOANNE (acute kidney injury) (H)     6. Urinary tract infection     7. Yeast infection of the skin     8. DM (diabetes mellitus) (H)       Patient with a history of ulcerative colitis with colon obstruction status post colectomy with ileostomy. Ileostomy patent.  She denies any abdominal pain.  Previous leukocytosis.  She was treated for UTI.  White count now within normal limits.  She has been weaned off her prednisone.  Moisture in her folds secondary to yeast.  She does have redness at the base of her incision line in her abdominal fold.  She will need to continue with nystatin mixed with calmoseptine times daily.  We will also place ABD pads to help with away moisture.  She also has redness under her wafer.  She will need stoma paste applied with out changes.  Creatinine creeping up again to 1.26.  Continue to encourage fluids.  Diabetes.  She was previously on oral anti-hyperglycemics.  These were  discontinued during hospitalization back in August.  Family had wanted me to switch her back however with elevated creatinine and continued elevated blood sugars 2-400 in the late evenings we will continue with insulin.  I have increased her a.m. NPH insulin to 30 units.  She was having some low blood sugars in the a.m.  I have decreased her afternoon NPH to 10 units.  She continues on sliding scale.  Goal would be to decrease to once a day insulin.  Home care will need to continue to monitor her blood sugars.    Okay to DC home with current meds and treatments.  Home PT, OT, home health aide and RN.  She will need to follow-up with her primary care provider in 1 week.  She will need repeat BMP at that time.  Continue to monitor blood sugars.        Electronically signed by: Smitha Blandon CNP

## 2021-06-21 NOTE — PROGRESS NOTES
Code Status:  FULL CODE  Visit Type: Problem Visit     Facility:  [facility] [[id number]]         Facility Type: SNF (Skilled Nursing Facility, TCU)    History of Present Illness: Ani Castillo is a 85 y.o. female who I am seeing today for follow up on the TCU. Pt recently hospitalized on 10/2/18-10/12/18 s/p total abdominal colectomy, omentectomy, creation and ileostomy open on 10/6.  Patient with history of ulcerative colitis, type 2 diabetes mellitus, MALISSA, diastolic CHF, persistent A. fib admitted for sigmoid colon obstruction in setting of known ulcerative colitis.  Patient iitially treated with IV Zosyn and discontinue on 10/9.  Leukocytosis secondary to steroid noted during hospitalization.  Initially Improving with low pro-calcitonin level.  Patient was discharged with tapering dose of prednisone per GI per previous hospitalist.  Patient was seen by Dr. Grissom over the weekend.  There was concerns for low blood pressure.  Systolics in the 80s.  Her metoprolol was held.  She was also treated for fluid repletion.  BUN and creatinine 1.52.  She was given a liter of fluid. She has completed this.  Today she is sitting up in wheelchair.  She appears stable.  Denies any fever or chills.  Denies any dizziness.  Blood pressures are greatly improved.  Weights are stable.  No shortness of breath or chest pain.  She does continue with elevated white count.  Review of her showed a white count of 13-19.  I did ask nursing staff to have lab add on a pro calcitonin level.  She is currently afebrile.  She is on a wean of her prednisone now 10 mg daily.  Her colostomy is patent.  She denies any abdominal pain.  She is voiding adequately however reports urinary frequency.  Blood sugars continue to be in the 300s.  Review of the records show she was on 25 units in the morning.  Currently now only on 14.        Active Ambulatory Problems     Diagnosis Date Noted     DM (diabetes mellitus) (H)      Chronic  Kidney Disease (NKF Classification)      Benign essential hypertension      Persistent atrial fibrillation (H)      Chronic diastolic CHF (congestive heart failure) (H)      Sleep apnea      Shortness Of Breath      Hyperlipidemia      GI bleed 10/18/2016     PRB (rectal bleeding)      Anemia due to blood loss, acute      Polyarthralgia 10/10/2017     Primary osteoarthritis involving multiple joints 12/08/2017     Inflammatory polyarthritis (H) 12/08/2017     Chondrocalcinosis 12/08/2017     Insulin dependent diabetes mellitus with complications (H)      Coronary artery disease involving native coronary artery without angina pectoris 04/11/2018     Ulcerative colitis (H) 08/23/2018     Hyperglycemia      Chronic ulcerative colitis with rectal bleeding (H) 09/07/2018     Bloody diarrhea      Lightheadedness      Chronic atrial fibrillation (H)      Diastolic dysfunction      Diabetes mellitus, type II, insulin dependent (H)      History of coronary artery disease      Colon obstruction (H) 10/05/2018     Hypokalemia      Skin tear of forearm without complication, left, initial encounter      Hypomagnesemia      Postprocedural hypotension      Resolved Ambulatory Problems     Diagnosis Date Noted     Supratherapeutic INR      Crohn's disease with complication, unspecified gastrointestinal tract location (H)      Past Medical History:   Diagnosis Date     Atrial fibrillation (H)      Benign essential hypertension      Cardiomyopathy (H)      CHF (congestive heart failure) (H)      Chronic kidney disease      Chronic Kidney Disease (NKF Classification)      Colitis      Diabetes mellitus (H)      Diastolic Congestive Heart Failure      DM (diabetes mellitus) (H)      GI bleed 10/18/2016     Gout      Hyperlipidemia      Hyperlipidemia      Hypertension      Morbid obesity (H)      MALISSA (obstructive sleep apnea)      Persistent atrial fibrillation (H)      PRB (rectal bleeding)      Shortness of breath      Sleep apnea       SOB (shortness of breath)      Urinary incontinence        Current Outpatient Prescriptions   Medication Sig     insulin NPH (NOVOLIN) 100 unit/mL injection Inject 16 Units under the skin 2 (two) times a day.     acetaminophen (ACETAMINOPHEN EXTRA STRENGTH) 500 MG tablet Take 1,000 mg by mouth 2 (two) times a day as needed.      aspirin 81 mg chewable tablet Chew 81 mg daily.     blood sugar diagnostic (GLUCOSE BLOOD) Strp Use As Directed. OneTouch Ultra Blue In Vitro Strip.     escitalopram oxalate (LEXAPRO) 10 MG tablet Take 10 mg by mouth daily.     ferrous sulfate 325 (65 FE) MG tablet Take 1 tablet by mouth daily with breakfast.     insulin lispro (HUMALOG) 100 unit/mL injection Inject 2-10 Units under the skin 3 (three) times a day before meals. Sliding scale (from [facility] records)  150-200 2 units  200-250 4 units  Up to 10 units if > 350     Lactobacillus rhamnosus GG (CULTURELLE) 10-15 Billion cell capsule Take 1 capsule by mouth daily.     metoprolol tartrate (LOPRESSOR) 25 MG tablet Take 1 tablet (25 mg total) by mouth 2 (two) times a day. (Patient taking differently: Take 12.5 mg by mouth 2 (two) times a day. )     omeprazole (PRILOSEC) 20 MG capsule Take 1 capsule (20 mg total) by mouth daily before breakfast.     predniSONE (DELTASONE) 10 mg tablet 2 tabs daily for 3 days and 1 tab daily for 10 days.     simvastatin (ZOCOR) 20 MG tablet Take 20 mg by mouth bedtime.        Allergies   Allergen Reactions     Metformin Other (See Comments)     GI symptoms         Review of Systems   No fevers or chills. No headache, lightheadedness or dizziness. No SOB, chest pains or palpitations. Appetite is good. No nausea, vomiting, constipation or diarrhea. She has colostomy in place. No dysuria, frequency, burning or pain with urination.She does report urinary frequency. Denies any pain.  Otherwise review of systems are negative.       Physical Exam   PHYSICAL EXAMINATION:  Vital signs: 137/89, heart rate  83, respirations 20, temperature 96.8, 100% on room air.  Current weight 186 pounds.  General: Awake, Alert, oriented x3, appropriately, follows simple commands, conversant  HEENT:PERRLA, Pink conjunctiva, anicteric sclerae, moist oral mucosa  NECK: Supple, without any lymphadenopathy, or masses  CVS:  S1  S2, without murmur or gallop.   LUNG: Clear to auscultation, No wheezes, rales or rhonci.  BACK: No kyphosis of the thoracic spine  ABDOMEN: Soft,round,  nontender to palpation, with positive bowel sounds.  Colostomy patent.  Incision to the midline intact with staples.  No pain with palpation.  EXTREMITIES: Good range of motion on both upper and lower extremities with generalized weakness, trace pedal edema, no calf tenderness  SKIN: Warm and dry, no rashes or erythema noted  NEUROLOGIC: Intact, pulses palpable  PSYCHIATRIC: Cognition intact      Labs:    Recent Results (from the past 240 hour(s))   Potassium   Result Value Ref Range    Potassium 3.5 3.5 - 5.0 mmol/L   POCT Glucose   Result Value Ref Range    Glucose,  mg/dL   Potassium   Result Value Ref Range    Potassium 3.2 (L) 3.5 - 5.0 mmol/L   Magnesium   Result Value Ref Range    Magnesium 1.6 (L) 1.8 - 2.6 mg/dL   POCT Glucose   Result Value Ref Range    Glucose,  mg/dL   HM2(CBC W/O DIFF)   Result Value Ref Range    WBC 16.6 (H) 4.0 - 11.0 thou/uL    RBC 3.15 (L) 3.80 - 5.40 mill/uL    Hemoglobin 10.2 (L) 12.0 - 16.0 g/dL    Hematocrit 31.0 (L) 35.0 - 47.0 %    MCV 98 80 - 100 fL    MCH 32.4 27.0 - 34.0 pg    MCHC 32.9 32.0 - 36.0 g/dL    RDW 15.7 (H) 11.0 - 14.5 %    Platelets 200 140 - 440 thou/uL    MPV 9.2 8.5 - 12.5 fL   POCT Glucose   Result Value Ref Range    Glucose,  mg/dL   Potassium   Result Value Ref Range    Potassium 4.5 3.5 - 5.0 mmol/L   POCT Glucose   Result Value Ref Range    Glucose,  mg/dL   POCT Glucose   Result Value Ref Range    Glucose,  mg/dL   Magnesium   Result Value Ref Range    Magnesium  2.3 1.8 - 2.6 mg/dL   Potassium   Result Value Ref Range    Potassium 3.4 (L) 3.5 - 5.0 mmol/L   POCT Glucose   Result Value Ref Range    Glucose,  mg/dL   POCT Glucose   Result Value Ref Range    Glucose,  mg/dL   POCT Glucose   Result Value Ref Range    Glucose,  mg/dL   Potassium   Result Value Ref Range    Potassium 4.2 3.5 - 5.0 mmol/L   POCT Glucose   Result Value Ref Range    Glucose,  mg/dL   Magnesium   Result Value Ref Range    Magnesium 2.1 1.8 - 2.6 mg/dL   Potassium - Next AM   Result Value Ref Range    Potassium 4.2 3.5 - 5.0 mmol/L   POCT Glucose   Result Value Ref Range    Glucose,  mg/dL   POCT Glucose   Result Value Ref Range    Glucose,  mg/dL   POCT Glucose   Result Value Ref Range    Glucose, POC 87 mg/dL   POCT Glucose   Result Value Ref Range    Glucose,  mg/dL   HM2(CBC w/o Differential)   Result Value Ref Range    WBC 12.3 (H) 4.0 - 11.0 thou/uL    RBC 3.10 (L) 3.80 - 5.40 mill/uL    Hemoglobin 9.8 (L) 12.0 - 16.0 g/dL    Hematocrit 30.9 (L) 35.0 - 47.0 %     80 - 100 fL    MCH 31.6 27.0 - 34.0 pg    MCHC 31.7 (L) 32.0 - 36.0 g/dL    RDW 15.4 (H) 11.0 - 14.5 %    Platelets 240 140 - 440 thou/uL    MPV 9.6 8.5 - 12.5 fL   Basic Metabolic Panel   Result Value Ref Range    Sodium 137 136 - 145 mmol/L    Potassium 4.7 3.5 - 5.0 mmol/L    Chloride 102 98 - 107 mmol/L    CO2 28 22 - 31 mmol/L    Anion Gap, Calculation 7 5 - 18 mmol/L    Glucose 335 (H) 70 - 125 mg/dL    Calcium 9.0 8.5 - 10.5 mg/dL    BUN 12 8 - 28 mg/dL    Creatinine 1.03 0.60 - 1.10 mg/dL    GFR MDRD Af Amer >60 >60 mL/min/1.73m2    GFR MDRD Non Af Amer 51 (L) >60 mL/min/1.73m2   Magnesium   Result Value Ref Range    Magnesium 1.7 (L) 1.8 - 2.6 mg/dL   POCT Glucose   Result Value Ref Range    Glucose,  mg/dL   POCT Glucose   Result Value Ref Range    Glucose,  mg/dL   POCT Glucose   Result Value Ref Range    Glucose,  mg/dL   POCT Glucose   Result Value  Ref Range    Glucose,  mg/dL   Potassium - Next AM   Result Value Ref Range    Potassium 4.2 3.5 - 5.0 mmol/L   Magnesium   Result Value Ref Range    Magnesium 1.7 (L) 1.8 - 2.6 mg/dL   Renal Function Profile   Result Value Ref Range    Albumin 2.6 (L) 3.5 - 5.0 g/dL    Calcium 10.0 8.5 - 10.5 mg/dL    Phosphorus 2.3 (L) 2.5 - 4.5 mg/dL    Glucose 199 (H) 70 - 125 mg/dL    BUN 15 8 - 28 mg/dL    Creatinine 0.94 0.60 - 1.10 mg/dL    Sodium 137 136 - 145 mmol/L    Potassium 4.2 3.5 - 5.0 mmol/L    Chloride 101 98 - 107 mmol/L    CO2 25 22 - 31 mmol/L    Anion Gap, Calculation 11 5 - 18 mmol/L    GFR MDRD Af Amer >60 >60 mL/min/1.73m2    GFR MDRD Non Af Amer 57 (L) >60 mL/min/1.73m2   HM1 (CBC with Diff)   Result Value Ref Range    WBC 14.1 (H) 4.0 - 11.0 thou/uL    RBC 3.08 (L) 3.80 - 5.40 mill/uL    Hemoglobin 9.9 (L) 12.0 - 16.0 g/dL    Hematocrit 30.7 (L) 35.0 - 47.0 %     80 - 100 fL    MCH 32.1 27.0 - 34.0 pg    MCHC 32.2 32.0 - 36.0 g/dL    RDW 14.9 (H) 11.0 - 14.5 %    Platelets 277 140 - 440 thou/uL    MPV 9.9 8.5 - 12.5 fL    Neutrophils % 82 (H) 50 - 70 %    Lymphocytes % 10 (L) 20 - 40 %    Monocytes % 6 2 - 10 %    Eosinophils % 2 0 - 6 %    Basophils % 1 0 - 2 %    Neutrophils Absolute 11.0 (H) 2.0 - 7.7 thou/uL    Lymphocytes Absolute 1.4 0.8 - 4.4 thou/uL    Monocytes Absolute 0.9 0.0 - 0.9 thou/uL    Eosinophils Absolute 0.2 0.0 - 0.4 thou/uL    Basophils Absolute 0.1 0.0 - 0.2 thou/uL   POCT Glucose   Result Value Ref Range    Glucose,  mg/dL   POCT Glucose   Result Value Ref Range    Glucose,  mg/dL   Procalcitonin   Result Value Ref Range    Procalcitonin 0.21 0.00 - 0.49 ng/mL   POCT Glucose   Result Value Ref Range    Glucose,  mg/dL   POCT Glucose   Result Value Ref Range    Glucose,  mg/dL   Magnesium   Result Value Ref Range    Magnesium 1.8 1.8 - 2.6 mg/dL   Renal Function Profile   Result Value Ref Range    Albumin 2.7 (L) 3.5 - 5.0 g/dL    Calcium  9.7 8.5 - 10.5 mg/dL    Phosphorus 3.1 2.5 - 4.5 mg/dL    Glucose 153 (H) 70 - 125 mg/dL    BUN 21 8 - 28 mg/dL    Creatinine 1.03 0.60 - 1.10 mg/dL    Sodium 139 136 - 145 mmol/L    Potassium 4.2 3.5 - 5.0 mmol/L    Chloride 104 98 - 107 mmol/L    CO2 25 22 - 31 mmol/L    Anion Gap, Calculation 10 5 - 18 mmol/L    GFR MDRD Af Amer >60 >60 mL/min/1.73m2    GFR MDRD Non Af Amer 51 (L) >60 mL/min/1.73m2   Procalcitonin   Result Value Ref Range    Procalcitonin 0.14 0.00 - 0.49 ng/mL   HM1 (CBC with Diff)   Result Value Ref Range    WBC 13.2 (H) 4.0 - 11.0 thou/uL    RBC 3.13 (L) 3.80 - 5.40 mill/uL    Hemoglobin 9.8 (L) 12.0 - 16.0 g/dL    Hematocrit 30.8 (L) 35.0 - 47.0 %    MCV 98 80 - 100 fL    MCH 31.3 27.0 - 34.0 pg    MCHC 31.8 (L) 32.0 - 36.0 g/dL    RDW 15.0 (H) 11.0 - 14.5 %    Platelets 243 140 - 440 thou/uL    MPV 9.4 8.5 - 12.5 fL   Manual Differential   Result Value Ref Range    Total Neutrophils % 76 (H) 50 - 70 %    Lymphocytes % 14 (L) 20 - 40 %    Monocytes % 4 2 - 10 %    Eosinophils %  2 0 - 6 %    Basophils % 1 0 - 2 %    Metamyelocytes % 2 (H) <=1 %    Myelocytes % 3 (H) <=1 %    Total Neutrophils Absolute 10.0 (H) 2.0 - 7.7 thou/ul    Lymphocytes Absolute 1.8 0.8 - 4.4 thou/uL    Monocytes Absolute 0.5 0.0 - 0.9 thou/uL    Eosinophils Absolute 0.3 0.0 - 0.4 thou/uL    Basophils Absolute 0.1 0.0 - 0.2 thou/uL    Metamyelocytes Absolute 0.2 (H) <=0.1 thou/uL    Myelocytes Absolute 0.3 (H) <=0.1 thou/uL    Platelet Estimate Normal Normal    Ovalocytes 1+ (!) Negative   POCT Glucose   Result Value Ref Range    Glucose,  mg/dL   POCT Glucose   Result Value Ref Range    Glucose,  mg/dL   Electrolyte Profile   Result Value Ref Range    Sodium 137 136 - 145 mmol/L    Potassium 5.5 (H) 3.5 - 5.0 mmol/L    CO2 28 22 - 31 mmol/L    Chloride 99 98 - 107 mmol/L    Anion Gap, Calculation 10 5 - 18 mmol/L   Magnesium   Result Value Ref Range    Magnesium 2.2 1.8 - 2.6 mg/dL   Renal Function  Profile   Result Value Ref Range    Albumin 3.2 (L) 3.5 - 5.0 g/dL    Calcium 10.6 (H) 8.5 - 10.5 mg/dL    Phosphorus 4.1 2.5 - 4.5 mg/dL    Glucose 204 (H) 70 - 125 mg/dL    BUN 34 (H) 8 - 28 mg/dL    Creatinine 1.57 (H) 0.60 - 1.10 mg/dL    Sodium 137 136 - 145 mmol/L    Potassium 5.5 (H) 3.5 - 5.0 mmol/L    Chloride 99 98 - 107 mmol/L    CO2 28 22 - 31 mmol/L    Anion Gap, Calculation 10 5 - 18 mmol/L    GFR MDRD Af Amer 38 (L) >60 mL/min/1.73m2    GFR MDRD Non Af Amer 31 (L) >60 mL/min/1.73m2   HM1 (CBC with Diff)   Result Value Ref Range    WBC 19.1 (H) 4.0 - 11.0 thou/uL    RBC 3.60 (L) 3.80 - 5.40 mill/uL    Hemoglobin 11.3 (L) 12.0 - 16.0 g/dL    Hematocrit 37.0 35.0 - 47.0 %     (H) 80 - 100 fL    MCH 31.4 27.0 - 34.0 pg    MCHC 30.5 (L) 32.0 - 36.0 g/dL    RDW 14.9 (H) 11.0 - 14.5 %    Platelets 308 140 - 440 thou/uL    MPV 10.0 8.5 - 12.5 fL   Basic Metabolic Panel   Result Value Ref Range    Sodium 138 136 - 145 mmol/L    Potassium 5.5 (H) 3.5 - 5.0 mmol/L    Chloride 99 98 - 107 mmol/L    CO2 28 22 - 31 mmol/L    Anion Gap, Calculation 11 5 - 18 mmol/L    Glucose 201 (H) 70 - 125 mg/dL    Calcium 10.5 8.5 - 10.5 mg/dL    BUN 34 (H) 8 - 28 mg/dL    Creatinine 1.59 (H) 0.60 - 1.10 mg/dL    GFR MDRD Af Amer 37 (L) >60 mL/min/1.73m2    GFR MDRD Non Af Amer 31 (L) >60 mL/min/1.73m2   Manual Differential   Result Value Ref Range    Total Neutrophils % 82 (H) 50 - 70 %    Lymphocytes % 5 (L) 20 - 40 %    Monocytes % 7 2 - 10 %    Eosinophils %  3 0 - 6 %    Basophils % 0 0 - 2 %    Metamyelocytes % 3 (H) <=1 %    Myelocytes % 1 <=1 %    Total Neutrophils Absolute 15.6 (H) 2.0 - 7.7 thou/ul    Lymphocytes Absolute 1.0 0.8 - 4.4 thou/uL    Monocytes Absolute 1.3 (H) 0.0 - 0.9 thou/uL    Eosinophils Absolute 0.6 (H) 0.0 - 0.4 thou/uL    Basophils Absolute 0.0 0.0 - 0.2 thou/uL    Metamyelocytes Absolute 0.6 (H) <=0.1 thou/uL    Myelocytes Absolute 0.1 <=0.1 thou/uL    Manual nRBC per 100 Cells 1 (H) 0-<1     Platelet Estimate Normal Normal    Ovalocytes 1+ (!) Negative    Polychromasia 1+ (!) Negative    Tear Drop Cells 1+ (!) Negative   Basic Metabolic Panel   Result Value Ref Range    Sodium 132 (L) 136 - 145 mmol/L    Potassium 4.7 3.5 - 5.0 mmol/L    Chloride 100 98 - 107 mmol/L    CO2 22 22 - 31 mmol/L    Anion Gap, Calculation 10 5 - 18 mmol/L    Glucose 360 (H) 70 - 125 mg/dL    Calcium 9.4 8.5 - 10.5 mg/dL    BUN 35 (H) 8 - 28 mg/dL    Creatinine 1.63 (H) 0.60 - 1.10 mg/dL    GFR MDRD Af Amer 36 (L) >60 mL/min/1.73m2    GFR MDRD Non Af Amer 30 (L) >60 mL/min/1.73m2   Urinalysis-UC if Indicated   Result Value Ref Range    Color, UA Yellow Colorless, Yellow, Straw, Light Yellow    Clarity, UA Turbid (!) Clear    Glucose,  mg/dL (!) Negative    Bilirubin, UA Negative Negative    Ketones, UA Negative Negative    Specific Gravity, UA 1.025 1.001 - 1.030    Blood, UA Small (!) Negative    pH, UA 5.5 4.5 - 8.0    Protein, UA 50 mg/dL (!) Negative mg/dL    Urobilinogen, UA <2.0 E.U./dL <2.0 E.U./dL, 2.0 E.U./dL    Nitrite, UA Negative Negative    Leukocytes, UA Large (!) Negative    Bacteria, UA Many (!) None Seen hpf    RBC, UA 0-2 None Seen, 0-2 hpf    WBC, UA >100 (!) None Seen, 0-5 hpf    Squam Epithel, UA 0-5 None Seen, 0-5 lpf    WBC Clumps Present (!) None Seen    Mucus, UA Few (!) None Seen lpf         Assessment/Plan:  1. Colonic obstruction (H)     2. S/P colectomy     3. Ileostomy bag changed (H)     4. Hypotension     5. Ulcerative colitis (H)     6. JOANNE (acute kidney injury) (H)     7. Leukocytosis       Patient with a history of ulcerative colitis with colon obstruction status post colectomy with ileostomy.  Colostomy patent.  She denies any abdominal pain.  Acute kidney injury secondary to hypo-tension.  She was given 1 L of IV fluids.  Sodium continues to be low.  Creatinine now 1.59.  BUN 35.  Will give additional 1 L of fluids.  Monitor daily weights.  Leukocytosis.  This was thought to be  steroid related.  However white count up from 13-19.  She is afebrile.  She is complaining of urinary frequency.  UA UC obtained today.  UA appears positive.  Large leukocytes.  Many bacteria.  Culture is turbid.  We will give her 1 dose of IM Rocephin tonight.  Will repeat laboratory including BMP and CBC in the a.m.  I did ask them to obtain pro-calcitonin today.  Will follow up.  She is on a steroid wean.  Only on 10 mg a day.  Blood sugars continue to be elevated in the 300s.  I did look back in her records.  She was previously on 25 units of Humulin NPH in the a.m.  I will increase her insulin to 16 units twice daily.  Currently only on 14 daily.  Continue to monitor blood sugar checks.  She is also on sliding scale.  Blood pressure stable.  Heart rate stable.  Dr. Grissom did decrease her metoprolol.  Will continue to monitor.        60 minutes spent of which greater than 50% was face to face communication with the patient about above plan of care    Electronically signed by: Smitha Blandon, BARNEY

## 2021-06-23 NOTE — PROGRESS NOTES
ASSESSMENT AND PLAN:  Ani Castillo 85 y.o. female is seen here on 01/28/19 for follow-up of polyarthralgias in the background of Crohn's.  She presents after a long interval, with acute inflammatory arthropathy affecting multiple joints especially in her right hand, status post colon surgery last October since when she has not had prednisone.  She is given various options.  On the following plan is obtained.  He will start prednisone 15 mg, for 2 weeks and see if she can stop at that point.  She will keep the rest of 7 days worth should happen over the weekend.  If the flareups happen infrequently i.e. no more than once or twice a year short course of prednisone may be an acceptable choice more frequent episodes will then necessitate taking additional/alternative measures.  Today we discussed that with her diabetes she is aware that her blood glucose will rise and with her renal impairment she is aware not to take nonsteroidals.  We will meet here in 3 months.        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  This is affecting her right hand.  This is gone on for 4 days.  She cannot use the hand for anything for months the day-to-day activities, accompanied by her daughter who is a  in English, and on the phone is her the daughter who is an RN.  She rates the pain 10/10.  No other joint area similarly affected.  She is known to have Crohn's in the background for which she has had surgery recently.  The last prednisone she took was in October of last year.  She has  chondrocalcinosis.   As she reports that she has had 3 knee surgeries.  She had what sounds like hemiarthroplasty of each knee and then a total replacement of the left knee.  She uses a brace for the right knee.  No other joint areas are similarly affected.  When she was having this acute phase she did not have any significant thigh, hip area or low back pain.  She did not have jaw claudication double vision or headaches.  There is no family history of psoriasis, rheumatoid arthritis.  Further historical information and ADL limitations as noted in the multidimensional health assessment questionnaire attached in the EMR.       Following is the excerpt from a recent note:   follow-up of painful joints affecting her upper extremities.  On her previous visit she was on 20 mg of prednisone.  With this her joint symptoms had disappeared.  She had been tapering as instructed and was down to 5 mg.  During this time she did not have recurrence of joint pains.  She however developed flareup of Crohn's with rectal bleed.  Her gastroenterologist and give her 40 mg of prednisone.  She gets joint pains toward the end of the day with activity.  She has no pain in the morning.  There is no stiffness.  She has not had any fever or weight loss blurry vision rash or mouth also.  She reports long-standing history of Crohn's.  She was on Remicade.  Prior to that she has had a joint replacement such as in the knees as noted later and had evidence of osteoarthritis.  However as she changed from Remicade to her current infusion for Crohn's all of a sudden she started developing pain.  This was severe.  This affected her upper extremities.  She had swelling of the hands.  She reached a point where she could not use her upper extremities she felt as if she was paralyzed.  She was given prednisone 20 mg by her gastroenterologist.  Within a matter of less than a day her symptoms resolved back to normal self.  She is not today.  She loves  prednisone because of this.  She cannot remember such episode happening with her joints in the past.  She holliday however had taken prednisone in the past for Crohn's.ALLERGIES:Metformin    PAST MEDICAL/ACTIVE PROBLEMS/MEDICATION/ FAMILY HISTORY/SOCIAL DATA:  The patient has a family history of  Past Medical History:   Diagnosis Date     Atrial fibrillation (H)      Benign essential hypertension     Created by Conversion      Cardiomyopathy (H)      CHF (congestive heart failure) (H)      Chronic kidney disease     ckd     Chronic Kidney Disease (NKF Classification)     Created by Conversion      Colitis      Diabetes mellitus (H)      Diastolic Congestive Heart Failure     Created by Conversion  Replacement Utility updated for latest IMO load     DM (diabetes mellitus) (H)     Created by Conversion      GI bleed 10/18/2016     Gout      Hyperlipidemia      Hyperlipidemia     Created by Conversion      Hypertension      Morbid obesity (H)      MALISSA (obstructive sleep apnea)      Persistent atrial fibrillation (H)      [id number] score of 6 and on chronic warfarin Cardioversion 11/10,  7/11asymptomatic and on rate control       PRB (rectal bleeding)      Shortness of breath     Created by Conversion      Sleep apnea     uses a CPAP     SOB (shortness of breath)      Urinary incontinence      Social History     Tobacco Use   Smoking Status Never Smoker   Smokeless Tobacco Never Used     Patient Active Problem List   Diagnosis     DM (diabetes mellitus) (H)     Chronic Kidney Disease (NKF Classification)     Benign essential hypertension     Persistent atrial fibrillation (H)     Chronic diastolic CHF (congestive heart failure) (H)     Sleep apnea     Shortness of breath     Hyperlipidemia     GI bleed     PRB (rectal bleeding)     Anemia due to blood loss, acute     Polyarthralgia     Primary osteoarthritis involving multiple joints     Inflammatory polyarthritis (H)     Chondrocalcinosis     Insulin dependent diabetes  mellitus with complications (H)     Coronary artery disease involving native coronary artery without angina pectoris     Ulcerative colitis (H)     Hyperglycemia     Chronic ulcerative colitis with rectal bleeding (H)     Bloody diarrhea     Lightheadedness     Chronic atrial fibrillation (H)     Diastolic dysfunction     Diabetes mellitus, type II, insulin dependent (H)     History of coronary artery disease     Colon obstruction (H)     Hypokalemia     Skin tear of forearm without complication, left, initial encounter     Hypomagnesemia     Postprocedural hypotension     Urinary tract infection     Hyperkalemia     JOANNE (acute kidney injury) (H)     Metabolic acidosis     Hyponatremia     Acute encephalopathy     Hallucinations     Abnormal CT of the head     Cognitive and behavioral changes     Adjustment disorder with anxious mood     Current Outpatient Medications   Medication Sig Dispense Refill     acetaminophen (ACETAMINOPHEN EXTRA STRENGTH) 500 MG tablet Take 1,000 mg by mouth 2 (two) times a day as needed.        aspirin 81 mg chewable tablet Chew 81 mg daily.       blood sugar diagnostic (GLUCOSE BLOOD) Strp Use As Directed. OneTouch Ultra Blue In Vitro Strip.       escitalopram oxalate (LEXAPRO) 10 MG tablet Take 10 mg by mouth daily.       ferrous sulfate 325 (65 FE) MG tablet Take 1 tablet by mouth daily with breakfast.       insulin lispro (HUMALOG KWIKPEN) 100 unit/mL pen Inject 4 Units under the skin 3 (three) times a day with meals Hold dose if you don't eat. 5 adj dose pen prn     LANTUS SOLOSTAR U-100 INSULIN 100 unit/mL (3 mL) pen Inject 30 Units under the skin daily with breakfast. 5 adj dose pen PRN     magnesium oxide (MAG-OX) 400 mg (241.3 mg magnesium) tablet Take 400 mg by mouth daily.       simvastatin (ZOCOR) 20 MG tablet Take 20 mg by mouth every morning .             No current facility-administered medications for this visit.        DETAILED EXAMINATION  01/28/19  :  There were no  vitals filed for this visit.  Alert oriented. Head including the face is examined for malar rash, heliotropes, scarring, lupus pernio. Eyes examined for redness such as in episcleritis/scleritis, periorbital lesions.   Neck/ Face examined for parotid gland swelling, range of motion of neck.  Left upper and lower and right upper and lower extremities examined for tenderness, swelling, warmth of the appendicular joints, range of motion, edema, rash.  Some of the important findings included: Acutely inflamed right wrist full metacarpophalangeal joints, PIPs of the right hand.  Heberden nodes with associated deformities.  Squaring of the first CMC's.  Prior knee surgeries. No appreciable synovitis in any of the other palpable upper extremity joints.  Near complete range of motion the shoulders.  LAB / IMAGING DATA:  ALT   Date Value Ref Range Status   12/01/2018 10 0 - 45 U/L Final   11/27/2018 12 0 - 45 U/L Final   11/05/2018 <9 0 - 45 U/L Final     Albumin   Date Value Ref Range Status   01/11/2019 3.4 (L) 3.5 - 5.0 g/dL Final   12/13/2018 3.5 3.5 - 5.0 g/dL Final   12/01/2018 4.2 3.5 - 5.0 g/dL Final     Creatinine   Date Value Ref Range Status   01/11/2019 1.30 (H) 0.60 - 1.10 mg/dL Final   12/13/2018 1.48 (H) 0.60 - 1.10 mg/dL Final   12/06/2018 1.36 (H) 0.60 - 1.10 mg/dL Final       WBC   Date Value Ref Range Status   12/06/2018 9.2 4.0 - 11.0 thou/uL Final   12/04/2018 9.1 4.0 - 11.0 thou/uL Final     Hemoglobin   Date Value Ref Range Status   12/06/2018 11.0 (L) 12.0 - 16.0 g/dL Final   12/04/2018 11.5 (L) 12.0 - 16.0 g/dL Final   12/03/2018 11.2 (L) 12.0 - 16.0 g/dL Final     Platelets   Date Value Ref Range Status   12/06/2018 132 (L) 140 - 440 thou/uL Final   12/05/2018 153 140 - 440 thou/uL Final   12/04/2018 159 140 - 440 thou/uL Final       Lab Results   Component Value Date    RF <15.0 10/10/2017    SEDRATE 107 (H) 08/23/2018

## 2021-06-24 ENCOUNTER — RECORDS - HEALTHEAST (OUTPATIENT)
Dept: ADMINISTRATIVE | Facility: OTHER | Age: 86
End: 2021-06-24

## 2021-06-24 ENCOUNTER — RECORDS - HEALTHEAST (OUTPATIENT)
Dept: SCHEDULING | Facility: CLINIC | Age: 86
End: 2021-06-24

## 2021-06-24 DIAGNOSIS — M25.561 RIGHT KNEE PAIN: ICD-10-CM

## 2021-06-24 NOTE — PATIENT INSTRUCTIONS - HE
"OUTPATIENT OSTOMY INSTRUCTIONS    Type of Stoma: Ileostomy    Supplier: [employer]- [phone]    Equipment:   Product # Brand Description   Pouch [id number] [place] 1-3/8\" convex   Flange      Paste [id number] Coloplast 1-3/8\" protective seal   Powder      Deodorizer [id number] [place] Lubricating Deodorant   Skin barrier [id number] 3M Cavilon no-sting           Procedure:  Close the bottom of the pouch.  If needed cut the opening of your pouch to the correct size and then remove backings from adhesive surfaces.  Remove the soiled pouch and discard.  Wash skin with water and dry.  Then apply skin barrier to surrounding skin and allow to air dry.  Apply ring: Around stoma  Then: Apply pouching system to stoma site and hold in place for 2-5 minutes.    Pouch change: twice weekly  Wound Care (not under pouching system): NA  _____________________________________________________________________    [facility] Nurse Specialist: Saskia Pinzon RN CWOCN     Questions: [phone]      I have received a copy of these instructions, have had an opportunity to ask questions, and have had them answered to my satisfaction.    ________________________________________________________________  Patient Signature and Date    Follow-up Appointment: 1 year  To schedule an appointment call [facility] Central Scheduling at [phone]     "

## 2021-06-24 NOTE — PROGRESS NOTES
"ASSESSMENT AND PLAN:  Ani Castillo 85 y.o. female is seen here on 03/13/19 for follow-up.  She has inflammatory polyarthritis and background this, as she ran out of prednisone she had prompt recurrence of her joint symptoms.  Currently on 10 mg daily.  Today we had detailed discussion her daughter Rufina who is just her nurse accompanies her today.  Of the various options with the following plan is obtained.  She is going to drop prednisone to 7-1/2 and taper over the next 90 days down to 0 as noted.  Along the way if she flares up then she would consider DMARD's.  Depending on if and when that happens the choice amongst various DMARD may then be considered.  She may yet require a recheck of auto antibodies which were done in back in 2017 when these were all normal.  We will meet here in 3 months or sooner.        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  She has inflammatory arthropathy.  This is in association with Crohn's.  She has negative WILLIAMS anti-CCP antibody rheumatoid factor.  She has osteoarthritis.  She noted near complete resolution with prednisone.  As she ran out of the prescription she had a recurrence especially in the left upper extremity joints.  She is now back on 10 mg daily which is been very helpful she would rather stay on prednisone for \"rest of my life\".  She is leery of taking any further medications.  She noted mild discomfort in her hands, able to do most of her day-to-day activities without or with some difficulty no stiffness in the morning.  She is not in any pharmacotherapy for her Crohn's.  She is known to have Crohn's in the background for which she has had surgery recently.  The last prednisone " she took was in October of last year.  She has chondrocalcinosis.   As she reports that she has had 3 knee surgeries.  She had what sounds like hemiarthroplasty of each knee and then a total replacement of the left knee.  She uses a brace for the right knee.  No other joint areas are similarly affected.  When she was having this acute phase she did not have any significant thigh, hip area or low back pain.  She did not have jaw claudication double vision or headaches.  There is no family history of psoriasis, rheumatoid arthritis.  Further historical information and ADL limitations as noted in the multidimensional health assessment questionnaire attached in the EMR.       Following is the excerpt from a recent note:   follow-up of painful joints affecting her upper extremities.  On her previous visit she was on 20 mg of prednisone.  With this her joint symptoms had disappeared.  She had been tapering as instructed and was down to 5 mg.  During this time she did not have recurrence of joint pains.  She however developed flareup of Crohn's with rectal bleed.  Her gastroenterologist and give her 40 mg of prednisone.  She gets joint pains toward the end of the day with activity.  She has no pain in the morning.  There is no stiffness.  She has not had any fever or weight loss blurry vision rash or mouth also.  She reports long-standing history of Crohn's.  She was on Remicade.  Prior to that she has had a joint replacement such as in the knees as noted later and had evidence of osteoarthritis.  However as she changed from Remicade to her current infusion for Crohn's all of a sudden she started developing pain.  This was severe.  This affected her upper extremities.  She had swelling of the hands.  She reached a point where she could not use her upper extremities she felt as if she was paralyzed.  She was given prednisone 20 mg by her gastroenterologist.  Within a matter of less than a day her symptoms resolved back to  normal self.  She is not today.  She loves prednisone because of this.  She cannot remember such episode happening with her joints in the past.  She holliday however had taken prednisone in the past for Crohn's.ALLERGIES:Metformin    PAST MEDICAL/ACTIVE PROBLEMS/MEDICATION/ FAMILY HISTORY/SOCIAL DATA:  The patient has a family history of  Past Medical History:   Diagnosis Date     Atrial fibrillation (H)      Benign essential hypertension     Created by Conversion      Cardiomyopathy (H)      CHF (congestive heart failure) (H)      Chronic kidney disease     ckd     Chronic Kidney Disease (NKF Classification)     Created by Conversion      Colitis      Diabetes mellitus (H)      Diastolic Congestive Heart Failure     Created by Conversion  Replacement Utility updated for latest IMO load     DM (diabetes mellitus) (H)     Created by Conversion      GI bleed 10/18/2016     Gout      Hyperlipidemia      Hyperlipidemia     Created by Conversion      Hypertension      Morbid obesity (H)      MALISSA (obstructive sleep apnea)      Persistent atrial fibrillation (H)      [id number] score of 6 and on chronic warfarin Cardioversion 11/10,  7/11asymptomatic and on rate control       PRB (rectal bleeding)      Shortness of breath     Created by Conversion      Sleep apnea     uses a CPAP     SOB (shortness of breath)      Urinary incontinence      Social History     Tobacco Use   Smoking Status Never Smoker   Smokeless Tobacco Never Used     Patient Active Problem List   Diagnosis     DM (diabetes mellitus) (H)     Chronic Kidney Disease (NKF Classification)     Benign essential hypertension     Persistent atrial fibrillation (H)     Chronic diastolic CHF (congestive heart failure) (H)     Sleep apnea     Shortness of breath     Hyperlipidemia     GI bleed     PRB (rectal bleeding)     Anemia due to blood loss, acute     Polyarthralgia     Primary osteoarthritis involving multiple joints     Inflammatory polyarthritis (H)      Chondrocalcinosis     Insulin dependent diabetes mellitus with complications (H)     Coronary artery disease involving native coronary artery without angina pectoris     Ulcerative colitis (H)     Hyperglycemia     Chronic ulcerative colitis with rectal bleeding (H)     Bloody diarrhea     Lightheadedness     Chronic atrial fibrillation (H)     Diastolic dysfunction     Diabetes mellitus, type II, insulin dependent (H)     History of coronary artery disease     Colon obstruction (H)     Hypokalemia     Skin tear of forearm without complication, left, initial encounter     Hypomagnesemia     Postprocedural hypotension     Urinary tract infection     Hyperkalemia     JOANNE (acute kidney injury) (H)     Metabolic acidosis     Hyponatremia     Acute encephalopathy     Hallucinations     Abnormal CT of the head     Cognitive and behavioral changes     Adjustment disorder with anxious mood     Current Outpatient Medications   Medication Sig Dispense Refill     acetaminophen (ACETAMINOPHEN EXTRA STRENGTH) 500 MG tablet Take 1,000 mg by mouth 2 (two) times a day as needed.        aspirin 81 mg chewable tablet Chew 81 mg daily.       blood sugar diagnostic (GLUCOSE BLOOD) Strp Use As Directed. OneTouch Ultra Blue In Vitro Strip.       escitalopram oxalate (LEXAPRO) 10 MG tablet Take 10 mg by mouth daily.       ferrous sulfate 325 (65 FE) MG tablet Take 1 tablet by mouth daily with breakfast.       insulin lispro (HUMALOG KWIKPEN) 100 unit/mL pen Inject 4 Units under the skin 3 (three) times a day with meals Hold dose if you don't eat. 5 adj dose pen prn     LANTUS SOLOSTAR U-100 INSULIN 100 unit/mL (3 mL) pen Inject 30 Units under the skin daily with breakfast. 5 adj dose pen PRN     magnesium oxide (MAG-OX) 400 mg (241.3 mg magnesium) tablet Take 400 mg by mouth daily.       predniSONE (DELTASONE) 10 mg tablet Take 10 mg by mouth daily.       simvastatin (ZOCOR) 20 MG tablet Take 20 mg by mouth every morning .             No  current facility-administered medications for this visit.        DETAILED EXAMINATION  03/13/19  :  Vitals:    03/13/19 1549   BP: 114/78   Patient Site: Right Arm   Patient Position: Sitting   Cuff Size: Adult Large   Pulse: (!) 56   Weight: 193 lb (87.5 kg)     Alert oriented. Head including the face is examined for malar rash, heliotropes, scarring, lupus pernio. Eyes examined for redness such as in episcleritis/scleritis, periorbital lesions.   Neck/ Face examined for parotid gland swelling, range of motion of neck.  Left upper and lower and right upper and lower extremities examined for tenderness, swelling, warmth of the appendicular joints, range of motion, edema, rash.  Some of the important findings included: She has marked Heberden's with deformity but no synovitis anymore and palpable upper extremity joints.  Bilateral TKA.  Squaring of the first CMC's.    LAB / IMAGING DATA:  ALT   Date Value Ref Range Status   12/01/2018 10 0 - 45 U/L Final   11/27/2018 12 0 - 45 U/L Final   11/05/2018 <9 0 - 45 U/L Final     Albumin   Date Value Ref Range Status   01/11/2019 3.4 (L) 3.5 - 5.0 g/dL Final   12/13/2018 3.5 3.5 - 5.0 g/dL Final   12/01/2018 4.2 3.5 - 5.0 g/dL Final     Creatinine   Date Value Ref Range Status   01/11/2019 1.30 (H) 0.60 - 1.10 mg/dL Final   12/13/2018 1.48 (H) 0.60 - 1.10 mg/dL Final   12/06/2018 1.36 (H) 0.60 - 1.10 mg/dL Final       WBC   Date Value Ref Range Status   12/06/2018 9.2 4.0 - 11.0 thou/uL Final   12/04/2018 9.1 4.0 - 11.0 thou/uL Final     Hemoglobin   Date Value Ref Range Status   12/06/2018 11.0 (L) 12.0 - 16.0 g/dL Final   12/04/2018 11.5 (L) 12.0 - 16.0 g/dL Final   12/03/2018 11.2 (L) 12.0 - 16.0 g/dL Final     Platelets   Date Value Ref Range Status   12/06/2018 132 (L) 140 - 440 thou/uL Final   12/05/2018 153 140 - 440 thou/uL Final   12/04/2018 159 140 - 440 thou/uL Final       Lab Results   Component Value Date    RF <15.0 10/10/2017    SEDRATE 107 (H) 08/23/2018

## 2021-06-24 NOTE — TELEPHONE ENCOUNTER
Sampson    In regards of: predniSONE (DELTASONE) 5 MG tablet    update on medications. last dose was on rajath 11th. having pain in left arm/shoulder. resumed 15mg daily today 2.15. only has 1 week left of medication.   was mentioned a possible alternative medication.     Please call @ [phone], ok to Lisa hill child

## 2021-06-24 NOTE — TELEPHONE ENCOUNTER
Dr Braden recommends pt reduce prednisone to 10mg daily and remain on this dose for three weeks than then follow up in clinic with him to reevaluate symptoms. pts Daughter Lisa notified and verbalized understanding. Scheduled pt to come in 3/13/19 at 3:30pm to see Dr Braden. Prednisone refill sent to pharmacy.

## 2021-06-25 NOTE — PROGRESS NOTES
Progress Notes by Rony Verma MD at 10/5/2017 10:50 AM     Author: Rony Verma MD Service: -- Author Type: Physician    Filed: 10/5/2017 11:31 AM Encounter Date: 10/5/2017 Status: Signed    : oRny Verma MD (Physician)           Click to link to [facility] HEART CARE NOTE    Thank you, Dr. Gill, for asking us to see Ani Castillo at the [facility].      Assessment/Recommendations   Patient with known diastolic heart failure, worsening shortness of breath over the last several months.  She admits to being sedentary and this certainly could be contributing but I would be concerned about either worsening diastolic heart failure or ulnar issues.    We will check a B natruretic peptide today and it chest x-rays I cannot see that she has had any imaging of her chest in the last years.  I would have a low threshold to send her for a CT scan or discuss that possibility with her primary care physician.    Thank you for allowing us to participate in her care.         History of Present Illness    Ms. Ani Castillo is a 84 y.o. female with known diastolic heart failure with documented elevation of her left ventricular end-diastolic pressure.  She had mild coronary artery disease.  She also has permanent atrial fibrillation and is appropriately anticoagulated and also hypertension.  She struggles with arthritis and colitis.  Right now her arthritis is giving her more difficulties in her meds for colitis were changed and it has resulted in worsening of her arthritis but good control of her colitis.  She does not exercise on a regular basis.  She denies chest discomfort.  She denies orthopnea, paroxysmal nocturnal dyspnea and her peripheral edema is actually better.  She has not had any palpitations, syncope or near syncopal episodes.         Physical Examination Review of Systems   Vitals:    10/05/17 1056   BP: 142/70   Pulse: 64   Resp: 20     Body  mass index is 40.79 kg/(m^2).  Wt Readings from Last 3 Encounters:   10/05/17 (!) 223 lb (101.2 kg)   03/10/17 201 lb (91.2 kg)   10/18/16 204 lb 3.2 oz (92.6 kg)     General Appearance:   Alert, cooperative and in no acute distress.   ENT/Mouth: Oral mucuos membranes pink and moist .      EYES:  No scleral icterus. No Xanthelasma.    Neck: JVP 10 cm.  Positive hepatojugular reflux. Thyroid not visualized   Chest/Lungs:   Lungs have dry crackles at the bases, equal chest wall expansion    Cardiovascular:   S1, S2 with 2/6 systolic murmur , no clicks or rubs. Brachial, radial  pulses are intact and symetric. No carotid bruits noted   Abdomen:  Nontender. BS+.       Extremities: No cyanosis, clubbing and minimal edema   Skin: no xanthelasma, warm.    Psych: Appropriate affect.   Neurologic:  Slow and unsteady gait, normal  bilateral, no tremors        General: Weight Gain  Eyes: WNL  Ears/Nose/Throat: WNL  Lungs: Shortness of Breath  Heart: Arm Pain, Shortness of Breath with activity, Irregular Heartbeat, Leg Swelling  Stomach: Constipation  Bladder: Frequent Urination at Night  Muscle/Joints: Joint Pain, Muscle Weakness, Muscle Pain  Skin: WNL  Nervous System: Daytime Sleepiness, Dizziness, Loss of Balance  Mental Health: Depression     Blood: Easy Bruising     Medical History  Surgical History Family History Social History   Past Medical History:   Diagnosis Date   ? Atrial fibrillation    ? Cardiomyopathy    ? CHF (congestive heart failure)    ? Chronic kidney disease     ckd   ? Colitis    ? Diabetes mellitus    ? Gout    ? Hyperlipidemia    ? Hypertension    ? Morbid obesity    ? MALISSA (obstructive sleep apnea)    ? SOB (shortness of breath)    ? Urinary incontinence     Past Surgical History:   Procedure Laterality Date   ? BACK SURGERY     ? CARDIAC CATHETERIZATION  03/11/2016   ? [place] APPENDECTOMY      Description: Appendectomy;  Recorded: 06/26/2013;   ? [place] ARTHROPLASTY TIBIAL PLATEAU      Description: Knee  Replacement;  Recorded: 06/26/2013; 3 surgery   ? SIGMOIDOSCOPY N/A 10/18/2016    Procedure: SIGMOIDOSCOPY with biopsy;  Surgeon: Angeles Bianchi MD;  Location: [facility];  Service:     Family History   Problem Relation Age of Onset   ? No Medical Problems Mother    ? No Medical Problems Father    ? Breast cancer Sister    ? Colon cancer Sister    ? No Medical Problems Daughter    ? No Medical Problems Maternal Grandmother    ? No Medical Problems Maternal Grandfather    ? No Medical Problems Paternal Grandmother    ? No Medical Problems Paternal Grandfather    ? No Medical Problems Maternal Aunt    ? No Medical Problems Paternal Aunt    ? BRCA 1/2 Neg Hx    ? Cancer Neg Hx    ? Endometrial cancer Neg Hx    ? Ovarian cancer Neg Hx     Social History     Social History   ? Marital status:      Spouse name: N/A   ? Number of children: N/A   ? Years of education: N/A     Occupational History   ? Not on file.     Social History Main Topics   ? Smoking status: Never Smoker   ? Smokeless tobacco: Not on file   ? Alcohol use No   ? Drug use: No   ? Sexual activity: Not on file     Other Topics Concern   ? Not on file     Social History Narrative          Medications  Allergies   Current Outpatient Prescriptions   Medication Sig Dispense Refill   ? acetaminophen (ACETAMINOPHEN EXTRA STRENGTH) 500 MG tablet Take 1,000 mg by mouth 2 (two) times a day as needed.      ? allopurinol (ZYLOPRIM) 100 MG tablet Take 200 mg by mouth daily.     ? aspirin 81 mg chewable tablet Chew 81 mg daily.     ? blood sugar diagnostic (GLUCOSE BLOOD) Strp Use As Directed. OneTouch Ultra Blue In Vitro Strip.     ? CALCIUM CARBONATE (CALCIUM 500 ORAL) Take 1 tablet by mouth daily. Tablet.     ? cholecalciferol, vitamin D3, (VITAMIN D3) 1,000 unit capsule Take 1,000 Units by mouth daily.      ? furosemide (LASIX) 40 MG tablet Take 80 mg by mouth daily.     ? glimepiride (AMARYL) 4 MG tablet Take 4 mg by mouth daily before breakfast.     ?  "glucosamine sulfate 500 mg cap Take 500 mg by mouth daily.     ? MULTIVITAMIN ORAL Take 1 tablet by mouth daily. TABS.     ? polyethylene glycol (MIRALAX) 17 gram packet Take 17 g by mouth daily.     ? potassium chloride 20 mEq TbER Take 20 mEq by mouth 2 (two) times a day.      ? predniSONE (DELTASONE) 20 MG tablet Take 20 mg by mouth daily.     ? psyllium seed, sugar, (METAMUCIL) Powd Take by mouth daily. Takes a \"heaping spoonful\" once daily     ? simvastatin (ZOCOR) 20 MG tablet Take 20 mg by mouth bedtime.      ? vedolizumab (ENTYVIO) 300 mg SolR injection Infuse 300 mg into a venous catheter every 2 (two) months.     ? warfarin (COUMADIN) 3 MG tablet Take 3 mg by mouth daily. Aneudy 4.5mg 3 d and 3mg 4 days orally once a day     ? glimepiride (AMARYL) 1 MG tablet Take 2 mg by mouth daily.      ? mesalamine (LIALDA) 1.2 gram EC tablet Take 4,800 mg by mouth daily. Takes 4 tablets daily     ? spironolactone (ALDACTONE) 25 MG tablet        No current facility-administered medications for this visit.       No Known Allergies      Lab Results    Chemistry/lipid CBC Cardiac Enzymes/BNP/TSH/INR   Lab Results   Component Value Date    CHOL 140 06/12/2017    HDL 48 (L) 06/12/2017    LDLCALC 47 06/12/2017    TRIG 224 (H) 06/12/2017    CREATININE 1.09 06/12/2017    BUN 35 (H) 06/12/2017    K 3.9 06/12/2017     06/12/2017     06/12/2017    CO2 24 06/12/2017    Lab Results   Component Value Date    WBC 6.9 10/19/2016    HGB 9.1 (L) 10/19/2016    HCT 27.5 (L) 10/19/2016     (H) 10/19/2016     10/19/2016    Lab Results   Component Value Date    CKMB 4 08/20/2012    TROPONINI 0.02 08/20/2012     07/08/2014    TSH 2.93 10/22/2015    INR 1.29 (H) 10/19/2016                                                                   "

## 2021-06-25 NOTE — PROGRESS NOTES
Progress Notes by Rony Verma MD at 3/10/2017  1:30 PM     Author: Rony Verma MD Service: -- Author Type: Physician    Filed: 3/10/2017  2:23 PM Encounter Date: 3/10/2017 Status: Signed    : Rony Verma MD (Physician)           Click to link to [facility] HEART CARE NOTE    Thank you, Dr. Gill, for asking us to see Ani Castillo at the [facility].      Assessment/Recommendations   She with known diastolic heart failure who has worsening edema and shortness of breath which may be in part related to sodium indiscretion.  I do not think however that she has changed her diet a great deal.  She could have worsening filling pressures at this time and I am also concerned that she could have either a slower fast ventricular response in permanent atrial fibrillation.    I have asked her to cut the sodium in her diet significantly over the next couple weeks.  I would like her to continue the Spironolactone but call us next week to see how she is doing on Tuesday if she is not better I would consider increasing her diuretics.    We will obtain a Holter monitor to evaluate the ventricular response to atrial fibrillation as well.    Thank you for allowing us to participate in her care.         History of Present Illness    Ms. Ani Castillo is a 83 y.o. female with known diastolic filling abnormalities with previously documented elevated left ventricular end-diastolic pressure and fluid retention.  She does not have significant coronary artery disease but she has permanent atrial fibrillation which complicates her symptoms of diastolic heart failure.  In the last 2-3 weeks she has had worsening peripheral edema and she thinks over the last year and certainly over the last few months her dyspnea on exertion is worse.  She does use a CPAP mask.  She has not had any chest discomfort and denies paroxysmal nocturnal dyspnea.  She uses 3 pillows but  mostly for comfort and not necessarily for breathing as she uses a CPAP mask.  Her peripheral edema is significantly worse.  She was started on spironolactone in addition to 80 mg daily of furosemide.  Spironolactone was started earlier this week.  She has lost maybe a pound.  She does admit to eating some salty foods, liking Chinese takeout and barbecued ribs both of which she has had this week.         Physical Examination Review of Systems   Vitals:    03/10/17 1346   BP: 112/62   Pulse: 68   Resp: 24     Body mass index is 36.76 kg/(m^2).  Wt Readings from Last 3 Encounters:   03/10/17 201 lb (91.2 kg)   10/18/16 204 lb 3.2 oz (92.6 kg)   05/13/16 217 lb (98.4 kg)     General Appearance:   Alert, cooperative and in no acute distress.   ENT/Mouth: Oral mucuos membranes pink and moist .      EYES:  No scleral icterus. No Xanthelasma.    Neck: JVP 12-13 cm.  Positive hepatojugular reflux. Thyroid not visualized   Chest/Lungs:   Lungs are clear to auscultation, equal chest wall expansion.  Slight dry crackles at the bases    Cardiovascular:   S1, S2 without murmur ,clicks or rubs. Brachial, radial and posterior tibial pulses are intact and symetric. No carotid bruits noted   Abdomen:  Nontender. BS+.  Rounded       Extremities: No cyanosis, clubbing and bilateral pitting pretibial edema, as on the left    Skin: no xanthelasma, warm.    Psych: Appropriate affect.   Neurologic:  slow and slightly unsteady gait, normal  bilateral, no tremors        General: Weight Gain, Weight Loss  Eyes: Visual Distubance  Ears/Nose/Throat: WNL  Lungs: Shortness of Breath  Heart: Shortness of Breath with activity, Irregular Heartbeat, Leg Swelling  Stomach: Constipation  Bladder: Frequent Urination at Night  Muscle/Joints: WNL  Skin: WNL  Nervous System: Daytime Sleepiness, Loss of Balance  Mental Health: Depression     Blood: WNL     Medical History  Surgical History Family History Social History   Past Medical History:    Diagnosis Date   ? Atrial fibrillation    ? Cardiomyopathy    ? CHF (congestive heart failure)    ? Chronic kidney disease     ckd   ? Colitis    ? Diabetes mellitus    ? Gout    ? Hyperlipidemia    ? Hypertension    ? Morbid obesity    ? MALISSA (obstructive sleep apnea)    ? SOB (shortness of breath)    ? Urinary incontinence     Past Surgical History:   Procedure Laterality Date   ? BACK SURGERY     ? CARDIAC CATHETERIZATION  03/11/2016   ? [place] APPENDECTOMY      Description: Appendectomy;  Recorded: 06/26/2013;   ? [place] ARTHROPLASTY TIBIAL PLATEAU      Description: Knee Replacement;  Recorded: 06/26/2013; 3 surgery   ? SIGMOIDOSCOPY N/A 10/18/2016    Procedure: SIGMOIDOSCOPY with biopsy;  Surgeon: Angeles Bianchi MD;  Location: [facility];  Service:     Family History   Problem Relation Age of Onset   ? No Medical Problems Mother    ? No Medical Problems Father    ? Breast cancer Sister    ? Colon cancer Sister    ? No Medical Problems Daughter    ? No Medical Problems Maternal Grandmother    ? No Medical Problems Maternal Grandfather    ? No Medical Problems Paternal Grandmother    ? No Medical Problems Paternal Grandfather    ? No Medical Problems Maternal Aunt    ? No Medical Problems Paternal Aunt    ? BRCA 1/2 Neg Hx    ? Cancer Neg Hx    ? Endometrial cancer Neg Hx    ? Ovarian cancer Neg Hx     Social History     Social History   ? Marital status:      Spouse name: N/A   ? Number of children: N/A   ? Years of education: N/A     Occupational History   ? Not on file.     Social History Main Topics   ? Smoking status: Never Smoker   ? Smokeless tobacco: Not on file   ? Alcohol use No   ? Drug use: No   ? Sexual activity: Not on file     Other Topics Concern   ? Not on file     Social History Narrative          Medications  Allergies   Current Outpatient Prescriptions   Medication Sig Dispense Refill   ? acetaminophen (ACETAMINOPHEN EXTRA STRENGTH) 500 MG tablet Take 1,000 mg by mouth 2 (two) times a day  "as needed.      ? allopurinol (ZYLOPRIM) 100 MG tablet Take 200 mg by mouth daily.     ? blood sugar diagnostic (GLUCOSE BLOOD) Strp Use As Directed. OneTouch Ultra Blue In Vitro Strip.     ? CALCIUM CARBONATE (CALCIUM 500 ORAL) Take 1 tablet by mouth daily. Tablet.     ? cholecalciferol, vitamin D3, (VITAMIN D3) 1,000 unit capsule Take 1,000 Units by mouth daily.      ? furosemide (LASIX) 40 MG tablet Take 80 mg by mouth daily.     ? glimepiride (AMARYL) 1 MG tablet Take 2 mg by mouth daily.      ? glucosamine sulfate 500 mg cap Take 500 mg by mouth daily.     ? mesalamine (LIALDA) 1.2 gram EC tablet Take 4,800 mg by mouth daily. Takes 4 tablets daily     ? polyethylene glycol (MIRALAX) 17 gram packet Take 17 g by mouth daily.     ? potassium chloride 20 mEq TbER Take 40 mEq by mouth daily.      ? simvastatin (ZOCOR) 20 MG tablet Take 20 mg by mouth bedtime.      ? spironolactone (ALDACTONE) 25 MG tablet      ? warfarin (COUMADIN) 3 MG tablet Take 3 mg by mouth daily. Aneudy 4.5mg 3 d and 3mg 4 days orally once a day     ? aspirin 81 mg chewable tablet Chew 81 mg daily.     ? MULTIVITAMIN ORAL Take 1 tablet by mouth daily. TABS.     ? psyllium seed, sugar, (METAMUCIL) Powd Take by mouth daily. Takes a \"heaping spoonful\" once daily       No current facility-administered medications for this visit.       No Known Allergies      Lab Results    Chemistry/lipid CBC Cardiac Enzymes/BNP/TSH/INR   Lab Results   Component Value Date    CHOL 128 06/20/2016    HDL 41 (L) 06/20/2016    LDLCALC 37 06/20/2016    TRIG 248 (H) 06/20/2016    CREATININE 1.15 (H) 03/02/2017    BUN 24 03/02/2017    K 3.9 03/02/2017     03/02/2017     03/02/2017    CO2 25 03/02/2017    Lab Results   Component Value Date    WBC 6.9 10/19/2016    HGB 9.1 (L) 10/19/2016    HCT 27.5 (L) 10/19/2016     (H) 10/19/2016     10/19/2016    Lab Results   Component Value Date    CKMB 4 08/20/2012    TROPONINI 0.02 08/20/2012     " 07/08/2014    TSH 2.93 10/22/2015    INR 1.29 (H) 10/19/2016

## 2021-06-26 NOTE — PROGRESS NOTES
Progress Notes by Dominic Fernandez RN, WOC at 11/8/2018 10:15 AM     Author: Dominic Fernandez RN, WOC Service: -- Author Type: Registered Nurse    Filed: 11/8/2018 11:32 AM Encounter Date: 11/8/2018 Status: Signed    : Dominic Fernandez RN, WOC (Registered Nurse)       OUTPATIENT OSTOMY ASSESSMENT    Patients mode of arrival: Ambulatory assisted    INTAKE  Type of Stoma: Permanent Ileostomy  Anticipated date of takedown: NA    Diagnosis Pertinent to Stoma:Bowel obstruction Date of Diagnosis: 10/5/18  Type of Surgery: Ileostomy    Surgery Date: 10/6/18  Surgeon:[facility]: [facility]    Purpose of this visit:Checkup:2 week and Leaking Problem    Present for Teaching Session: Patient and Family Member   Present: NAMITA    Pertinent Information: Left TCU on Friday, has had 2 leaks since then    Current Equipment:    Product # Brand Description   Pouch [address] CTF   Flange      Paste [address] [employer] ring   Powder      Deodorizer                    Pouch Change Frequency: Every 2-3 days  Provider of Care: Emptying: self Pouch Change: home care     ASSESSMENT  Stoma Size: Round 1 3/8 inches  Protrusion: 3cm  Vascularity: Pink, small 1x0.3cm area of bleeding hypergranulation tissue  Mucocutaneous Juncture: Intact  Peristomal Skin: Abnormal 2cm strip of weeping erythema at 4-7 o'clock where tape border was, appears likely due to skin stripping    Wound: No  Current Wound Care: NA    TREATMENT  Silver Nitrate to : hypergranulation # of Sticks used: 1 and Applied: stoma powder and No Sting to weeping erythema    INTERVENTIONS  Refitting done, Educated on peristomal skin treatment and Educated on pouch change procedure  Miscellaneous Interventions: Signed up for [employer] [facility] or [employer]    INSTRUCTIONS GIVEN  WOC Role, Anatomy, Fluids: Drink 6-8 glasses of fluids daily , Pouching Products: Showed pouching system , Insurance, Ordering supplies and Support  Group    PLAN  Change in Supplies: See Outpatient Ostomy Instructions      Total Time Spent with Patient: 40 minutes.  Education time: 25 minutes.

## 2021-06-26 NOTE — PROGRESS NOTES
Progress Notes by Rony Verma MD at 10/18/2018  3:00 PM     Author: Rony Verma MD Service: -- Author Type: Physician    Filed: 10/18/2018  3:55 PM Encounter Date: 10/18/2018 Status: Signed    : Rony Verma MD (Physician)           Click to link to [facility] HEART CARE NOTE    Thank you, Dr. Gill, for asking us to see Ani Castillo at the [facility] [place] [facility].      Assessment/Recommendations   Patient with known permanent atrial fibrillation with GI bleeding and is now on a baby aspirin which is appropriate as she recovers and continues on her steroid taper for her Crohn's disease.  I would like to see her back in a couple of months and we can talk about the possibility of re-adding anticoagulants or the watchman device.    I have also stopped her metoprolol as I think her heart rate is understandably high when she had high catecholamine states in the hospital and this will be improved and I do not think she will need AV angeli blocking agents going forward.    Thanks for allowing us to participate in her care.         History of Present Illness    Ms. Ani Castillo is a 85 y.o. female with known permanent atrial fibrillation.  She has had coronary angiography and does not have significant coronary artery disease.  She was recently hospitalized for GI bleeding and she has a history of Crohn's and ultimately underwent a partial colon colectomy with colostomy.  Her A. fib became more rapid in the hospital with increasing blood pressure and she is placed on a beta-blocker.  Interestingly, prior to that she had good rate control of her atrial fibrillation with out any AV angeli blocking agents and this is been documented by Holter monitor.    She is fatigued and she is recovering from the surgery and she is in a TCU.  She is currently on a beta-blocker and her blood pressure tends to run low and she is very tired and fatigued and she wonders if it is  partially related the beta-blocker but also knows that the surgery is involved as well.             Physical Examination Review of Systems   Vitals:    10/18/18 1517   BP: 90/56   Pulse: 72   Resp: 20     There is no height or weight on file to calculate BMI.  Wt Readings from Last 3 Encounters:   10/11/18 191 lb 3.2 oz (86.7 kg)   09/09/18 197 lb (89.4 kg)   08/29/18 205 lb (93 kg)     General Appearance:   Alert, cooperative and in no acute distress.   ENT/Mouth: Oral mucuos membranes pink and moist .      EYES:  No scleral icterus. No Xanthelasma.    Neck: JVP normal. No Hepatojugular reflux. Thyroid not visualized   Chest/Lungs:   Lungs are clear to auscultation, equal chest wall expansion    Cardiovascular:   S1, S2 without murmur ,clicks or rubs. Brachial, radial  pulses are intact and symetric. No carotid bruits noted   Abdomen:        Extremities: No cyanosis, clubbing or edema   Skin: no xanthelasma, warm.    Psych: Appropriate affect.   Neurologic:  normal  bilateral, no tremors        General: Weight Loss  Eyes: WNL  Ears/Nose/Throat: WNL  Lungs: Shortness of Breath, Snoring  Heart: Shortness of Breath with activity, Irregular Heartbeat, Leg Swelling  Stomach: Abdominal Pain, Constipation, Diarrhea  Bladder: Frequent Urination at Night  Muscle/Joints: Muscle Weakness  Skin: Poor Wound Healing  Nervous System: Daytime Sleepiness, Dizziness, Loss of Balance  Mental Health: Depression, Anxiety     Blood: Easy Bleeding     Medical History  Surgical History Family History Social History   Past Medical History:   Diagnosis Date   ? Atrial fibrillation (H)    ? Benign essential hypertension     Created by Conversion    ? Cardiomyopathy (H)    ? CHF (congestive heart failure) (H)    ? Chronic kidney disease     ckd   ? Chronic Kidney Disease (NKF Classification)     Created by Conversion    ? Colitis    ? Diabetes mellitus (H)    ? Diastolic Congestive Heart Failure     Created by Conversion  Replacement  Utility updated for latest IMO load   ? DM (diabetes mellitus) (H)     Created by Conversion    ? GI bleed 10/18/2016   ? Gout    ? Hyperlipidemia    ? Hyperlipidemia     Created by Conversion    ? Hypertension    ? Morbid obesity (H)    ? MALISSA (obstructive sleep apnea)    ? Persistent atrial fibrillation (H)      [id number] score of 6 and on chronic warfarin Cardioversion 11/10,  7/11asymptomatic and on rate control     ? PRB (rectal bleeding)    ? Shortness of breath     Created by Conversion    ? Sleep apnea     uses a CPAP   ? SOB (shortness of breath)    ? Urinary incontinence     Past Surgical History:   Procedure Laterality Date   ? BACK SURGERY     ? CARDIAC CATHETERIZATION  03/11/2016   ? ILEOSTOMY N/A 10/6/2018    Procedure: CREATION, ILEOSTOMY, OPEN;  Surgeon: Buzz Arvizu MD;  Location: [facility] [place];  Service:    ? NM APPENDECTOMY      Description: Appendectomy;  Recorded: 06/26/2013;   ? NM ARTHROPLASTY TIBIAL PLATEAU      Description: Knee Replacement;  Recorded: 06/26/2013; 3 surgery   ? NM PART REMOVAL COLON W ANASTOMOSIS N/A 10/6/2018    Procedure: TOTAL ABDOMINAL COLECTOMY, OMENTECTOMY;  Surgeon: Buzz Arvizu MD;  Location: [facility] [place];  Service: General   ? NM SIGMOIDOSCOPY FLX DX W/COLLJ SPEC BR/WA IF PFRMD N/A 5/29/2018    Procedure: SIGMOIDOSCOPY with biopsy;  Surgeon: Simone Conner MD;  Location: [facility];  Service: Gastroenterology   ? SIGMOIDOSCOPY N/A 10/18/2016    Procedure: SIGMOIDOSCOPY with biopsy;  Surgeon: Angeles Bianchi MD;  Location: [facility];  Service:     Family History   Problem Relation Age of Onset   ? No Medical Problems Mother    ? No Medical Problems Father    ? Breast cancer Sister    ? Colon cancer Sister    ? No Medical Problems Daughter    ? No Medical Problems Maternal Grandmother    ? No Medical Problems Maternal Grandfather    ? No Medical Problems Paternal Grandmother    ? No Medical Problems Paternal Grandfather    ? No  Medical Problems Maternal Aunt    ? No Medical Problems Paternal Aunt    ? BRCA 1/2 Neg Hx    ? Cancer Neg Hx    ? Endometrial cancer Neg Hx    ? Ovarian cancer Neg Hx     Social History     Social History   ? Marital status:      Spouse name: N/A   ? Number of children: N/A   ? Years of education: N/A     Occupational History   ? Not on file.     Social History Main Topics   ? Smoking status: Never Smoker   ? Smokeless tobacco: Never Used   ? Alcohol use No   ? Drug use: No   ? Sexual activity: Not on file     Other Topics Concern   ? Not on file     Social History Narrative    Lives with her .          Medications  Allergies   Current Outpatient Prescriptions   Medication Sig Dispense Refill   ? acetaminophen (ACETAMINOPHEN EXTRA STRENGTH) 500 MG tablet Take 1,000 mg by mouth 2 (two) times a day as needed.      ? aspirin 81 mg chewable tablet Chew 81 mg daily.     ? blood sugar diagnostic (GLUCOSE BLOOD) Strp Use As Directed. OneTouch Ultra Blue In Vitro Strip.     ? escitalopram oxalate (LEXAPRO) 10 MG tablet Take 10 mg by mouth daily.     ? ferrous sulfate 325 (65 FE) MG tablet Take 1 tablet by mouth daily with breakfast.     ? insulin lispro (HUMALOG) 100 unit/mL injection Inject 2-10 Units under the skin 3 (three) times a day before meals. Sliding scale (from [facility] clinic records)  150-200 2 units  200-250 4 units  Up to 10 units if > 350     ? insulin NPH (NOVOLIN) 100 unit/mL injection Inject 16 Units under the skin 2 (two) times a day.     ? Lactobacillus rhamnosus GG (CULTURELLE) 10-15 Billion cell capsule Take 1 capsule by mouth daily.     ? metoprolol tartrate (LOPRESSOR) 25 MG tablet Take 1 tablet (25 mg total) by mouth 2 (two) times a day. (Patient taking differently: Take 12.5 mg by mouth 2 (two) times a day. ) 60 tablet 0   ? omeprazole (PRILOSEC) 20 MG capsule Take 1 capsule (20 mg total) by mouth daily before breakfast. 30 capsule 0   ? predniSONE (DELTASONE) 10 mg tablet 2 tabs  daily for 3 days and 1 tab daily for 10 days. 16 tablet 0   ? simvastatin (ZOCOR) 20 MG tablet Take 20 mg by mouth bedtime.        No current facility-administered medications for this visit.       Allergies   Allergen Reactions   ? Metformin Other (See Comments)     GI symptoms         Lab Results    Chemistry/lipid CBC Cardiac Enzymes/BNP/TSH/INR   Lab Results   Component Value Date    CHOL 140 06/12/2017    HDL 48 (L) 06/12/2017    LDLCALC 47 06/12/2017    TRIG 224 (H) 06/12/2017    CREATININE 1.04 10/18/2018    BUN 16 10/18/2018    K 3.6 10/18/2018     10/18/2018     (H) 10/18/2018    CO2 27 10/18/2018    Lab Results   Component Value Date    WBC 8.7 10/18/2018    HGB 9.5 (L) 10/18/2018    HCT 30.6 (L) 10/18/2018    MCV 96 10/18/2018     10/18/2018    Lab Results   Component Value Date    CKMB 4 08/20/2012    TROPONINI 0.02 08/20/2012     10/05/2017    TSH 2.56 08/03/2018    INR 1.10 10/05/2018

## 2021-06-26 NOTE — PROGRESS NOTES
Progress Notes by Rony Verma MD at 4/11/2018 11:10 AM     Author: Rony Verma MD Service: -- Author Type: Physician    Filed: 4/11/2018 12:02 PM Encounter Date: 4/11/2018 Status: Signed    : Rony Verma MD (Physician)           Click to link to [facility] HEART CARE NOTE    Thank you, Dr. Gill, for asking us to see Ani Castillo at the [facility].      Assessment/Recommendations   Patient with known diastolic heart failure, permanent atrial fibrillation, hypertension, and mild coronary artery disease.  Her functional capacity stable although limited.  I have encouraged her to maintain an active lifestyle she has been quite sedentary over the winter.  She will try to get in a walk or go to the [facility] on a regular basis although she said that in the past as well.    She has had a distant history of a pulmonary evaluation but this is been sometime.  We talked about the possibility of pulmonary function test but I certainly do not hear any expiratory wheezing.  She does have some positive hepatojugular reflux.    We talked today about the possibility of a watchman device that would allow her to come off of anticoagulation.  I described the device and the testing and the procedure to her today and she will give that some thought and we will ask our coordinator to call her.  We will also give her a pamphlet of information.    I am not changing of her medicines today.  We will plan on seeing her in 6 months.    Thank you for allowing us to participate in her care.         History of Present Illness    Ms. Ani Castillo is a 85 y.o. female with known diastolic heart failure, hypertension, permanent atrial fibrillation and mild coronary artery disease.  She has been stable from a functional capacity standpoint.  She has not had any change in her shortness of breath with activity but she continues to be short of breath with activity.  She has not  had any chest discomfort, syncope, near syncope or palpitations.  She takes her furosemide on an as-needed basis when her left leg swells a bit more.  This occurs about once a week.  She did take a fall after tripping and cut up her skin significantly but did not hit her head.  She is also had a history of some GI bleeding from colitis.  She is on a monthly infusing now and she is hoping that this will help.         Physical Examination Review of Systems   Vitals:    04/11/18 1112   BP: 114/72   Pulse: 76   Resp: 20     Body mass index is 40.97 kg/(m^2).  Wt Readings from Last 3 Encounters:   04/11/18 (!) 224 lb (101.6 kg)   03/26/18 219 lb 1.6 oz (99.4 kg)   12/08/17 219 lb (99.3 kg)     General Appearance:   Alert, cooperative and in no acute distress.   ENT/Mouth: Oral mucuos membranes pink and moist .      EYES:  No scleral icterus. No Xanthelasma.    Neck: JVP normal.  Positive hepatojugular reflux. Thyroid not visualized   Chest/Lungs:   Lungs are clear to auscultation, equal chest wall expansion    Cardiovascular:   S1, S2 without murmur ,clicks or rubs. Brachial, radial  pulses are intact and symetric. No carotid bruits noted   Abdomen:  Nontender. BS+. No bruits.      Extremities: No cyanosis, clubbing and mild bilateral pretibial edema   Skin: no xanthelasma, warm.    Psych: Appropriate affect.   Neurologic:  Very slow gait with instability, normal  bilateral, no tremors        General: WNL  Eyes: Visual Distubance  Ears/Nose/Throat: WNL  Lungs: Cough, Shortness of Breath  Heart: Shortness of Breath with activity, Irregular Heartbeat, Leg Swelling  Stomach: WNL  Bladder: Frequent Urination at Night  Muscle/Joints: WNL  Skin: WNL  Nervous System: WNL  Mental Health: WNL     Blood: Easy Bleeding, Easy Bruising     Medical History  Surgical History Family History Social History   Past Medical History:   Diagnosis Date   ? Atrial fibrillation    ? Benign essential hypertension     Created by Conversion     ? Cardiomyopathy    ? CHF (congestive heart failure)    ? Chronic kidney disease     ckd   ? Chronic Kidney Disease (NKF Classification)     Created by Conversion    ? Colitis    ? Diabetes mellitus    ? Diastolic Congestive Heart Failure     Created by Conversion  Replacement Utility updated for latest IMO load   ? DM (diabetes mellitus)     Created by Conversion    ? GI bleed 10/18/2016   ? Gout    ? Hyperlipidemia    ? Hyperlipidemia     Created by Conversion    ? Hypertension    ? Morbid obesity    ? MALISSA (obstructive sleep apnea)    ? Persistent atrial fibrillation      [id number] score of 6 and on chronic warfarin Cardioversion 11/10,  7/11asymptomatic and on rate control     ? PRB (rectal bleeding)    ? Shortness of breath     Created by Conversion    ? Sleep apnea     uses a CPAP   ? SOB (shortness of breath)    ? Urinary incontinence     Past Surgical History:   Procedure Laterality Date   ? BACK SURGERY     ? CARDIAC CATHETERIZATION  03/11/2016   ? NC APPENDECTOMY      Description: Appendectomy;  Recorded: 06/26/2013;   ? NC ARTHROPLASTY TIBIAL PLATEAU      Description: Knee Replacement;  Recorded: 06/26/2013; 3 surgery   ? SIGMOIDOSCOPY N/A 10/18/2016    Procedure: SIGMOIDOSCOPY with biopsy;  Surgeon: Angeles Bianchi MD;  Location: [facility];  Service:     Family History   Problem Relation Age of Onset   ? No Medical Problems Mother    ? No Medical Problems Father    ? Breast cancer Sister    ? Colon cancer Sister    ? No Medical Problems Daughter    ? No Medical Problems Maternal Grandmother    ? No Medical Problems Maternal Grandfather    ? No Medical Problems Paternal Grandmother    ? No Medical Problems Paternal Grandfather    ? No Medical Problems Maternal Aunt    ? No Medical Problems Paternal Aunt    ? BRCA 1/2 Neg Hx    ? Cancer Neg Hx    ? Endometrial cancer Neg Hx    ? Ovarian cancer Neg Hx     Social History     Social History   ? Marital status:      Spouse name: N/A   ? Number  of children: N/A   ? Years of education: N/A     Occupational History   ? Not on file.     Social History Main Topics   ? Smoking status: Never Smoker   ? Smokeless tobacco: Never Used   ? Alcohol use No   ? Drug use: No   ? Sexual activity: Not on file     Other Topics Concern   ? Not on file     Social History Narrative    Lives with her .          Medications  Allergies   Current Outpatient Prescriptions   Medication Sig Dispense Refill   ? acetaminophen (ACETAMINOPHEN EXTRA STRENGTH) 500 MG tablet Take 1,000 mg by mouth 2 (two) times a day as needed.      ? allopurinol (ZYLOPRIM) 100 MG tablet Take 200 mg by mouth daily.     ? aspirin 81 mg chewable tablet Chew 81 mg daily.     ? blood sugar diagnostic (GLUCOSE BLOOD) Strp Use As Directed. OneTouch Ultra Blue In Vitro Strip.     ? CALCIUM CARBONATE (CALCIUM 500 ORAL) Take 1 tablet by mouth daily. Tablet.     ? cholecalciferol, vitamin D3, (VITAMIN D3) 1,000 unit capsule Take 1,000 Units by mouth daily.      ? escitalopram oxalate (LEXAPRO) 5 MG tablet Take 5 mg by mouth daily.     ? furosemide (LASIX) 40 MG tablet Take 40 mg by mouth daily.      ? glimepiride (AMARYL) 4 MG tablet Take 4 mg by mouth daily before breakfast.     ? glucosamine sulfate 500 mg cap Take 500 mg by mouth daily.     ? MULTIVITAMIN ORAL Take 1 tablet by mouth daily. TABS.     ? potassium chloride 20 mEq TbER Take 20 mEq by mouth 2 (two) times a day.      ? predniSONE (DELTASONE) 5 MG tablet 17-1/2 mg or 1 week reduce by 2.5 mg every week to 15, 12.5, 10 7-1/2 and 5 mg over the next 6 weeks.  Continue 5 mg still seen here. 120 tablet 1   ? simvastatin (ZOCOR) 20 MG tablet Take 20 mg by mouth bedtime.      ? vedolizumab (ENTYVIO) 300 mg SolR injection Infuse 300 mg into a venous catheter every 2 (two) months.     ? warfarin (COUMADIN) 3 MG tablet Take 3 mg by mouth daily. Aneudy 4.5mg 3 d and 3mg 4 days orally once a day     ? polyethylene glycol (MIRALAX) 17 gram packet Take 17 g by  "mouth daily.     ? psyllium seed, sugar, (METAMUCIL) Powd Take by mouth daily. Takes a \"heaping spoonful\" once daily       No current facility-administered medications for this visit.       No Known Allergies      Lab Results    Chemistry/lipid CBC Cardiac Enzymes/BNP/TSH/INR   Lab Results   Component Value Date    CHOL 140 06/12/2017    HDL 48 (L) 06/12/2017    LDLCALC 47 06/12/2017    TRIG 224 (H) 06/12/2017    CREATININE 1.00 10/05/2017    BUN 35 (H) 10/05/2017    K 4.4 10/05/2017     10/05/2017     10/05/2017    CO2 23 10/05/2017    Lab Results   Component Value Date    WBC 9.4 10/10/2017    HGB 12.4 10/10/2017    HCT 37.1 10/10/2017     10/10/2017     10/10/2017    Lab Results   Component Value Date    CKMB 4 08/20/2012    TROPONINI 0.02 08/20/2012     10/05/2017    TSH 2.93 10/22/2015    INR 1.29 (H) 10/19/2016                                                                   "

## 2021-06-27 NOTE — PROGRESS NOTES
Progress Notes by Maura Kaiser NP at 5/7/2019 10:30 AM     Author: Maura Kaiser NP Service: -- Author Type: Nurse Practitioner    Filed: 5/7/2019 11:49 AM Encounter Date: 5/7/2019 Status: Signed    : Maura Kaiser NP (Nurse Practitioner)           Click to link to [facility] Heart Care     [facility] HEART CARE NOTE      Assessment/Recommendations   Assessment:    1. Chronic  diastolic dysfunction, NYHA class III: Well compensated with no acute signs and symptoms of heart failure except that she always has some shortness of breath and fatigue at baseline.  She reports feeling improvement in her lower extremity edema since started on Spironolactone recently.  She reports her home weight around 222 pounds this morning.    2. Essential hypertension: Blood pressure today is 120/84-well-controlled.  She is on Spironolactone.    3.  Chronic kidney disease stage III: Most recent sodium level 142, potassium 4.0, BUN 35, and creatinine 1.02 on 3/29/2019.  History of hypomagnesemia.  Most recent magnesium level WNL in March 2019.  She is on magnesium supplement.    4.  Chronic atrial fibrillation: Heart rate controlled in 60s.  She is asymptomatic.  She is not on anticoagulant therapy due to history of significant GI bleed.  Dr. Verma has discussed about possible watchman device placement but patient has declined due to not willing to reinitiate anticoagulation therapy    Plan:  1.  Patient and her daughter declined heart failure education due to noncompliance.  History of significant electrolyte imbalance on furosemide in the past resulted in renal failure and hospitalization.     Heart failure medications:  - Diuretic therapy with spironolactone 12.5 mg daily    2.  Will obtain BMP level today and will consider making further adjustment if necessary.    3.  If her weight has achieved to baseline and renal function stable, will likely continue on current dose of spironolactone.    Patient and her daughter  prefers to have her follow-up in the heart failure clinic as needed for now     History of Present Illness    Ms. Ani Castillo is a 86 y.o. female with a significant past medical history of hypertension, mild coronary artery disease, permanent atrial fibrillation, diabetes type 2, ulcerative colitis with rectal bleeding with significant GI bleed who is seen at [facility] heart failure clinic today for follow-up per Dr. Verma.  Patient was seen by Dr. Verma recently.  She was noted to have worsening heart failure symptoms with at least 5 to 10 pounds weight gain.  She was initiated on a low-dose of Spironolactone and was recommended to follow-up in heart failure clinic with a BMP checked.    Today, patient presented to the heart failure clinic accompanied by her daughter, Lisa.  Magda reports feeling improvement in her lower extremity edema.  Her shortness of breath and fatigue has unchanged from the baseline.    She denies fatigue, lightheadedness, shortness of breath, dyspnea on exertion, orthopnea, PND, palpitations, chest pain, abdominal fullness/bloating and lower extremity edema.  She had some pain in the left lateral neck region which resolved.  She thought the pain was due to possible blockage in her carotid artery.  She just started using the walker recently.  She has been sedentary and inactive during the winter.  She just started to walk and able to walk about 1 block at a time with taking breaks in between.    ECHO-Reviewed:   Results for orders placed during the hospital encounter of 12/01/18   Echo Complete [ECH10] 12/02/2018    Narrative   Left atrial volume is mildly increased.    Normal right ventricular size and systolic function.    No hemodynamically significant valvular heart abnormalities.    When compared to the previous study dated 10/18/2016, no significant   change.        Physical Examination Review of Systems   Vitals:    05/07/19 1032   BP: 120/82   Pulse: 65   Resp:  24     Body mass index is 41.34 kg/m .  Wt Readings from Last 3 Encounters:   05/07/19 (!) 226 lb (102.5 kg)   04/24/19 (!) 227 lb (103 kg)   03/13/19 193 lb (87.5 kg)       General Appearance:     Alert, cooperative and in no acute distress.   ENT/Mouth: membranes moist, no oral lesions or bleeding gums.      EYES:  no scleral icterus, normal conjunctivae   Neck: no carotid bruits or thyromegaly   Chest/Lungs:   lungs are clear to auscultation, no rales or wheezing, respirations unlabored   Cardiovascular:    Irregularly irregular. Normal first and second heart sounds with no murmurs, rubs, or gallops; the carotid, radial and posterior tibial pulses are intact, Jugular venous pressure flat with no HJR, mild (Lt>rt) edema bilateral lower extremities    Abdomen:  Large but soft, nontender, nondistended, bowel sounds present   Extremities: no cyanosis or clubbing   Skin: warm, dry.    Neurologic: mood and affect are appropriate, alert and oriented x3    General: WNL  Eyes: WNL  Ears/Nose/Throat: WNL  Lungs: WNL  Heart: Arm Pain, Shortness of Breath with activity, Leg Swelling  Stomach: WNL  Bladder: WNL  Muscle/Joints: Muscle Weakness  Skin: Rash  Nervous System: WNL  Mental Health: WNL     Blood: Easy Bruising     Medical History  Surgical History Family History Social History   Past Medical History:   Diagnosis Date   ? Atrial fibrillation (H)    ? Benign essential hypertension     Created by Conversion    ? Cardiomyopathy (H)    ? CHF (congestive heart failure) (H)    ? Chronic kidney disease     ckd   ? Chronic Kidney Disease (NKF Classification)     Created by Conversion    ? Colitis    ? Diabetes mellitus (H)    ? Diastolic Congestive Heart Failure     Created by Conversion  Replacement Utility updated for latest IMO load   ? DM (diabetes mellitus) (H)     Created by Conversion    ? GI bleed 10/18/2016   ? Gout    ? Hyperlipidemia    ? Hyperlipidemia     Created by Conversion    ? Hypertension    ? Morbid  obesity (H)    ? MALISSA (obstructive sleep apnea)    ? Persistent atrial fibrillation (H)      [id number] score of 6 and on chronic warfarin Cardioversion 11/10,  7/11asymptomatic and on rate control     ? PRB (rectal bleeding)    ? Shortness of breath     Created by Conversion    ? Sleep apnea     uses a CPAP   ? SOB (shortness of breath)    ? Urinary incontinence     Past Surgical History:   Procedure Laterality Date   ? BACK SURGERY     ? CARDIAC CATHETERIZATION  03/11/2016   ? ILEOSTOMY N/A 10/6/2018    Procedure: CREATION, ILEOSTOMY, OPEN;  Surgeon: Buzz Arvizu MD;  Location: [facility];  Service:    ? NJ APPENDECTOMY      Description: Appendectomy;  Recorded: 06/26/2013;   ? NJ ARTHROPLASTY TIBIAL PLATEAU      Description: Knee Replacement;  Recorded: 06/26/2013; 3 surgery   ? NJ PART REMOVAL COLON W ANASTOMOSIS N/A 10/6/2018    Procedure: TOTAL ABDOMINAL COLECTOMY, OMENTECTOMY;  Surgeon: Buzz Arvizu MD;  Location: [facility];  Service: General   ? NJ SIGMOIDOSCOPY FLX DX W/COLLJ SPEC BR/WA IF PFRMD N/A 5/29/2018    Procedure: SIGMOIDOSCOPY with biopsy;  Surgeon: Simone Conner MD;  Location: [facility];  Service: Gastroenterology   ? SIGMOIDOSCOPY N/A 10/18/2016    Procedure: SIGMOIDOSCOPY with biopsy;  Surgeon: Angeles Bianchi MD;  Location: [facility];  Service:     Family History   Problem Relation Age of Onset   ? No Medical Problems Mother    ? No Medical Problems Father    ? Breast cancer Sister    ? Colon cancer Sister    ? No Medical Problems Daughter    ? No Medical Problems Maternal Grandmother    ? No Medical Problems Maternal Grandfather    ? No Medical Problems Paternal Grandmother    ? No Medical Problems Paternal Grandfather    ? No Medical Problems Maternal Aunt    ? No Medical Problems Paternal Aunt    ? BRCA 1/2 Neg Hx    ? Cancer Neg Hx    ? Endometrial cancer Neg Hx    ? Ovarian cancer Neg Hx     Social History     Socioeconomic History   ? Marital  status:      Spouse name: Not on file   ? Number of children: Not on file   ? Years of education: Not on file   ? Highest education level: Not on file   Occupational History   ? Not on file   Social Needs   ? Financial resource strain: Not on file   ? Food insecurity:     Worry: Not on file     Inability: Not on file   ? Transportation needs:     Medical: Not on file     Non-medical: Not on file   Tobacco Use   ? Smoking status: Never Smoker   ? Smokeless tobacco: Never Used   Substance and Sexual Activity   ? Alcohol use: No   ? Drug use: No   ? Sexual activity: Not on file   Lifestyle   ? Physical activity:     Days per week: Not on file     Minutes per session: Not on file   ? Stress: Not on file   Relationships   ? Social connections:     Talks on phone: Not on file     Gets together: Not on file     Attends [employer] service: Not on file     Active member of club or organization: Not on file     Attends meetings of clubs or organizations: Not on file     Relationship status: Not on file   ? Intimate partner violence:     Fear of current or ex partner: Not on file     Emotionally abused: Not on file     Physically abused: Not on file     Forced sexual activity: Not on file   Other Topics Concern   ? Not on file   Social History Narrative    Lives with her .          Medications  Allergies   Current Outpatient Medications   Medication Sig Dispense Refill   ? acetaminophen (ACETAMINOPHEN EXTRA STRENGTH) 500 MG tablet Take 1,000 mg by mouth 2 (two) times a day as needed.      ? aspirin 81 mg chewable tablet Chew 81 mg daily.     ? blood sugar diagnostic (GLUCOSE BLOOD) Strp Use As Directed. OneTouch Ultra Blue In Vitro Strip.     ? escitalopram oxalate (LEXAPRO) 10 MG tablet Take 10 mg by mouth daily.     ? ferrous sulfate 325 (65 FE) MG tablet Take 1 tablet by mouth daily with breakfast.     ? insulin lispro (HUMALOG KWIKPEN) 100 unit/mL pen Inject 4 Units under the skin 3 (three) times a day with  meals Hold dose if you don't eat. 5 adj dose pen prn   ? LANTUS SOLOSTAR U-100 INSULIN 100 unit/mL (3 mL) pen Inject 30 Units under the skin daily with breakfast. (Patient taking differently: Inject 28 Units under the skin daily with breakfast.       ) 5 adj dose pen PRN   ? magnesium oxide (MAG-OX) 400 mg (241.3 mg magnesium) tablet Take 400 mg by mouth daily.     ? predniSONE (DELTASONE) 2.5 MG tablet 7.5 mg/d prednisone PO for 1 month, reduce to 5 mg/d for the next month, and then reduce to 2.5 mg/d for the third month and then stop.. (Patient taking differently: 7.5 mg/d prednisone PO Daily.) 90 tablet 2   ? simvastatin (ZOCOR) 20 MG tablet Take 20 mg by mouth every morning .           ? spironolactone (ALDACTONE) 25 MG tablet Take 0.5 tablets (12.5 mg total) by mouth daily. 30 tablet 5     No current facility-administered medications for this visit.       Allergies   Allergen Reactions   ? Metformin Other (See Comments)     GI symptoms         Lab Results    Chemistry CBC BNP   Lab Results   Component Value Date    CREATININE 1.02 03/29/2019    BUN 35 (H) 03/29/2019     03/29/2019    K 4.0 03/29/2019     (H) 03/29/2019    CO2 20 (L) 03/29/2019     Creatinine (mg/dL)   Date Value   03/29/2019 1.02   01/11/2019 1.30 (H)   12/13/2018 1.48 (H)   12/06/2018 1.36 (H)    Lab Results   Component Value Date    WBC 9.2 12/06/2018    HGB 11.0 (L) 12/06/2018    HCT 33.4 (L) 12/06/2018    MCV 95 12/06/2018     (L) 12/06/2018    Lab Results   Component Value Date    BNP 39 11/27/2018     BNP (pg/mL)   Date Value   11/27/2018 39   10/05/2017 143   07/08/2014 140      Maura Kaiser CNP  [facility] Heart [facility]   Heart Failure Clinic    This note has been dictated using voice recognition software. Any grammatical, typographical, or context distortions are unintentional and inherent to the software

## 2021-06-27 NOTE — PROGRESS NOTES
Progress Notes by Rony Verma MD at 4/24/2019  4:10 PM     Author: Rony Verma MD Service: -- Author Type: Physician    Filed: 4/25/2019  8:05 AM Encounter Date: 4/24/2019 Status: Signed    : Rony Verma MD (Physician)           Click to link to [facility] Heart [facility] HEART [facility] NOTE    Thank you, Dr. Gill, for asking us to see Ani ALEKS Castillo at the [facility] Heart [facility].      Assessment/Recommendations   Patient with known multiple medical problems including permanent atrial fibrillation, diastolic heart failure, mild coronary artery disease and hypertension.  Her blood pressure is well treated currently.  She does have extra fluid on board both with pulmonary vascular congestion, mildly positive hepatojugular reflux and potentially some slight crackles at the bases although they clear with coughing.  She is not on any diuretic at this time.  She did get electrolyte disturbances noted with furosemide.    We talked about adding a very small dose of Spironolactone and I have recommended starting with 12.5 mg a day.  Because of her electrolyte disturbances we will have her come back to the clinic later next week and get a basic metabolic panel and be evaluated by our heart failure nurse practitioner.  I suspect she has 5 to 10 pounds of extra fluid on board at this time but if we are aggressive at getting the fluid out, she will likely develop quite significant electrolyte disturbances so we will go very slowly and see if she can tolerate spironolactone.  If she is tolerating it I would suggest that we should increase it to the standard starting dose of 25 mg a day at that time.    We also talked today about the possibility of a watchman device to protect her from cardioembolic phenomenon.  She would need to be on anticoagulation for at least 30 days prior to the device and about 60 days after  the device she is not at all interested in anticoagulation given the  severity of her GI bleed will continue on a baby aspirin each day.    Thank you for allowing us to participate in her care.         History of Present Illness    Ms. Ani Castillo is a 86 y.o. female with known history of hypertension, mild coronary artery disease, permanent atrial fibrillation, type 2 diabetes, ulcerative colitis with rectal bleeding with significant GI bleeding and therefore is not on anticoagulation.  She is also had difficulty with furosemide and electrolyte disturbances with hyponatremia hypomagnesemia.    She is been reasonably stable of late.  She has a fairly sedentary life and uses a walker to get around with assistance.  She does get short of breath with physical activity and this is been stable over the last couple of months.  She has chronic diastolic heart failure.  She denies orthopnea, paroxysmal nocturnal dyspnea and is not getting any palpitation is not had any syncopal or near syncopal episodes.    On Easter weekend she had a discomfort in her left side.  It was in her left abdomen sort of in the mid axillary line and then it radiated up into her left chest in the mid axillary line.  It was somewhat sharp in nature and with breathing it seemed to get worse.  She felt like she might of been a little bit more short of breath with it but was not diaphoretic or nauseated.  She spoke with her daughter who is a registered nurse in our clinic and she was advised to go to the emergency department but she declined.  She took some extra prednisone and anti-inflammatory agent and the discomfort went away in about an hour.  It did last most of one day.  It is not returned.    She has also noticed some more market peripheral edema and she is picked up a fair amount of weight.  She tries to eat a low-sodium diet but she gets a meal delivered to her each day and does not have control over the sodium in this meal.    She is not on any diuretics.  Most recent electrolytes are noted below.          Physical Examination Review of Systems   Vitals:    04/24/19 1622   BP: 128/82   Pulse: 68   Resp: 16     Body mass index is 41.52 kg/m .  Wt Readings from Last 3 Encounters:   04/24/19 (!) 227 lb (103 kg)   03/13/19 193 lb (87.5 kg)   01/28/19 193 lb (87.5 kg)     General Appearance:   Alert, cooperative and in no acute distress.   ENT/Mouth: Oral mucuos membranes pink and moist .      EYES:  No scleral icterus. No Xanthelasma.    Neck: JVP normal.  Mildly positive hepatojugular reflux. Thyroid not visualizedly   Chest/Lungs:   Lungs have scant crackles at the bases which clear with cough, equal chest wall expansion    Cardiovascular:   S1, S2 with 1/6 systolic murmur , no clicks or rubs. Brachial, radial  pulses are intact and symetric. No carotid bruits noted   Abdomen:  Nontender. BS+..      Extremities: No cyanosis, clubbing and bilateral pretibial edema edema   Skin: no xanthelasma, warm.    Psych: Appropriate affect.   Neurologic:  normal  bilateral, no tremors        General: Weight Gain  Eyes: WNL  Ears/Nose/Throat: WNL  Lungs: WNL  Heart: Chest Pain, Leg Swelling  Stomach: WNL  Bladder: WNL  Muscle/Joints: WNL  Skin: WNL  Nervous System: WNL  Mental Health: WNL     Blood: WNL     Medical History  Surgical History Family History Social History   Past Medical History:   Diagnosis Date   ? Atrial fibrillation (H)    ? Benign essential hypertension     Created by Conversion    ? Cardiomyopathy (H)    ? CHF (congestive heart failure) (H)    ? Chronic kidney disease     ckd   ? Chronic Kidney Disease (NKF Classification)     Created by Conversion    ? Colitis    ? Diabetes mellitus (H)    ? Diastolic Congestive Heart Failure     Created by Conversion  Replacement Utility updated for latest IMO load   ? DM (diabetes mellitus) (H)     Created by Conversion    ? GI bleed 10/18/2016   ? Gout    ? Hyperlipidemia    ? Hyperlipidemia     Created by Conversion    ? Hypertension    ? Morbid obesity (H)    ? MALISSA  (obstructive sleep apnea)    ? Persistent atrial fibrillation (H)      [id number] score of 6 and on chronic warfarin Cardioversion 11/10,  7/11asymptomatic and on rate control     ? PRB (rectal bleeding)    ? Shortness of breath     Created by Conversion    ? Sleep apnea     uses a CPAP   ? SOB (shortness of breath)    ? Urinary incontinence     Past Surgical History:   Procedure Laterality Date   ? BACK SURGERY     ? CARDIAC CATHETERIZATION  03/11/2016   ? ILEOSTOMY N/A 10/6/2018    Procedure: CREATION, ILEOSTOMY, OPEN;  Surgeon: Buzz Arvizu MD;  Location: [facility];  Service:    ? MT APPENDECTOMY      Description: Appendectomy;  Recorded: 06/26/2013;   ? MT ARTHROPLASTY TIBIAL PLATEAU      Description: Knee Replacement;  Recorded: 06/26/2013; 3 surgery   ? MT PART REMOVAL COLON W ANASTOMOSIS N/A 10/6/2018    Procedure: TOTAL ABDOMINAL COLECTOMY, OMENTECTOMY;  Surgeon: Buzz Arvizu MD;  Location: [facility];  Service: General   ? MT SIGMOIDOSCOPY FLX DX W/COLLJ SPEC BR/WA IF PFRMD N/A 5/29/2018    Procedure: SIGMOIDOSCOPY with biopsy;  Surgeon: Simone Conner MD;  Location: [facility];  Service: Gastroenterology   ? SIGMOIDOSCOPY N/A 10/18/2016    Procedure: SIGMOIDOSCOPY with biopsy;  Surgeon: Angeles Bianchi MD;  Location: [facility];  Service:     Family History   Problem Relation Age of Onset   ? No Medical Problems Mother    ? No Medical Problems Father    ? Breast cancer Sister    ? Colon cancer Sister    ? No Medical Problems Daughter    ? No Medical Problems Maternal Grandmother    ? No Medical Problems Maternal Grandfather    ? No Medical Problems Paternal Grandmother    ? No Medical Problems Paternal Grandfather    ? No Medical Problems Maternal Aunt    ? No Medical Problems Paternal Aunt    ? BRCA 1/2 Neg Hx    ? Cancer Neg Hx    ? Endometrial cancer Neg Hx    ? Ovarian cancer Neg Hx     Social History     Socioeconomic History   ? Marital status:       Spouse name: Not on file   ? Number of children: Not on file   ? Years of education: Not on file   ? Highest education level: Not on file   Occupational History   ? Not on file   Social Needs   ? Financial resource strain: Not on file   ? Food insecurity:     Worry: Not on file     Inability: Not on file   ? Transportation needs:     Medical: Not on file     Non-medical: Not on file   Tobacco Use   ? Smoking status: Never Smoker   ? Smokeless tobacco: Never Used   Substance and Sexual Activity   ? Alcohol use: No   ? Drug use: No   ? Sexual activity: Not on file   Lifestyle   ? Physical activity:     Days per week: Not on file     Minutes per session: Not on file   ? Stress: Not on file   Relationships   ? Social connections:     Talks on phone: Not on file     Gets together: Not on file     Attends Oriental orthodox service: Not on file     Active member of club or organization: Not on file     Attends meetings of clubs or organizations: Not on file     Relationship status: Not on file   ? Intimate partner violence:     Fear of current or ex partner: Not on file     Emotionally abused: Not on file     Physically abused: Not on file     Forced sexual activity: Not on file   Other Topics Concern   ? Not on file   Social History Narrative    Lives with her .          Medications  Allergies   Current Outpatient Medications   Medication Sig Dispense Refill   ? aspirin 81 mg chewable tablet Chew 81 mg daily.     ? blood sugar diagnostic (GLUCOSE BLOOD) Strp Use As Directed. OneTouch Ultra Blue In Vitro Strip.     ? escitalopram oxalate (LEXAPRO) 10 MG tablet Take 10 mg by mouth daily.     ? ferrous sulfate 325 (65 FE) MG tablet Take 1 tablet by mouth daily with breakfast.     ? LANTUS SOLOSTAR U-100 INSULIN 100 unit/mL (3 mL) pen Inject 30 Units under the skin daily with breakfast. (Patient taking differently: Inject 28 Units under the skin daily with breakfast.       ) 5 adj dose pen PRN   ? magnesium oxide (MAG-OX) 400  mg (241.3 mg magnesium) tablet Take 400 mg by mouth daily.     ? predniSONE (DELTASONE) 2.5 MG tablet 7.5 mg/d prednisone PO for 1 month, reduce to 5 mg/d for the next month, and then reduce to 2.5 mg/d for the third month and then stop.. (Patient taking differently: 7.5 mg/d prednisone PO Daily.) 90 tablet 2   ? simvastatin (ZOCOR) 20 MG tablet Take 20 mg by mouth every morning .           ? acetaminophen (ACETAMINOPHEN EXTRA STRENGTH) 500 MG tablet Take 1,000 mg by mouth 2 (two) times a day as needed.      ? insulin lispro (HUMALOG KWIKPEN) 100 unit/mL pen Inject 4 Units under the skin 3 (three) times a day with meals Hold dose if you don't eat. 5 adj dose pen prn   ? spironolactone (ALDACTONE) 25 MG tablet Take 0.5 tablets (12.5 mg total) by mouth daily. 30 tablet 5     No current facility-administered medications for this visit.       Allergies   Allergen Reactions   ? Metformin Other (See Comments)     GI symptoms         Lab Results    Chemistry/lipid CBC Cardiac Enzymes/BNP/TSH/INR   Lab Results   Component Value Date    CHOL 140 06/12/2017    HDL 48 (L) 06/12/2017    LDLCALC 47 06/12/2017    TRIG 224 (H) 06/12/2017    CREATININE 1.02 03/29/2019    BUN 35 (H) 03/29/2019    K 4.0 03/29/2019     03/29/2019     (H) 03/29/2019    CO2 20 (L) 03/29/2019    Lab Results   Component Value Date    WBC 9.2 12/06/2018    HGB 11.0 (L) 12/06/2018    HCT 33.4 (L) 12/06/2018    MCV 95 12/06/2018     (L) 12/06/2018    Lab Results   Component Value Date    CKMB 4 08/20/2012    TROPONINI 0.05 12/02/2018    BNP 39 11/27/2018    TSH 2.56 08/03/2018    INR 1.10 10/05/2018

## 2021-06-28 NOTE — PROGRESS NOTES
Progress Notes by Rony Verma MD at 3/12/2020  9:50 AM     Author: Rony Verma MD Service: -- Author Type: Physician    Filed: 3/12/2020  5:20 PM Encounter Date: 3/12/2020 Status: Signed    : Rony Verma MD (Physician)               Assessment/Recommendations   Patient with known coronary artery disease, diastolic heart failure, permanent atrial fibrillation, hypertension, type 2 diabetes, inflammatory bowel disease and polyarthritis with a history of GI bleeding.    She is stable although her functional capacity is limited.  She did improve with low-dose Spironolactone and her peripheral edema is better although she is not convinced that her shortness of breath with activity is better.  She does not have evidence of pulmonary vascular congestion today.    I would like to check a basic metabolic panel and on the diuretic a B natruretic peptide.    I have not made any changes in her current medical regimen.  We will get back to her with the results of the blood work and any further recommendations and plan on seeing her back in 6 months.    Thank you for allowing us to participate in her care       History of Present Illness/Subjective    Ms. Ani Castillo is a 86 y.o. female with known type 2 diabetes, permanent atrial fibrillation, coronary artery disease, diastolic heart failure, and inflammatory bowel disease with a history of GI bleeding which contraindicates the use of anticoagulants.    She has been stable.  She does not move around a lot although is doing some physical therapy now to try to help her balance.  She is not had any falls and she is petrified about the possibility of fall.  She has not had chest discomfort.  Her shortness of breath with activity stable and she denies orthopnea or paroxysmal nocturnal dyspnea while her peripheral edema is better on the spironolactone.           Physical Examination Review of Systems   Vitals:    03/12/20 1004   BP: 118/64   Pulse: (!) 58    Resp: 14     Body mass index is 42.07 kg/m .  Wt Readings from Last 3 Encounters:   03/12/20 (!) 230 lb (104.3 kg)   01/30/20 (!) 227 lb (103 kg)   10/30/19 (!) 227 lb (103 kg)     General Appearance:   Alert, cooperative and in no acute distress.   ENT/Mouth: Oral mucuos membranes pink and moist .      EYES:  no scleral icterus, normal conjunctivae   Neck: JVP normal. No Hepatojugular reflux. Thyroid not visualized.   Chest/Lungs:   Lungs are clear to auscultation, equal chest wall expansion.   Cardiovascular:   S1, S2 without murmur ,clicks or rubs. , radial  pulses are intact and symetric. No carotid bruits noted   Abdomen:  Nontender. BS+.    Extremities: No cyanosis, clubbing or edema   Skin: no xanthelasma, warm.    Neurologic: normal  bilateral, no tremors     Psychiatric: Appropriate affect.      General: WNL  Eyes: WNL  Ears/Nose/Throat: WNL  Lungs: Shortness of Breath  Heart: Shortness of Breath with activity, Irregular Heartbeat, Leg Swelling  Stomach: WNL  Bladder: WNL  Muscle/Joints: Muscle Weakness, Muscle Pain  Skin: WNL  Nervous System: Loss of Balance  Mental Health: WNL     Blood: Easy Bruising       Medical History  Surgical History Family History Social History   Past Medical History:   Diagnosis Date   ? Abnormal CT of the head 12/3/2018   ? Atrial fibrillation (H)    ? Benign essential hypertension     Created by Conversion    ? Cardiomyopathy (H)    ? CHF (congestive heart failure) (H)    ? Chronic kidney disease     ckd   ? Chronic Kidney Disease (NKF Classification)     Created by Conversion    ? Colitis    ? Diabetes mellitus (H)    ? Diastolic Congestive Heart Failure     Created by Conversion  Replacement Utility updated for latest IMO load   ? DM (diabetes mellitus) (H)     Created by Conversion    ? GI bleed 10/18/2016   ? Gout    ? Hyperlipidemia    ? Hyperlipidemia     Created by Conversion    ? Hypertension    ? Morbid obesity (H)    ? MALISSA (obstructive sleep apnea)    ?  Persistent atrial fibrillation      [id number] score of 6 and on chronic warfarin Cardioversion 11/10,  7/11asymptomatic and on rate control     ? PRB (rectal bleeding)    ? Shortness of breath     Created by Conversion    ? Sleep apnea     uses a CPAP   ? SOB (shortness of breath)    ? Urinary incontinence    ? Urinary tract infection 12/1/2018    Past Surgical History:   Procedure Laterality Date   ? BACK SURGERY     ? CARDIAC CATHETERIZATION  03/11/2016   ? ILEOSTOMY N/A 10/6/2018    Procedure: CREATION, ILEOSTOMY, OPEN;  Surgeon: Buzz Arvizu MD;  [place] [facility] [place]:    ? [place] APPENDECTOMY      Description: Appendectomy;  Recorded: 06/26/2013;   ? WA ARTHROPLASTY TIBIAL PLATEAU      Description: Knee Replacement;  Recorded: 06/26/2013; 3 surgery   ? WA PART REMOVAL COLON W ANASTOMOSIS N/A 10/6/2018    Procedure: TOTAL ABDOMINAL COLECTOMY, OMENTECTOMY;  Surgeon: Buzz Arvizu MD;  [place] [facility] [place]: General   ? WA SIGMOIDOSCOPY FLX DX W/COLLJ SPEC BR/WA IF PFRMD N/A 5/29/2018    Procedure: SIGMOIDOSCOPY with biopsy;  Surgeon: Simone Conner MD;  Location: [facility];  Service: Gastroenterology   ? SIGMOIDOSCOPY N/A 10/18/2016    Procedure: SIGMOIDOSCOPY with biopsy;  Surgeon: Angeles Bianchi MD;  Location: [facility];  Service:     Family History   Problem Relation Age of Onset   ? No Medical Problems Mother    ? No Medical Problems Father    ? Breast cancer Sister    ? Colon cancer Sister    ? No Medical Problems Daughter    ? No Medical Problems Maternal Grandmother    ? No Medical Problems Maternal Grandfather    ? No Medical Problems Paternal Grandmother    ? No Medical Problems Paternal Grandfather    ? No Medical Problems Maternal Aunt    ? No Medical Problems Paternal Aunt    ? BRCA 1/2 Neg Hx    ? Cancer Neg Hx    ? Endometrial cancer Neg Hx    ? Ovarian cancer Neg Hx     Social History     Socioeconomic History   ? Marital status:       Spouse name: Not on file   ? Number of children: Not on file   ? Years of education: Not on file   ? Highest education level: Not on file   Occupational History   ? Not on file   Social Needs   ? Financial resource strain: Not on file   ? Food insecurity     Worry: Not on file     Inability: Not on file   ? Transportation needs     Medical: Not on file     Non-medical: Not on file   Tobacco Use   ? Smoking status: Never Smoker   ? Smokeless tobacco: Never Used   Substance and Sexual Activity   ? Alcohol use: No   ? Drug use: No   ? Sexual activity: Not on file   Lifestyle   ? Physical activity     Days per week: Not on file     Minutes per session: Not on file   ? Stress: Not on file   Relationships   ? Social connections     Talks on phone: Not on file     Gets together: Not on file     Attends Restoration service: Not on file     Active member of club or organization: Not on file     Attends meetings of clubs or organizations: Not on file     Relationship status: Not on file   ? Intimate partner violence     Fear of current or ex partner: Not on file     Emotionally abused: Not on file     Physically abused: Not on file     Forced sexual activity: Not on file   Other Topics Concern   ? Not on file   Social History Narrative    Lives with her .          Medications  Allergies   Current Outpatient Medications   Medication Sig Dispense Refill   ? acetaminophen (ACETAMINOPHEN EXTRA STRENGTH) 500 MG tablet Take 1,000 mg by mouth 2 (two) times a day as needed.      ? aspirin 81 mg chewable tablet Chew 81 mg daily.     ? blood sugar diagnostic (GLUCOSE BLOOD) Strp Use As Directed. OneTouch Ultra Blue In Vitro Strip.     ? escitalopram oxalate (LEXAPRO) 10 MG tablet Take 10 mg by mouth daily.     ? ferrous sulfate 325 (65 FE) MG tablet Take 1 tablet by mouth daily with breakfast.     ? insulin lispro (HUMALOG KWIKPEN) 100 unit/mL pen Inject 4 Units under the skin 3 (three) times a day with meals Hold dose if you  don't eat. 5 adj dose pen prn   ? LACTOBACILLUS BIFIDUS ORAL Take 1 tablet by mouth daily.     ? LANTUS SOLOSTAR U-100 INSULIN 100 unit/mL (3 mL) pen Inject 30 Units under the skin daily with breakfast. (Patient taking differently: Inject 28 Units under the skin daily with breakfast.       ) 5 adj dose pen PRN   ? magnesium oxide (MAG-OX) 400 mg (241.3 mg magnesium) tablet Take 400 mg by mouth daily.     ? simvastatin (ZOCOR) 20 MG tablet Take 20 mg by mouth every morning .           ? spironolactone (ALDACTONE) 25 MG tablet Take 0.5 tablets (12.5 mg total) by mouth daily. 30 tablet 5   ? sulfaSALAzine (AZULFIDINE) 500 mg tablet Take 2 tablets (1,000 mg total) by mouth 2 (two) times a day. 240 tablet 0     No current facility-administered medications for this visit.     Allergies   Allergen Reactions   ? Metformin Other (See Comments)     GI symptoms         Lab Results    Chemistry/lipid CBC Cardiac Enzymes/BNP/TSH/INR   Lab Results   Component Value Date    CHOL 143 09/10/2019    HDL 62 09/10/2019    LDLCALC 39 09/10/2019    TRIG 211 (H) 09/10/2019    CREATININE 1.21 (H) 01/28/2020    BUN 36 (H) 09/10/2019    K 4.4 09/10/2019     09/10/2019     (H) 09/10/2019    CO2 19 (L) 09/10/2019    Lab Results   Component Value Date    WBC 7.2 01/28/2020    HGB 12.4 01/28/2020    HCT 37.4 01/28/2020    MCV 97 01/28/2020     01/28/2020    Lab Results   Component Value Date    CKMB 4 08/20/2012    TROPONINI 0.05 12/02/2018    BNP 39 11/27/2018    TSH 2.56 08/03/2018    INR 1.10 10/05/2018

## 2021-07-03 NOTE — ADDENDUM NOTE
Addendum Note by Cindy Benson PharmD at 9/13/2018 10:00 AM     Author: Cindy Benson PharmD Service: -- Author Type: Pharmacist    Filed: 9/13/2018 10:00 AM Date of Service: 9/13/2018 10:00 AM Status: Signed    : Cindy Benson PharmD (Pharmacist)    Encounter addended by: Cindy Benson PharmD on: 9/13/2018 10:00 AM<BR>     Actions taken: Chief Complaint modified

## 2021-07-03 NOTE — ADDENDUM NOTE
Addendum Note by Lorena Scott CMA at 3/27/2018  9:05 AM     Author: Lorena Scott CMA Service: -- Author Type: Certified Medical Assistant    Filed: 3/27/2018  9:05 AM Encounter Date: 3/26/2018 Status: Signed    : Lorena Scott CMA (Certified Medical Assistant)    Addended by: OLRENA SCOTT on: 3/27/2018 09:05 AM        Modules accepted: Orders

## 2021-07-13 ENCOUNTER — RECORDS - HEALTHEAST (OUTPATIENT)
Dept: ADMINISTRATIVE | Facility: CLINIC | Age: 86
End: 2021-07-13

## 2021-07-21 ENCOUNTER — RECORDS - HEALTHEAST (OUTPATIENT)
Dept: ADMINISTRATIVE | Facility: CLINIC | Age: 86
End: 2021-07-21

## 2021-07-28 ENCOUNTER — LAB REQUISITION (OUTPATIENT)
Dept: LAB | Facility: CLINIC | Age: 86
End: 2021-07-28

## 2021-07-28 DIAGNOSIS — I13.0 HYPERTENSIVE HEART AND CHRONIC KIDNEY DISEASE WITH HEART FAILURE AND STAGE 1 THROUGH STAGE 4 CHRONIC KIDNEY DISEASE, OR UNSPECIFIED CHRONIC KIDNEY DISEASE (H): ICD-10-CM

## 2021-07-28 LAB
ALBUMIN SERPL-MCNC: 3.7 G/DL (ref 3.5–5)
ALP SERPL-CCNC: 90 U/L (ref 45–120)
ALT SERPL W P-5'-P-CCNC: 16 U/L (ref 0–45)
ANION GAP SERPL CALCULATED.3IONS-SCNC: 13 MMOL/L (ref 5–18)
AST SERPL W P-5'-P-CCNC: 23 U/L (ref 0–40)
BILIRUB SERPL-MCNC: 0.4 MG/DL (ref 0–1)
BUN SERPL-MCNC: 62 MG/DL (ref 8–28)
CALCIUM SERPL-MCNC: 9.6 MG/DL (ref 8.5–10.5)
CHLORIDE BLD-SCNC: 105 MMOL/L (ref 98–107)
CO2 SERPL-SCNC: 20 MMOL/L (ref 22–31)
CREAT SERPL-MCNC: 1.22 MG/DL (ref 0.6–1.1)
GFR SERPL CREATININE-BSD FRML MDRD: 40 ML/MIN/1.73M2
GLUCOSE BLD-MCNC: 156 MG/DL (ref 70–125)
POTASSIUM BLD-SCNC: 5.1 MMOL/L (ref 3.5–5)
PROT SERPL-MCNC: 7 G/DL (ref 6–8)
SODIUM SERPL-SCNC: 138 MMOL/L (ref 136–145)

## 2021-07-28 PROCEDURE — 80053 COMPREHEN METABOLIC PANEL: CPT | Performed by: FAMILY MEDICINE

## 2021-08-03 PROBLEM — K56.609 COLON OBSTRUCTION (H): Status: RESOLVED | Noted: 2018-10-05 | Resolved: 2019-04-25

## 2021-09-08 ENCOUNTER — LAB REQUISITION (OUTPATIENT)
Dept: LAB | Facility: CLINIC | Age: 86
End: 2021-09-08

## 2021-09-08 DIAGNOSIS — I13.0 HYPERTENSIVE HEART AND CHRONIC KIDNEY DISEASE WITH HEART FAILURE AND STAGE 1 THROUGH STAGE 4 CHRONIC KIDNEY DISEASE, OR UNSPECIFIED CHRONIC KIDNEY DISEASE (H): ICD-10-CM

## 2021-09-08 LAB
ALBUMIN SERPL-MCNC: 4.1 G/DL (ref 3.5–5)
ANION GAP SERPL CALCULATED.3IONS-SCNC: 15 MMOL/L (ref 5–18)
BUN SERPL-MCNC: 74 MG/DL (ref 8–28)
CALCIUM SERPL-MCNC: 10.1 MG/DL (ref 8.5–10.5)
CHLORIDE BLD-SCNC: 104 MMOL/L (ref 98–107)
CO2 SERPL-SCNC: 20 MMOL/L (ref 22–31)
CREAT SERPL-MCNC: 1.49 MG/DL (ref 0.6–1.1)
GFR SERPL CREATININE-BSD FRML MDRD: 31 ML/MIN/1.73M2
GLUCOSE BLD-MCNC: 187 MG/DL (ref 70–125)
PHOSPHATE SERPL-MCNC: 4.4 MG/DL (ref 2.5–4.5)
POTASSIUM BLD-SCNC: 5.2 MMOL/L (ref 3.5–5)
SODIUM SERPL-SCNC: 139 MMOL/L (ref 136–145)

## 2021-09-08 PROCEDURE — 88305 TISSUE EXAM BY PATHOLOGIST: CPT | Performed by: PATHOLOGY

## 2021-09-08 PROCEDURE — 80069 RENAL FUNCTION PANEL: CPT | Performed by: FAMILY MEDICINE

## 2021-09-10 ENCOUNTER — LAB REQUISITION (OUTPATIENT)
Dept: LAB | Facility: CLINIC | Age: 86
End: 2021-09-10

## 2021-09-10 DIAGNOSIS — L98.9 DISORDER OF THE SKIN AND SUBCUTANEOUS TISSUE, UNSPECIFIED: ICD-10-CM

## 2021-09-13 LAB
PATH REPORT.COMMENTS IMP SPEC: NORMAL
PATH REPORT.COMMENTS IMP SPEC: NORMAL
PATH REPORT.FINAL DX SPEC: NORMAL
PATH REPORT.GROSS SPEC: NORMAL
PATH REPORT.MICROSCOPIC SPEC OTHER STN: NORMAL
PATH REPORT.RELEVANT HX SPEC: NORMAL
PHOTO IMAGE: NORMAL

## 2021-10-13 ENCOUNTER — LAB REQUISITION (OUTPATIENT)
Dept: LAB | Facility: CLINIC | Age: 86
End: 2021-10-13

## 2021-10-13 DIAGNOSIS — R60.9 EDEMA, UNSPECIFIED: ICD-10-CM

## 2021-10-13 LAB
ALBUMIN SERPL-MCNC: 4 G/DL (ref 3.5–5)
ANION GAP SERPL CALCULATED.3IONS-SCNC: 13 MMOL/L (ref 5–18)
BUN SERPL-MCNC: 59 MG/DL (ref 8–28)
CALCIUM SERPL-MCNC: 9.6 MG/DL (ref 8.5–10.5)
CHLORIDE BLD-SCNC: 104 MMOL/L (ref 98–107)
CO2 SERPL-SCNC: 18 MMOL/L (ref 22–31)
CREAT SERPL-MCNC: 1.2 MG/DL (ref 0.6–1.1)
GFR SERPL CREATININE-BSD FRML MDRD: 40 ML/MIN/1.73M2
GLUCOSE BLD-MCNC: 169 MG/DL (ref 70–125)
PHOSPHATE SERPL-MCNC: 4.1 MG/DL (ref 2.5–4.5)
POTASSIUM BLD-SCNC: 4.7 MMOL/L (ref 3.5–5)
SODIUM SERPL-SCNC: 135 MMOL/L (ref 136–145)

## 2021-10-13 PROCEDURE — 80069 RENAL FUNCTION PANEL: CPT | Performed by: FAMILY MEDICINE

## 2021-11-17 ENCOUNTER — LAB REQUISITION (OUTPATIENT)
Dept: LAB | Facility: CLINIC | Age: 86
End: 2021-11-17

## 2021-11-17 DIAGNOSIS — R60.9 EDEMA, UNSPECIFIED: ICD-10-CM

## 2021-11-17 LAB
ALBUMIN SERPL-MCNC: 4 G/DL (ref 3.5–5)
ANION GAP SERPL CALCULATED.3IONS-SCNC: 18 MMOL/L (ref 5–18)
BUN SERPL-MCNC: 53 MG/DL (ref 8–28)
CALCIUM SERPL-MCNC: 10.1 MG/DL (ref 8.5–10.5)
CHLORIDE BLD-SCNC: 107 MMOL/L (ref 98–107)
CO2 SERPL-SCNC: 14 MMOL/L (ref 22–31)
CREAT SERPL-MCNC: 1.31 MG/DL (ref 0.6–1.1)
GFR SERPL CREATININE-BSD FRML MDRD: 36 ML/MIN/1.73M2
GLUCOSE BLD-MCNC: 164 MG/DL (ref 70–125)
PHOSPHATE SERPL-MCNC: 4 MG/DL (ref 2.5–4.5)
POTASSIUM BLD-SCNC: 4.9 MMOL/L (ref 3.5–5)
SODIUM SERPL-SCNC: 139 MMOL/L (ref 136–145)

## 2021-11-17 PROCEDURE — 80069 RENAL FUNCTION PANEL: CPT | Performed by: FAMILY MEDICINE

## 2022-09-09 ENCOUNTER — LAB REQUISITION (OUTPATIENT)
Dept: LAB | Facility: CLINIC | Age: 87
End: 2022-09-09

## 2022-09-09 DIAGNOSIS — I13.0 HYPERTENSIVE HEART AND CHRONIC KIDNEY DISEASE WITH HEART FAILURE AND STAGE 1 THROUGH STAGE 4 CHRONIC KIDNEY DISEASE, OR UNSPECIFIED CHRONIC KIDNEY DISEASE (H): ICD-10-CM

## 2022-09-09 LAB
ALBUMIN SERPL BCG-MCNC: 4.4 G/DL (ref 3.5–5.2)
ALP SERPL-CCNC: 98 U/L (ref 35–104)
ALT SERPL W P-5'-P-CCNC: 22 U/L (ref 10–35)
ANION GAP SERPL CALCULATED.3IONS-SCNC: 13 MMOL/L (ref 7–15)
AST SERPL W P-5'-P-CCNC: 31 U/L (ref 10–35)
BILIRUB SERPL-MCNC: 0.3 MG/DL
BUN SERPL-MCNC: 60 MG/DL (ref 8–23)
CALCIUM SERPL-MCNC: 9.4 MG/DL (ref 8.8–10.2)
CHLORIDE SERPL-SCNC: 106 MMOL/L (ref 98–107)
CHOLEST SERPL-MCNC: 139 MG/DL
CREAT SERPL-MCNC: 1.07 MG/DL (ref 0.51–0.95)
DEPRECATED HCO3 PLAS-SCNC: 19 MMOL/L (ref 22–29)
GFR SERPL CREATININE-BSD FRML MDRD: 49 ML/MIN/1.73M2
GLUCOSE SERPL-MCNC: 176 MG/DL (ref 70–99)
HDLC SERPL-MCNC: 50 MG/DL
LDLC SERPL CALC-MCNC: 25 MG/DL
NONHDLC SERPL-MCNC: 89 MG/DL
POTASSIUM SERPL-SCNC: 4.6 MMOL/L (ref 3.4–5.3)
PROT SERPL-MCNC: 7 G/DL (ref 6.4–8.3)
SODIUM SERPL-SCNC: 138 MMOL/L (ref 136–145)
TRIGL SERPL-MCNC: 318 MG/DL

## 2022-09-09 PROCEDURE — 80061 LIPID PANEL: CPT | Performed by: FAMILY MEDICINE

## 2022-09-09 PROCEDURE — 80053 COMPREHEN METABOLIC PANEL: CPT | Performed by: FAMILY MEDICINE

## 2023-05-10 ENCOUNTER — LAB REQUISITION (OUTPATIENT)
Dept: LAB | Facility: CLINIC | Age: 88
End: 2023-05-10

## 2023-05-10 DIAGNOSIS — N18.32 CHRONIC KIDNEY DISEASE, STAGE 3B (H): ICD-10-CM

## 2023-05-10 PROCEDURE — 80069 RENAL FUNCTION PANEL: CPT | Performed by: FAMILY MEDICINE

## 2023-05-11 LAB
ALBUMIN SERPL BCG-MCNC: 4.3 G/DL (ref 3.5–5.2)
ANION GAP SERPL CALCULATED.3IONS-SCNC: 16 MMOL/L (ref 7–15)
BUN SERPL-MCNC: 41.7 MG/DL (ref 8–23)
CALCIUM SERPL-MCNC: 9.6 MG/DL (ref 8.2–9.6)
CHLORIDE SERPL-SCNC: 103 MMOL/L (ref 98–107)
CREAT SERPL-MCNC: 1.16 MG/DL (ref 0.51–0.95)
DEPRECATED HCO3 PLAS-SCNC: 19 MMOL/L (ref 22–29)
GFR SERPL CREATININE-BSD FRML MDRD: 45 ML/MIN/1.73M2
GLUCOSE SERPL-MCNC: 153 MG/DL (ref 70–99)
PHOSPHATE SERPL-MCNC: 4.3 MG/DL (ref 2.5–4.5)
POTASSIUM SERPL-SCNC: 4.5 MMOL/L (ref 3.4–5.3)
SODIUM SERPL-SCNC: 138 MMOL/L (ref 136–145)

## 2023-08-31 ENCOUNTER — MEDICAL CORRESPONDENCE (OUTPATIENT)
Dept: HEALTH INFORMATION MANAGEMENT | Facility: CLINIC | Age: 88
End: 2023-08-31
Payer: COMMERCIAL

## 2023-09-01 ENCOUNTER — TELEPHONE (OUTPATIENT)
Dept: WOUND CARE | Facility: HOSPITAL | Age: 88
End: 2023-09-01
Payer: COMMERCIAL

## 2023-09-14 ENCOUNTER — HOSPITAL ENCOUNTER (OUTPATIENT)
Dept: WOUND CARE | Facility: HOSPITAL | Age: 88
Discharge: HOME | End: 2023-09-14
Admitting: COLON & RECTAL SURGERY
Payer: COMMERCIAL

## 2023-09-14 ENCOUNTER — DOCUMENTATION ONLY (OUTPATIENT)
Dept: OTHER | Facility: CLINIC | Age: 88
End: 2023-09-14
Payer: COMMERCIAL

## 2023-09-14 DIAGNOSIS — Z43.2 ENCOUNTER FOR ATTENTION TO ILEOSTOMY (H): Primary | ICD-10-CM

## 2023-09-14 PROCEDURE — G0463 HOSPITAL OUTPT CLINIC VISIT: HCPCS

## 2023-09-14 NOTE — PROGRESS NOTES
"[facility]  OUTPATIENT OSTOMY ASSESSMENT    Type of Stoma: Permanent Ileostomy  Anticipated date of takedown: NA     Diagnosis Pertinent to Stoma:Bowel obstruction       Date of Diagnosis: 10/2018  Type of Surgery: Ileostomy                                         Surgery Date: 10/2018  Surgeon: Nolberto                                                       [facility]: [facility]    Purpose of this visit: Leaking Problem    Pertinent Information: Frequent leaking and irritated skin noted.    Present for Teaching Session: Patient and Family Member   Present: NA      Current Equipment:     Product # Brand Description   Pouch 8285 [address] 1-3/8\" convex   Flange         Paste [id number] Coloplast 1-3/8\" protective seal   Powder [address] [place] Stoma powder   Pouch Change Frequency: 2-5 times weekly  Provider of Care: Emptying: Self Pouch Change: Nurse friend who comes to help    ASSESSMENT  Stoma Size: Round 1 1/4 inches  Protrusion:  3 cm  Stoma Appearance: Red and Moist  Mucocutaneous Juncture: Intact   Peristomal Skin: Erythema and Rash  Abdominal Assessment:  WDL other than peristomal skin    TREATMENT  Applied Stoma Powder and Applied No Sting Skin barrier    INTERVENTIONS  Educated on peristomal skin treatment, Educated on pouch change procedure, and new products recommended to help with skin treatment and health    INSTRUCTIONS GIVEN  WOC Role, Pouching Products: Showed pouching system , and stool thicening strategies and moisture control strategies (see AVS for specifics)    PLAN  Continue same Pouching System and Change in Supplies: See Outpatient Ostomy Instructions      Total Time Spent with Patient: 50 minutes   "

## 2023-09-14 NOTE — DISCHARGE INSTRUCTIONS
"OUTPATIENT OSTOMY INSTRUCTIONS                                                                        Type of Stoma: Ileostomy         Supplier:  Patient's choice      Equipment:  Current Equipment:     Product # Brand Description   Pouch [address]285 [address] 1-3/8\" convex   Paste [id number] Coloplast 1-3/8\" protective seal   Powder [address] [place] Stoma powder         Barrier            [id number]     [place]      Adapt Barrier Extender  extender    Skin barrier      3344      3M             Cavilon No-Sting Skin barrier  Procedure:  Close the bottom of the pouch.  Remove backings from adhesive surfaces.  Remove the soiled pouch and discard.  Wash skin with water and dry.    Then: Apply powder to open areas only, brush off excess powder. - use antifungal powder until fungal rash is resolved. Set in place with no sting skin barrier. Allow to dry for 30 - 60 seconds.  Then: Apply Ring/Paste: Around stoma.               Then: Apply pouching system to stoma site and hold in place for 2-5 minutes.     ______________________________________________________________________________    Pouch Change: Twice weekly (may need to change more frequently until rash heals    Skin care recommendations: During times of high heat/humidity - recommend use of Interdry Ag to abdominal fold. If Interdry Ag is not available, then may use an antifungal powder to clean/dry folds (not so much that it cakes in the fold, a light layer spread across the skin fold area). May place a folded pillow case to help wick moisture as well.    Diet recommendations: Concentrate on foods which will help thicken stools. Avoid coffee in the morning unless you are drinking it with food. Focus on foods which will help thicken stool such as bagels, pasta, peanut butter, marshmallows, bananas. If these do not work well and leaking continues, consider use of Banatrol. Start three times per day, then as stool becomes consistent may decrease to 1 or 2 times daily as long as stool " remains thicker and does not leak.     [facility] Nurse Specialist: Kyra Ngo RN CWOCN  Questions: [facility] [phone] ()      Follow-up Appointment: as needed for leakage. Please call [facility] [phone] () to request an appointment.

## 2023-10-05 ENCOUNTER — TELEPHONE (OUTPATIENT)
Dept: WOUND CARE | Facility: HOSPITAL | Age: 88
End: 2023-10-05
Payer: COMMERCIAL

## 2024-02-06 ENCOUNTER — LAB REQUISITION (OUTPATIENT)
Dept: LAB | Facility: CLINIC | Age: 89
End: 2024-02-06

## 2024-02-06 DIAGNOSIS — E11.22 TYPE 2 DIABETES MELLITUS WITH DIABETIC CHRONIC KIDNEY DISEASE (H): ICD-10-CM

## 2024-02-06 PROCEDURE — 80053 COMPREHEN METABOLIC PANEL: CPT | Performed by: FAMILY MEDICINE

## 2024-02-07 LAB
ALBUMIN SERPL BCG-MCNC: 4.4 G/DL (ref 3.5–5.2)
ALP SERPL-CCNC: 100 U/L (ref 40–150)
ALT SERPL W P-5'-P-CCNC: 23 U/L (ref 0–50)
ANION GAP SERPL CALCULATED.3IONS-SCNC: 14 MMOL/L (ref 7–15)
AST SERPL W P-5'-P-CCNC: 32 U/L (ref 0–45)
BILIRUB SERPL-MCNC: 0.4 MG/DL
BUN SERPL-MCNC: 73.3 MG/DL (ref 8–23)
CALCIUM SERPL-MCNC: 9.9 MG/DL (ref 8.2–9.6)
CHLORIDE SERPL-SCNC: 102 MMOL/L (ref 98–107)
CREAT SERPL-MCNC: 1.31 MG/DL (ref 0.51–0.95)
DEPRECATED HCO3 PLAS-SCNC: 19 MMOL/L (ref 22–29)
EGFRCR SERPLBLD CKD-EPI 2021: 39 ML/MIN/1.73M2
GLUCOSE SERPL-MCNC: 210 MG/DL (ref 70–99)
POTASSIUM SERPL-SCNC: 5 MMOL/L (ref 3.4–5.3)
PROT SERPL-MCNC: 7.3 G/DL (ref 6.4–8.3)
SODIUM SERPL-SCNC: 135 MMOL/L (ref 135–145)

## 2025-01-28 ENCOUNTER — LAB REQUISITION (OUTPATIENT)
Dept: LAB | Facility: HOSPITAL | Age: OVER 89
End: 2025-01-28
Payer: COMMERCIAL

## 2025-01-28 DIAGNOSIS — N17.9 ACUTE KIDNEY FAILURE, UNSPECIFIED: ICD-10-CM

## 2025-01-28 LAB
ALBUMIN UR-MCNC: 30 MG/DL
ANION GAP SERPL CALCULATED.3IONS-SCNC: 22 MMOL/L (ref 7–15)
APPEARANCE UR: ABNORMAL
BACTERIA #/AREA URNS HPF: ABNORMAL /HPF
BILIRUB UR QL STRIP: NEGATIVE
BUN SERPL-MCNC: 162.9 MG/DL (ref 8–23)
CALCIUM SERPL-MCNC: 9.7 MG/DL (ref 8.8–10.4)
CHLORIDE SERPL-SCNC: 94 MMOL/L (ref 98–107)
COLOR UR AUTO: YELLOW
CREAT SERPL-MCNC: 1.73 MG/DL (ref 0.51–0.95)
EGFRCR SERPLBLD CKD-EPI 2021: 27 ML/MIN/1.73M2
ERYTHROCYTE [DISTWIDTH] IN BLOOD BY AUTOMATED COUNT: 12.1 % (ref 10–15)
GLUCOSE SERPL-MCNC: 278 MG/DL (ref 70–99)
GLUCOSE UR STRIP-MCNC: NEGATIVE MG/DL
HCO3 SERPL-SCNC: 13 MMOL/L (ref 22–29)
HCT VFR BLD AUTO: 31.4 % (ref 35–47)
HGB BLD-MCNC: 10.4 G/DL (ref 11.7–15.7)
HGB UR QL STRIP: ABNORMAL
KETONES UR STRIP-MCNC: NEGATIVE MG/DL
LEUKOCYTE ESTERASE UR QL STRIP: ABNORMAL
MCH RBC QN AUTO: 32.8 PG (ref 26.5–33)
MCHC RBC AUTO-ENTMCNC: 33.1 G/DL (ref 31.5–36.5)
MCV RBC AUTO: 99 FL (ref 78–100)
NITRATE UR QL: NEGATIVE
PH UR STRIP: 5.5 [PH] (ref 5–7)
PLATELET # BLD AUTO: 172 10E3/UL (ref 150–450)
POTASSIUM SERPL-SCNC: 5.2 MMOL/L (ref 3.4–5.3)
RBC # BLD AUTO: 3.17 10E6/UL (ref 3.8–5.2)
RBC URINE: 1 /HPF
SODIUM SERPL-SCNC: 129 MMOL/L (ref 135–145)
SP GR UR STRIP: 1.02 (ref 1–1.03)
UROBILINOGEN UR STRIP-MCNC: <2 MG/DL
WBC # BLD AUTO: 10 10E3/UL (ref 4–11)
WBC CLUMPS #/AREA URNS HPF: PRESENT /HPF
WBC URINE: >182 /HPF

## 2025-01-29 LAB
BACTERIA UR CULT: ABNORMAL